# Patient Record
Sex: MALE | Race: WHITE | Employment: OTHER | URBAN - METROPOLITAN AREA
[De-identification: names, ages, dates, MRNs, and addresses within clinical notes are randomized per-mention and may not be internally consistent; named-entity substitution may affect disease eponyms.]

---

## 2019-01-01 ENCOUNTER — HOSPITAL ENCOUNTER (OUTPATIENT)
Dept: CARDIAC REHAB | Facility: CLINIC | Age: 59
End: 2019-10-23
Attending: PHYSICIAN ASSISTANT
Payer: COMMERCIAL

## 2019-01-01 ENCOUNTER — OFFICE VISIT (OUTPATIENT)
Dept: NEUROLOGY | Facility: CLINIC | Age: 59
End: 2019-01-01
Payer: COMMERCIAL

## 2019-01-01 ENCOUNTER — CARE COORDINATION (OUTPATIENT)
Dept: CARDIOLOGY | Facility: CLINIC | Age: 59
End: 2019-01-01

## 2019-01-01 ENCOUNTER — HOSPITAL ENCOUNTER (OUTPATIENT)
Dept: CARDIAC REHAB | Facility: CLINIC | Age: 59
End: 2019-10-04
Attending: PHYSICIAN ASSISTANT
Payer: COMMERCIAL

## 2019-01-01 ENCOUNTER — HOSPITAL ENCOUNTER (INPATIENT)
Facility: CLINIC | Age: 59
LOS: 5 days | Discharge: HOME HEALTH | DRG: 854 | End: 2019-07-22
Attending: EMERGENCY MEDICINE | Admitting: INTERNAL MEDICINE
Payer: COMMERCIAL

## 2019-01-01 ENCOUNTER — OFFICE VISIT (OUTPATIENT)
Dept: FAMILY MEDICINE | Facility: CLINIC | Age: 59
End: 2019-01-01
Payer: COMMERCIAL

## 2019-01-01 ENCOUNTER — OFFICE VISIT (OUTPATIENT)
Dept: CARDIOLOGY | Facility: CLINIC | Age: 59
End: 2019-01-01
Attending: PHYSICIAN ASSISTANT
Payer: COMMERCIAL

## 2019-01-01 ENCOUNTER — TELEPHONE (OUTPATIENT)
Dept: FAMILY MEDICINE | Facility: CLINIC | Age: 59
End: 2019-01-01

## 2019-01-01 ENCOUNTER — HOSPITAL ENCOUNTER (OUTPATIENT)
Dept: PHYSICAL THERAPY | Facility: CLINIC | Age: 59
Setting detail: THERAPIES SERIES
End: 2019-10-31
Attending: PSYCHIATRY & NEUROLOGY
Payer: COMMERCIAL

## 2019-01-01 ENCOUNTER — OFFICE VISIT (OUTPATIENT)
Dept: URGENT CARE | Facility: URGENT CARE | Age: 59
End: 2019-01-01
Payer: COMMERCIAL

## 2019-01-01 ENCOUNTER — APPOINTMENT (OUTPATIENT)
Dept: OCCUPATIONAL THERAPY | Facility: CLINIC | Age: 59
DRG: 292 | End: 2019-01-01
Payer: COMMERCIAL

## 2019-01-01 ENCOUNTER — HOSPITAL ENCOUNTER (OUTPATIENT)
Dept: PHYSICAL THERAPY | Facility: CLINIC | Age: 59
Setting detail: THERAPIES SERIES
End: 2019-10-11
Attending: PSYCHIATRY & NEUROLOGY
Payer: COMMERCIAL

## 2019-01-01 ENCOUNTER — PATIENT OUTREACH (OUTPATIENT)
Dept: CARE COORDINATION | Facility: CLINIC | Age: 59
End: 2019-01-01

## 2019-01-01 ENCOUNTER — DOCUMENTATION ONLY (OUTPATIENT)
Dept: NEUROLOGY | Facility: CLINIC | Age: 59
End: 2019-01-01

## 2019-01-01 ENCOUNTER — HOSPITAL ENCOUNTER (OUTPATIENT)
Dept: PHYSICAL THERAPY | Facility: CLINIC | Age: 59
Setting detail: THERAPIES SERIES
End: 2019-11-14
Attending: PSYCHIATRY & NEUROLOGY
Payer: COMMERCIAL

## 2019-01-01 ENCOUNTER — OFFICE VISIT (OUTPATIENT)
Dept: NEUROLOGY | Facility: CLINIC | Age: 59
End: 2019-01-01
Attending: PSYCHIATRY & NEUROLOGY
Payer: COMMERCIAL

## 2019-01-01 ENCOUNTER — HOSPITAL ENCOUNTER (OUTPATIENT)
Dept: CARDIOLOGY | Facility: CLINIC | Age: 59
Discharge: HOME | End: 2019-09-09
Attending: PHYSICIAN ASSISTANT | Admitting: PHYSICIAN ASSISTANT
Payer: COMMERCIAL

## 2019-01-01 ENCOUNTER — NURSE TRIAGE (OUTPATIENT)
Dept: NURSING | Facility: CLINIC | Age: 59
End: 2019-01-01

## 2019-01-01 ENCOUNTER — HOSPITAL ENCOUNTER (OUTPATIENT)
Dept: CARDIAC REHAB | Facility: CLINIC | Age: 59
End: 2019-10-18
Attending: PHYSICIAN ASSISTANT
Payer: COMMERCIAL

## 2019-01-01 ENCOUNTER — MYC MEDICAL ADVICE (OUTPATIENT)
Dept: FAMILY MEDICINE | Facility: CLINIC | Age: 59
End: 2019-01-01

## 2019-01-01 ENCOUNTER — TRANSFERRED RECORDS (OUTPATIENT)
Dept: HEALTH INFORMATION MANAGEMENT | Facility: CLINIC | Age: 59
End: 2019-01-01

## 2019-01-01 ENCOUNTER — APPOINTMENT (OUTPATIENT)
Dept: ULTRASOUND IMAGING | Facility: CLINIC | Age: 59
DRG: 854 | End: 2019-01-01
Attending: INTERNAL MEDICINE
Payer: COMMERCIAL

## 2019-01-01 ENCOUNTER — HOSPITAL ENCOUNTER (OUTPATIENT)
Dept: CARDIAC REHAB | Facility: CLINIC | Age: 59
End: 2019-09-30
Attending: PHYSICIAN ASSISTANT
Payer: COMMERCIAL

## 2019-01-01 ENCOUNTER — HOSPITAL ENCOUNTER (OUTPATIENT)
Dept: PHYSICAL THERAPY | Facility: CLINIC | Age: 59
Setting detail: THERAPIES SERIES
End: 2019-12-12
Attending: PSYCHIATRY & NEUROLOGY
Payer: COMMERCIAL

## 2019-01-01 ENCOUNTER — APPOINTMENT (OUTPATIENT)
Dept: NUCLEAR MEDICINE | Facility: CLINIC | Age: 59
DRG: 292 | End: 2019-01-01
Attending: INTERNAL MEDICINE
Payer: COMMERCIAL

## 2019-01-01 ENCOUNTER — HOSPITAL ENCOUNTER (OUTPATIENT)
Dept: CARDIAC REHAB | Facility: CLINIC | Age: 59
End: 2019-11-01
Attending: PHYSICIAN ASSISTANT
Payer: COMMERCIAL

## 2019-01-01 ENCOUNTER — HOSPITAL ENCOUNTER (OUTPATIENT)
Dept: CARDIAC REHAB | Facility: CLINIC | Age: 59
End: 2019-11-13
Attending: PHYSICIAN ASSISTANT
Payer: COMMERCIAL

## 2019-01-01 ENCOUNTER — APPOINTMENT (OUTPATIENT)
Dept: OCCUPATIONAL THERAPY | Facility: CLINIC | Age: 59
DRG: 292 | End: 2019-01-01
Attending: HOSPITALIST
Payer: COMMERCIAL

## 2019-01-01 ENCOUNTER — HOSPITAL ENCOUNTER (OUTPATIENT)
Dept: PHYSICAL THERAPY | Facility: CLINIC | Age: 59
Setting detail: THERAPIES SERIES
End: 2019-11-21
Attending: PSYCHIATRY & NEUROLOGY
Payer: COMMERCIAL

## 2019-01-01 ENCOUNTER — ANCILLARY PROCEDURE (OUTPATIENT)
Dept: GENERAL RADIOLOGY | Facility: CLINIC | Age: 59
End: 2019-01-01
Attending: PHYSICIAN ASSISTANT
Payer: COMMERCIAL

## 2019-01-01 ENCOUNTER — HOSPITAL ENCOUNTER (OUTPATIENT)
Dept: CARDIAC REHAB | Facility: CLINIC | Age: 59
End: 2019-10-07
Attending: PHYSICIAN ASSISTANT
Payer: COMMERCIAL

## 2019-01-01 ENCOUNTER — HOSPITAL ENCOUNTER (OUTPATIENT)
Dept: CARDIAC REHAB | Facility: CLINIC | Age: 59
End: 2019-10-09
Attending: PHYSICIAN ASSISTANT
Payer: COMMERCIAL

## 2019-01-01 ENCOUNTER — HOSPITAL ENCOUNTER (OUTPATIENT)
Dept: PHYSICAL THERAPY | Facility: CLINIC | Age: 59
Setting detail: THERAPIES SERIES
End: 2019-11-07
Attending: PSYCHIATRY & NEUROLOGY
Payer: COMMERCIAL

## 2019-01-01 ENCOUNTER — OFFICE VISIT (OUTPATIENT)
Dept: SURGERY | Facility: CLINIC | Age: 59
End: 2019-01-01
Payer: COMMERCIAL

## 2019-01-01 ENCOUNTER — HOSPITAL ENCOUNTER (OUTPATIENT)
Dept: PHYSICAL THERAPY | Facility: CLINIC | Age: 59
Setting detail: THERAPIES SERIES
End: 2019-12-26
Attending: PSYCHIATRY & NEUROLOGY
Payer: COMMERCIAL

## 2019-01-01 ENCOUNTER — CARE COORDINATION (OUTPATIENT)
Dept: LAB | Facility: CLINIC | Age: 59
End: 2019-01-01

## 2019-01-01 ENCOUNTER — HOSPITAL ENCOUNTER (OUTPATIENT)
Dept: CARDIAC REHAB | Facility: CLINIC | Age: 59
End: 2019-10-21
Attending: PHYSICIAN ASSISTANT
Payer: COMMERCIAL

## 2019-01-01 ENCOUNTER — HOSPITAL ENCOUNTER (OUTPATIENT)
Dept: CARDIAC REHAB | Facility: CLINIC | Age: 59
End: 2019-10-28
Attending: PHYSICIAN ASSISTANT
Payer: COMMERCIAL

## 2019-01-01 ENCOUNTER — TELEPHONE (OUTPATIENT)
Dept: CARDIOLOGY | Facility: CLINIC | Age: 59
End: 2019-01-01

## 2019-01-01 ENCOUNTER — OFFICE VISIT (OUTPATIENT)
Dept: PODIATRY | Facility: CLINIC | Age: 59
End: 2019-01-01
Attending: PHYSICIAN ASSISTANT
Payer: COMMERCIAL

## 2019-01-01 ENCOUNTER — APPOINTMENT (OUTPATIENT)
Dept: CARDIOLOGY | Facility: CLINIC | Age: 59
DRG: 292 | End: 2019-01-01
Attending: HOSPITALIST
Payer: COMMERCIAL

## 2019-01-01 ENCOUNTER — HEALTH MAINTENANCE LETTER (OUTPATIENT)
Age: 59
End: 2019-01-01

## 2019-01-01 ENCOUNTER — HOSPITAL ENCOUNTER (OUTPATIENT)
Dept: CARDIAC REHAB | Facility: CLINIC | Age: 59
End: 2019-10-02
Attending: PHYSICIAN ASSISTANT
Payer: COMMERCIAL

## 2019-01-01 ENCOUNTER — HOSPITAL ENCOUNTER (OUTPATIENT)
Dept: CARDIAC REHAB | Facility: CLINIC | Age: 59
End: 2019-11-19
Attending: PHYSICIAN ASSISTANT
Payer: COMMERCIAL

## 2019-01-01 ENCOUNTER — APPOINTMENT (OUTPATIENT)
Dept: GENERAL RADIOLOGY | Facility: CLINIC | Age: 59
DRG: 292 | End: 2019-01-01
Attending: PHYSICIAN ASSISTANT
Payer: COMMERCIAL

## 2019-01-01 ENCOUNTER — HOSPITAL ENCOUNTER (OUTPATIENT)
Dept: CARDIAC REHAB | Facility: CLINIC | Age: 59
End: 2019-11-11
Attending: PHYSICIAN ASSISTANT
Payer: COMMERCIAL

## 2019-01-01 ENCOUNTER — HOSPITAL ENCOUNTER (OUTPATIENT)
Dept: CARDIAC REHAB | Facility: CLINIC | Age: 59
End: 2019-10-30
Attending: PHYSICIAN ASSISTANT
Payer: COMMERCIAL

## 2019-01-01 ENCOUNTER — HOSPITAL ENCOUNTER (INPATIENT)
Facility: CLINIC | Age: 59
LOS: 5 days | Discharge: HOME | DRG: 292 | End: 2019-08-24
Attending: EMERGENCY MEDICINE | Admitting: HOSPITALIST
Payer: COMMERCIAL

## 2019-01-01 ENCOUNTER — HOSPITAL ENCOUNTER (OUTPATIENT)
Dept: CARDIAC REHAB | Facility: CLINIC | Age: 59
End: 2019-10-16
Attending: PHYSICIAN ASSISTANT
Payer: COMMERCIAL

## 2019-01-01 ENCOUNTER — HOSPITAL ENCOUNTER (OUTPATIENT)
Dept: CARDIAC REHAB | Facility: CLINIC | Age: 59
End: 2019-11-15
Attending: PHYSICIAN ASSISTANT
Payer: COMMERCIAL

## 2019-01-01 ENCOUNTER — APPOINTMENT (OUTPATIENT)
Dept: CT IMAGING | Facility: CLINIC | Age: 59
DRG: 292 | End: 2019-01-01
Attending: EMERGENCY MEDICINE
Payer: COMMERCIAL

## 2019-01-01 ENCOUNTER — HOSPITAL ENCOUNTER (OUTPATIENT)
Dept: CARDIAC REHAB | Facility: CLINIC | Age: 59
End: 2019-10-25
Attending: PHYSICIAN ASSISTANT
Payer: COMMERCIAL

## 2019-01-01 ENCOUNTER — HOSPITAL ENCOUNTER (OUTPATIENT)
Dept: PHYSICAL THERAPY | Facility: CLINIC | Age: 59
Setting detail: THERAPIES SERIES
End: 2019-12-19
Attending: PSYCHIATRY & NEUROLOGY
Payer: COMMERCIAL

## 2019-01-01 ENCOUNTER — HOSPITAL ENCOUNTER (OUTPATIENT)
Dept: CARDIAC REHAB | Facility: CLINIC | Age: 59
End: 2019-09-26
Attending: PHYSICIAN ASSISTANT
Payer: COMMERCIAL

## 2019-01-01 ENCOUNTER — HOSPITAL ENCOUNTER (OUTPATIENT)
Dept: PHYSICAL THERAPY | Facility: CLINIC | Age: 59
Setting detail: THERAPIES SERIES
End: 2019-12-05
Attending: PSYCHIATRY & NEUROLOGY
Payer: COMMERCIAL

## 2019-01-01 ENCOUNTER — HOSPITAL ENCOUNTER (OUTPATIENT)
Dept: CARDIAC REHAB | Facility: CLINIC | Age: 59
End: 2019-11-08
Attending: PHYSICIAN ASSISTANT
Payer: COMMERCIAL

## 2019-01-01 ENCOUNTER — HOSPITAL ENCOUNTER (OUTPATIENT)
Dept: CARDIAC REHAB | Facility: CLINIC | Age: 59
End: 2019-10-11
Attending: PHYSICIAN ASSISTANT
Payer: COMMERCIAL

## 2019-01-01 VITALS
DIASTOLIC BLOOD PRESSURE: 78 MMHG | HEIGHT: 71 IN | SYSTOLIC BLOOD PRESSURE: 126 MMHG | HEART RATE: 85 BPM | OXYGEN SATURATION: 98 % | BODY MASS INDEX: 28.14 KG/M2 | WEIGHT: 201 LBS | TEMPERATURE: 98.6 F

## 2019-01-01 VITALS
RESPIRATION RATE: 16 BRPM | HEART RATE: 84 BPM | OXYGEN SATURATION: 98 % | BODY MASS INDEX: 27.62 KG/M2 | WEIGHT: 197.3 LBS | HEIGHT: 71 IN | SYSTOLIC BLOOD PRESSURE: 136 MMHG | DIASTOLIC BLOOD PRESSURE: 90 MMHG | TEMPERATURE: 97.8 F

## 2019-01-01 VITALS
DIASTOLIC BLOOD PRESSURE: 72 MMHG | HEART RATE: 72 BPM | HEIGHT: 71 IN | SYSTOLIC BLOOD PRESSURE: 112 MMHG | OXYGEN SATURATION: 96 % | BODY MASS INDEX: 27.41 KG/M2 | WEIGHT: 195.8 LBS

## 2019-01-01 VITALS
HEART RATE: 125 BPM | TEMPERATURE: 100.2 F | SYSTOLIC BLOOD PRESSURE: 176 MMHG | DIASTOLIC BLOOD PRESSURE: 112 MMHG | OXYGEN SATURATION: 96 %

## 2019-01-01 VITALS
BODY MASS INDEX: 28.42 KG/M2 | HEIGHT: 71 IN | WEIGHT: 203 LBS | DIASTOLIC BLOOD PRESSURE: 108 MMHG | SYSTOLIC BLOOD PRESSURE: 150 MMHG

## 2019-01-01 VITALS
TEMPERATURE: 97.9 F | WEIGHT: 197.8 LBS | DIASTOLIC BLOOD PRESSURE: 86 MMHG | HEART RATE: 80 BPM | SYSTOLIC BLOOD PRESSURE: 134 MMHG | BODY MASS INDEX: 27.69 KG/M2 | HEIGHT: 71 IN

## 2019-01-01 VITALS
BODY MASS INDEX: 28.22 KG/M2 | HEIGHT: 71 IN | DIASTOLIC BLOOD PRESSURE: 102 MMHG | HEART RATE: 82 BPM | SYSTOLIC BLOOD PRESSURE: 160 MMHG | WEIGHT: 201.6 LBS | TEMPERATURE: 98.4 F

## 2019-01-01 VITALS
HEART RATE: 79 BPM | DIASTOLIC BLOOD PRESSURE: 80 MMHG | BODY MASS INDEX: 28.1 KG/M2 | OXYGEN SATURATION: 98 % | TEMPERATURE: 98.1 F | WEIGHT: 201.5 LBS | SYSTOLIC BLOOD PRESSURE: 122 MMHG

## 2019-01-01 VITALS
HEART RATE: 76 BPM | BODY MASS INDEX: 27.44 KG/M2 | OXYGEN SATURATION: 96 % | HEIGHT: 71 IN | DIASTOLIC BLOOD PRESSURE: 82 MMHG | WEIGHT: 196 LBS | SYSTOLIC BLOOD PRESSURE: 138 MMHG

## 2019-01-01 VITALS
WEIGHT: 206.6 LBS | SYSTOLIC BLOOD PRESSURE: 128 MMHG | HEIGHT: 71 IN | DIASTOLIC BLOOD PRESSURE: 82 MMHG | BODY MASS INDEX: 28.92 KG/M2 | HEART RATE: 81 BPM

## 2019-01-01 VITALS
HEART RATE: 88 BPM | BODY MASS INDEX: 28.87 KG/M2 | TEMPERATURE: 98 F | SYSTOLIC BLOOD PRESSURE: 150 MMHG | DIASTOLIC BLOOD PRESSURE: 94 MMHG | HEIGHT: 71 IN | WEIGHT: 206.2 LBS

## 2019-01-01 VITALS
BODY MASS INDEX: 28.22 KG/M2 | WEIGHT: 201.6 LBS | TEMPERATURE: 99 F | HEIGHT: 71 IN | SYSTOLIC BLOOD PRESSURE: 154 MMHG | HEART RATE: 100 BPM | DIASTOLIC BLOOD PRESSURE: 90 MMHG

## 2019-01-01 VITALS
DIASTOLIC BLOOD PRESSURE: 108 MMHG | OXYGEN SATURATION: 94 % | TEMPERATURE: 99.4 F | HEART RATE: 104 BPM | HEIGHT: 71 IN | WEIGHT: 205 LBS | BODY MASS INDEX: 28.7 KG/M2 | SYSTOLIC BLOOD PRESSURE: 162 MMHG

## 2019-01-01 VITALS — SYSTOLIC BLOOD PRESSURE: 128 MMHG | DIASTOLIC BLOOD PRESSURE: 77 MMHG | OXYGEN SATURATION: 95 %

## 2019-01-01 VITALS
RESPIRATION RATE: 16 BRPM | HEART RATE: 90 BPM | DIASTOLIC BLOOD PRESSURE: 84 MMHG | BODY MASS INDEX: 28.39 KG/M2 | SYSTOLIC BLOOD PRESSURE: 143 MMHG | TEMPERATURE: 97.2 F | HEIGHT: 71 IN | WEIGHT: 202.8 LBS | OXYGEN SATURATION: 96 %

## 2019-01-01 VITALS
HEIGHT: 71 IN | WEIGHT: 189.8 LBS | HEART RATE: 90 BPM | TEMPERATURE: 97.8 F | SYSTOLIC BLOOD PRESSURE: 180 MMHG | DIASTOLIC BLOOD PRESSURE: 104 MMHG | BODY MASS INDEX: 26.57 KG/M2

## 2019-01-01 VITALS
HEART RATE: 87 BPM | OXYGEN SATURATION: 98 % | BODY MASS INDEX: 28.31 KG/M2 | WEIGHT: 203 LBS | DIASTOLIC BLOOD PRESSURE: 78 MMHG | TEMPERATURE: 98.3 F | SYSTOLIC BLOOD PRESSURE: 131 MMHG

## 2019-01-01 DIAGNOSIS — I10 HYPERTENSION GOAL BP (BLOOD PRESSURE) < 140/90: ICD-10-CM

## 2019-01-01 DIAGNOSIS — I10 ESSENTIAL HYPERTENSION: ICD-10-CM

## 2019-01-01 DIAGNOSIS — L02.212 BACK ABSCESS: ICD-10-CM

## 2019-01-01 DIAGNOSIS — E11.9 TYPE 2 DIABETES, HBA1C GOAL < 7% (H): Chronic | ICD-10-CM

## 2019-01-01 DIAGNOSIS — M62.59 ATROPHY OF MUSCLE OF MULTIPLE SITES: ICD-10-CM

## 2019-01-01 DIAGNOSIS — I50.33 ACUTE ON CHRONIC DIASTOLIC CONGESTIVE HEART FAILURE (H): Primary | ICD-10-CM

## 2019-01-01 DIAGNOSIS — E11.9 TYPE 2 DIABETES MELLITUS WITHOUT COMPLICATION, WITH LONG-TERM CURRENT USE OF INSULIN (H): ICD-10-CM

## 2019-01-01 DIAGNOSIS — I10 HYPERTENSION, UNSPECIFIED TYPE: ICD-10-CM

## 2019-01-01 DIAGNOSIS — G63 POLYNEUROPATHY ASSOCIATED WITH UNDERLYING DISEASE (H): ICD-10-CM

## 2019-01-01 DIAGNOSIS — Z79.4 TYPE 2 DIABETES MELLITUS WITHOUT COMPLICATION, WITH LONG-TERM CURRENT USE OF INSULIN (H): ICD-10-CM

## 2019-01-01 DIAGNOSIS — L03.319 CELLULITIS AND ABSCESS OF TRUNK: Primary | ICD-10-CM

## 2019-01-01 DIAGNOSIS — I24.89 DEMAND ISCHEMIA (H): ICD-10-CM

## 2019-01-01 DIAGNOSIS — L03.90 CELLULITIS, UNSPECIFIED CELLULITIS SITE: ICD-10-CM

## 2019-01-01 DIAGNOSIS — I50.43 ACUTE ON CHRONIC COMBINED SYSTOLIC AND DIASTOLIC CONGESTIVE HEART FAILURE (H): ICD-10-CM

## 2019-01-01 DIAGNOSIS — I50.9 CONGESTIVE HEART FAILURE, UNSPECIFIED HF CHRONICITY, UNSPECIFIED HEART FAILURE TYPE (H): ICD-10-CM

## 2019-01-01 DIAGNOSIS — E11.59 TYPE 2 DIABETES MELLITUS WITH OTHER CIRCULATORY COMPLICATION, UNSPECIFIED WHETHER LONG TERM INSULIN USE (H): Primary | ICD-10-CM

## 2019-01-01 DIAGNOSIS — R73.9 HYPERGLYCEMIA: ICD-10-CM

## 2019-01-01 DIAGNOSIS — L02.219 CELLULITIS AND ABSCESS OF TRUNK: Primary | ICD-10-CM

## 2019-01-01 DIAGNOSIS — R65.10 SIRS (SYSTEMIC INFLAMMATORY RESPONSE SYNDROME) (H): ICD-10-CM

## 2019-01-01 DIAGNOSIS — G56.23 ULNAR NEUROPATHY OF BOTH UPPER EXTREMITIES: ICD-10-CM

## 2019-01-01 DIAGNOSIS — R53.83 OTHER FATIGUE: ICD-10-CM

## 2019-01-01 DIAGNOSIS — F41.9 ANXIETY DUE TO INVASIVE PROCEDURE: ICD-10-CM

## 2019-01-01 DIAGNOSIS — I10 HTN (HYPERTENSION): Primary | ICD-10-CM

## 2019-01-01 DIAGNOSIS — G62.9 NEUROPATHY: ICD-10-CM

## 2019-01-01 DIAGNOSIS — R79.89 ELEVATED TROPONIN: ICD-10-CM

## 2019-01-01 DIAGNOSIS — M21.371 BILATERAL FOOT-DROP: ICD-10-CM

## 2019-01-01 DIAGNOSIS — N28.9 DECREASED RENAL FUNCTION: ICD-10-CM

## 2019-01-01 DIAGNOSIS — I50.9 ACUTE ON CHRONIC CONGESTIVE HEART FAILURE, UNSPECIFIED HEART FAILURE TYPE (H): ICD-10-CM

## 2019-01-01 DIAGNOSIS — R06.02 SHORTNESS OF BREATH: ICD-10-CM

## 2019-01-01 DIAGNOSIS — L02.212 BACK ABSCESS: Primary | ICD-10-CM

## 2019-01-01 DIAGNOSIS — E78.5 HYPERLIPIDEMIA WITH TARGET LDL LESS THAN 100: Chronic | ICD-10-CM

## 2019-01-01 DIAGNOSIS — E11.59 TYPE 2 DIABETES MELLITUS WITH OTHER CIRCULATORY COMPLICATION, UNSPECIFIED WHETHER LONG TERM INSULIN USE (H): Primary | Chronic | ICD-10-CM

## 2019-01-01 DIAGNOSIS — Z13.89 SCREENING FOR DIABETIC PERIPHERAL NEUROPATHY: ICD-10-CM

## 2019-01-01 DIAGNOSIS — M54.12 CERVICAL RADICULOPATHY: ICD-10-CM

## 2019-01-01 DIAGNOSIS — E11.59 TYPE 2 DIABETES MELLITUS WITH OTHER CIRCULATORY COMPLICATION, UNSPECIFIED WHETHER LONG TERM INSULIN USE (H): ICD-10-CM

## 2019-01-01 DIAGNOSIS — I50.9 CONGESTIVE HEART FAILURE, UNSPECIFIED HF CHRONICITY, UNSPECIFIED HEART FAILURE TYPE (H): Primary | ICD-10-CM

## 2019-01-01 DIAGNOSIS — E11.42 DIABETIC POLYNEUROPATHY ASSOCIATED WITH TYPE 2 DIABETES MELLITUS (H): Primary | ICD-10-CM

## 2019-01-01 DIAGNOSIS — I10 HYPERTENSION GOAL BP (BLOOD PRESSURE) < 140/90: Primary | ICD-10-CM

## 2019-01-01 DIAGNOSIS — Z12.11 SCREEN FOR COLON CANCER: ICD-10-CM

## 2019-01-01 DIAGNOSIS — M54.16 LUMBAR RADICULOPATHY: ICD-10-CM

## 2019-01-01 DIAGNOSIS — Z09 SURGERY FOLLOW-UP EXAMINATION: Primary | ICD-10-CM

## 2019-01-01 DIAGNOSIS — I50.31 ACUTE HEART FAILURE WITH PRESERVED EJECTION FRACTION (H): ICD-10-CM

## 2019-01-01 DIAGNOSIS — Z79.4 TYPE 2 DIABETES MELLITUS WITH COMPLICATION, WITH LONG-TERM CURRENT USE OF INSULIN (H): ICD-10-CM

## 2019-01-01 DIAGNOSIS — Z11.59 NEED FOR HEPATITIS C SCREENING TEST: ICD-10-CM

## 2019-01-01 DIAGNOSIS — R25.3 FASCICULATIONS OF MUSCLE: ICD-10-CM

## 2019-01-01 DIAGNOSIS — Z11.4 SCREENING FOR HIV (HUMAN IMMUNODEFICIENCY VIRUS): ICD-10-CM

## 2019-01-01 DIAGNOSIS — R05.9 COUGH: ICD-10-CM

## 2019-01-01 DIAGNOSIS — R53.1 WEAK: ICD-10-CM

## 2019-01-01 DIAGNOSIS — N17.9 ACUTE KIDNEY INJURY (H): ICD-10-CM

## 2019-01-01 DIAGNOSIS — I50.31 ACUTE HEART FAILURE WITH PRESERVED EJECTION FRACTION (H): Primary | ICD-10-CM

## 2019-01-01 DIAGNOSIS — M25.571 PAIN IN JOINT, ANKLE AND FOOT, RIGHT: ICD-10-CM

## 2019-01-01 DIAGNOSIS — M21.372 BILATERAL FOOT-DROP: ICD-10-CM

## 2019-01-01 DIAGNOSIS — E11.42 DIABETIC POLYNEUROPATHY ASSOCIATED WITH TYPE 2 DIABETES MELLITUS (H): ICD-10-CM

## 2019-01-01 DIAGNOSIS — K08.89 PAIN, DENTAL: ICD-10-CM

## 2019-01-01 DIAGNOSIS — G56.03 BILATERAL CARPAL TUNNEL SYNDROME: ICD-10-CM

## 2019-01-01 DIAGNOSIS — E11.8 TYPE 2 DIABETES MELLITUS WITH COMPLICATION, WITH LONG-TERM CURRENT USE OF INSULIN (H): ICD-10-CM

## 2019-01-01 DIAGNOSIS — E11.9 TYPE 2 DIABETES MELLITUS WITHOUT COMPLICATION, UNSPECIFIED WHETHER LONG TERM INSULIN USE (H): Primary | ICD-10-CM

## 2019-01-01 DIAGNOSIS — M21.371 BILATERAL FOOT-DROP: Primary | ICD-10-CM

## 2019-01-01 DIAGNOSIS — L02.219 CELLULITIS AND ABSCESS OF TRUNK: ICD-10-CM

## 2019-01-01 DIAGNOSIS — R29.818 DIFFICULTY BALANCING: ICD-10-CM

## 2019-01-01 DIAGNOSIS — M25.471 EDEMA OF RIGHT ANKLE: Primary | ICD-10-CM

## 2019-01-01 DIAGNOSIS — R06.02 SHORTNESS OF BREATH: Primary | ICD-10-CM

## 2019-01-01 DIAGNOSIS — I50.43 ACUTE ON CHRONIC COMBINED SYSTOLIC AND DIASTOLIC CONGESTIVE HEART FAILURE (H): Primary | ICD-10-CM

## 2019-01-01 DIAGNOSIS — M21.372 BILATERAL FOOT-DROP: Primary | ICD-10-CM

## 2019-01-01 DIAGNOSIS — I50.9 CHF EXACERBATION (H): Primary | ICD-10-CM

## 2019-01-01 DIAGNOSIS — G56.03 BILATERAL CARPAL TUNNEL SYNDROME: Primary | ICD-10-CM

## 2019-01-01 DIAGNOSIS — L03.319 CELLULITIS AND ABSCESS OF TRUNK: ICD-10-CM

## 2019-01-01 DIAGNOSIS — Z13.5 SCREENING FOR DIABETIC RETINOPATHY: ICD-10-CM

## 2019-01-01 DIAGNOSIS — E11.9 TYPE 2 DIABETES MELLITUS WITHOUT COMPLICATION, UNSPECIFIED WHETHER LONG TERM INSULIN USE (H): Primary | Chronic | ICD-10-CM

## 2019-01-01 DIAGNOSIS — E11.9 TYPE 2 DIABETES MELLITUS WITHOUT COMPLICATION, UNSPECIFIED WHETHER LONG TERM INSULIN USE (H): Chronic | ICD-10-CM

## 2019-01-01 LAB
A-TOCOPHEROL VIT E SERPL-MCNC: 14.1 MG/L (ref 5.5–18)
ALBUMIN MFR UR ELPH: 71.6 %
ALBUMIN SERPL ELPH-MCNC: 3.8 G/DL (ref 3.7–5.1)
ALBUMIN SERPL-MCNC: 1.9 G/DL (ref 3.4–5)
ALBUMIN SERPL-MCNC: 2 G/DL (ref 3.4–5)
ALBUMIN SERPL-MCNC: 2.1 G/DL (ref 3.4–5)
ALBUMIN SERPL-MCNC: 2.8 G/DL (ref 3.4–5)
ALBUMIN UR-MCNC: 10 MG/DL
ALP SERPL-CCNC: 109 U/L (ref 40–150)
ALP SERPL-CCNC: 67 U/L (ref 40–150)
ALPHA1 GLOB MFR UR ELPH: 8.9 %
ALPHA1 GLOB SERPL ELPH-MCNC: 0.3 G/DL (ref 0.2–0.4)
ALPHA2 GLOB MFR UR ELPH: 7.4 %
ALPHA2 GLOB SERPL ELPH-MCNC: 0.7 G/DL (ref 0.5–0.9)
ALT SERPL W P-5'-P-CCNC: 14 U/L (ref 0–70)
ALT SERPL W P-5'-P-CCNC: 19 U/L (ref 0–70)
AMORPH CRY #/AREA URNS HPF: ABNORMAL /HPF
ANA SER QL IF: NEGATIVE
ANION GAP SERPL CALCULATED.3IONS-SCNC: 10.3 MMOL/L (ref 6–17)
ANION GAP SERPL CALCULATED.3IONS-SCNC: 10.7 MMOL/L (ref 6–17)
ANION GAP SERPL CALCULATED.3IONS-SCNC: 3 MMOL/L (ref 3–14)
ANION GAP SERPL CALCULATED.3IONS-SCNC: 3 MMOL/L (ref 3–14)
ANION GAP SERPL CALCULATED.3IONS-SCNC: 4 MMOL/L (ref 3–14)
ANION GAP SERPL CALCULATED.3IONS-SCNC: 5 MMOL/L (ref 3–14)
ANION GAP SERPL CALCULATED.3IONS-SCNC: 5 MMOL/L (ref 3–14)
ANION GAP SERPL CALCULATED.3IONS-SCNC: 6 MMOL/L (ref 3–14)
ANION GAP SERPL CALCULATED.3IONS-SCNC: 7 MMOL/L (ref 3–14)
ANION GAP SERPL CALCULATED.3IONS-SCNC: 8 MMOL/L (ref 3–14)
ANION GAP SERPL CALCULATED.3IONS-SCNC: 8 MMOL/L (ref 3–14)
ANION GAP SERPL CALCULATED.3IONS-SCNC: 9 MMOL/L (ref 3–14)
APPEARANCE UR: CLEAR
AST SERPL W P-5'-P-CCNC: 10 U/L (ref 0–45)
AST SERPL W P-5'-P-CCNC: 15 U/L (ref 0–45)
B BURGDOR IGG+IGM SER QL: 0.47 (ref 0–0.89)
B-GLOBULIN MFR UR ELPH: 3.5 %
B-GLOBULIN SERPL ELPH-MCNC: 0.9 G/DL (ref 0.6–1)
BACTERIA SPEC CULT: ABNORMAL
BACTERIA SPEC CULT: NO GROWTH
BACTERIA SPEC CULT: NO GROWTH
BASOPHILS # BLD AUTO: 0.1 10E9/L (ref 0–0.2)
BASOPHILS # BLD AUTO: 0.2 10E9/L (ref 0–0.2)
BASOPHILS # BLD AUTO: 0.2 10E9/L (ref 0–0.2)
BASOPHILS NFR BLD AUTO: 0.3 %
BASOPHILS NFR BLD AUTO: 0.5 %
BASOPHILS NFR BLD AUTO: 1.1 %
BASOPHILS NFR BLD AUTO: 1.6 %
BASOPHILS NFR BLD AUTO: 1.8 %
BETA+GAMMA TOCOPHEROL SERPL-MCNC: 1.1 MG/L (ref 0–6)
BILIRUB SERPL-MCNC: 0.5 MG/DL (ref 0.2–1.3)
BILIRUB SERPL-MCNC: 0.5 MG/DL (ref 0.2–1.3)
BILIRUB UR QL STRIP: NEGATIVE
BUN SERPL-MCNC: 20 MG/DL (ref 7–30)
BUN SERPL-MCNC: 21 MG/DL (ref 7–30)
BUN SERPL-MCNC: 21 MG/DL (ref 7–30)
BUN SERPL-MCNC: 26 MG/DL (ref 7–30)
BUN SERPL-MCNC: 27 MG/DL (ref 7–30)
BUN SERPL-MCNC: 28 MG/DL (ref 7–30)
BUN SERPL-MCNC: 28 MG/DL (ref 7–30)
BUN SERPL-MCNC: 29 MG/DL (ref 7–30)
BUN SERPL-MCNC: 30 MG/DL (ref 7–30)
BUN SERPL-MCNC: 31 MG/DL (ref 7–30)
BUN SERPL-MCNC: 35 MG/DL (ref 7–30)
BUN SERPL-MCNC: 37 MG/DL (ref 7–30)
BUN SERPL-MCNC: 39 MG/DL (ref 7–30)
BUN SERPL-MCNC: 44 MG/DL (ref 7–30)
BUN SERPL-MCNC: 45 MG/DL (ref 7–30)
BUN SERPL-MCNC: 48 MG/DL (ref 7–30)
BUN SERPL-MCNC: 49 MG/DL (ref 7–30)
CALCIUM SERPL-MCNC: 8.1 MG/DL (ref 8.5–10.1)
CALCIUM SERPL-MCNC: 8.2 MG/DL (ref 8.5–10.1)
CALCIUM SERPL-MCNC: 8.4 MG/DL (ref 8.5–10.1)
CALCIUM SERPL-MCNC: 8.4 MG/DL (ref 8.5–10.1)
CALCIUM SERPL-MCNC: 8.5 MG/DL (ref 8.5–10.1)
CALCIUM SERPL-MCNC: 8.6 MG/DL (ref 8.5–10.1)
CALCIUM SERPL-MCNC: 8.7 MG/DL (ref 8.5–10.1)
CALCIUM SERPL-MCNC: 8.8 MG/DL (ref 8.5–10.1)
CALCIUM SERPL-MCNC: 9.3 MG/DL (ref 8.5–10.5)
CALCIUM SERPL-MCNC: 9.4 MG/DL (ref 8.5–10.1)
CALCIUM SERPL-MCNC: 9.4 MG/DL (ref 8.5–10.5)
CHLORIDE SERPL-SCNC: 102 MMOL/L (ref 94–109)
CHLORIDE SERPL-SCNC: 102 MMOL/L (ref 98–107)
CHLORIDE SERPL-SCNC: 103 MMOL/L (ref 94–109)
CHLORIDE SERPL-SCNC: 103 MMOL/L (ref 94–109)
CHLORIDE SERPL-SCNC: 104 MMOL/L (ref 94–109)
CHLORIDE SERPL-SCNC: 104 MMOL/L (ref 94–109)
CHLORIDE SERPL-SCNC: 104 MMOL/L (ref 98–107)
CHLORIDE SERPL-SCNC: 107 MMOL/L (ref 94–109)
CHLORIDE SERPL-SCNC: 108 MMOL/L (ref 94–109)
CHLORIDE SERPL-SCNC: 109 MMOL/L (ref 94–109)
CHLORIDE SERPL-SCNC: 109 MMOL/L (ref 94–109)
CHLORIDE SERPL-SCNC: 110 MMOL/L (ref 94–109)
CHLORIDE SERPL-SCNC: 111 MMOL/L (ref 94–109)
CHLORIDE SERPL-SCNC: 99 MMOL/L (ref 94–109)
CHOLEST SERPL-MCNC: 218 MG/DL
CHOLEST SERPL-MCNC: 285 MG/DL
CO2 BLDCOV-SCNC: 25 MMOL/L (ref 21–28)
CO2 BLDCOV-SCNC: 30 MMOL/L (ref 21–28)
CO2 SERPL-SCNC: 25 MMOL/L (ref 20–32)
CO2 SERPL-SCNC: 25 MMOL/L (ref 20–32)
CO2 SERPL-SCNC: 26 MMOL/L (ref 20–32)
CO2 SERPL-SCNC: 27 MMOL/L (ref 20–32)
CO2 SERPL-SCNC: 28 MMOL/L (ref 20–32)
CO2 SERPL-SCNC: 29 MMOL/L (ref 20–32)
CO2 SERPL-SCNC: 30 MMOL/L (ref 20–32)
CO2 SERPL-SCNC: 30 MMOL/L (ref 23–29)
CO2 SERPL-SCNC: 31 MMOL/L (ref 20–32)
CO2 SERPL-SCNC: 31 MMOL/L (ref 20–32)
CO2 SERPL-SCNC: 32 MMOL/L (ref 23–29)
COLOR UR AUTO: YELLOW
CREAT BLD-MCNC: 1.3 MG/DL (ref 0.66–1.25)
CREAT SERPL-MCNC: 0.76 MG/DL (ref 0.66–1.25)
CREAT SERPL-MCNC: 0.99 MG/DL (ref 0.66–1.25)
CREAT SERPL-MCNC: 1.07 MG/DL (ref 0.66–1.25)
CREAT SERPL-MCNC: 1.08 MG/DL (ref 0.66–1.25)
CREAT SERPL-MCNC: 1.08 MG/DL (ref 0.66–1.25)
CREAT SERPL-MCNC: 1.1 MG/DL (ref 0.66–1.25)
CREAT SERPL-MCNC: 1.14 MG/DL (ref 0.66–1.25)
CREAT SERPL-MCNC: 1.16 MG/DL (ref 0.66–1.25)
CREAT SERPL-MCNC: 1.17 MG/DL (ref 0.66–1.25)
CREAT SERPL-MCNC: 1.27 MG/DL (ref 0.66–1.25)
CREAT SERPL-MCNC: 1.32 MG/DL (ref 0.66–1.25)
CREAT SERPL-MCNC: 1.41 MG/DL (ref 0.7–1.3)
CREAT SERPL-MCNC: 1.42 MG/DL (ref 0.7–1.3)
CREAT SERPL-MCNC: 1.5 MG/DL (ref 0.66–1.25)
CREAT SERPL-MCNC: 1.76 MG/DL (ref 0.66–1.25)
CREAT SERPL-MCNC: 1.87 MG/DL (ref 0.66–1.25)
CREAT SERPL-MCNC: 2.17 MG/DL (ref 0.66–1.25)
CREAT SERPL-MCNC: 2.34 MG/DL (ref 0.66–1.25)
CREAT SERPL-MCNC: 2.41 MG/DL (ref 0.66–1.25)
CREAT SERPL-MCNC: 2.54 MG/DL (ref 0.66–1.25)
CREAT UR-MCNC: 48 MG/DL
CREAT UR-MCNC: 87 MG/DL
D DIMER PPP FEU-MCNC: 0.8 UG/ML FEU (ref 0–0.5)
DIFFERENTIAL METHOD BLD: ABNORMAL
DIFFERENTIAL METHOD BLD: NORMAL
DIFFERENTIAL METHOD BLD: NORMAL
ENA SS-A IGG SER IA-ACNC: <0.2 AI (ref 0–0.9)
ENA SS-B IGG SER IA-ACNC: <0.2 AI (ref 0–0.9)
EOSINOPHIL # BLD AUTO: 0.3 10E9/L (ref 0–0.7)
EOSINOPHIL # BLD AUTO: 0.3 10E9/L (ref 0–0.7)
EOSINOPHIL # BLD AUTO: 0.4 10E9/L (ref 0–0.7)
EOSINOPHIL # BLD AUTO: 0.5 10E9/L (ref 0–0.7)
EOSINOPHIL # BLD AUTO: 0.5 10E9/L (ref 0–0.7)
EOSINOPHIL NFR BLD AUTO: 1.2 %
EOSINOPHIL NFR BLD AUTO: 2.8 %
EOSINOPHIL NFR BLD AUTO: 2.9 %
EOSINOPHIL NFR BLD AUTO: 3.5 %
EOSINOPHIL NFR BLD AUTO: 4.8 %
ERYTHROCYTE [DISTWIDTH] IN BLOOD BY AUTOMATED COUNT: 12.7 % (ref 10–15)
ERYTHROCYTE [DISTWIDTH] IN BLOOD BY AUTOMATED COUNT: 12.9 % (ref 10–15)
ERYTHROCYTE [DISTWIDTH] IN BLOOD BY AUTOMATED COUNT: 13 % (ref 10–15)
ERYTHROCYTE [DISTWIDTH] IN BLOOD BY AUTOMATED COUNT: 13.1 % (ref 10–15)
ERYTHROCYTE [DISTWIDTH] IN BLOOD BY AUTOMATED COUNT: 14.4 % (ref 10–15)
ERYTHROCYTE [DISTWIDTH] IN BLOOD BY AUTOMATED COUNT: 14.9 % (ref 10–15)
ERYTHROCYTE [DISTWIDTH] IN BLOOD BY AUTOMATED COUNT: 14.9 % (ref 10–15)
ERYTHROCYTE [DISTWIDTH] IN BLOOD BY AUTOMATED COUNT: 15 % (ref 10–15)
ERYTHROCYTE [DISTWIDTH] IN BLOOD BY AUTOMATED COUNT: 15.1 % (ref 10–15)
ERYTHROCYTE [DISTWIDTH] IN BLOOD BY AUTOMATED COUNT: 15.6 % (ref 10–15)
ERYTHROCYTE [SEDIMENTATION RATE] IN BLOOD BY WESTERGREN METHOD: 11 MM/H (ref 0–20)
ERYTHROCYTE [SEDIMENTATION RATE] IN BLOOD BY WESTERGREN METHOD: 18 MM/H (ref 0–20)
ERYTHROCYTE [SEDIMENTATION RATE] IN BLOOD BY WESTERGREN METHOD: 51 MM/H (ref 0–20)
FRACT EXCRET NA UR+SERPL-RTO: 0.5 %
GAMMA GLOB MFR UR ELPH: 8.6 %
GAMMA GLOB SERPL ELPH-MCNC: 1 G/DL (ref 0.7–1.6)
GFR SERPL CREATININE-BSD FRML MDRD: 27 ML/MIN/{1.73_M2}
GFR SERPL CREATININE-BSD FRML MDRD: 28 ML/MIN/{1.73_M2}
GFR SERPL CREATININE-BSD FRML MDRD: 29 ML/MIN/{1.73_M2}
GFR SERPL CREATININE-BSD FRML MDRD: 32 ML/MIN/{1.73_M2}
GFR SERPL CREATININE-BSD FRML MDRD: 39 ML/MIN/{1.73_M2}
GFR SERPL CREATININE-BSD FRML MDRD: 42 ML/MIN/{1.73_M2}
GFR SERPL CREATININE-BSD FRML MDRD: 50 ML/MIN/{1.73_M2}
GFR SERPL CREATININE-BSD FRML MDRD: 51 ML/MIN/{1.73_M2}
GFR SERPL CREATININE-BSD FRML MDRD: 52 ML/MIN/{1.73_M2}
GFR SERPL CREATININE-BSD FRML MDRD: 57 ML/MIN/{1.73_M2}
GFR SERPL CREATININE-BSD FRML MDRD: 59 ML/MIN/{1.73_M2}
GFR SERPL CREATININE-BSD FRML MDRD: 62 ML/MIN/{1.73_M2}
GFR SERPL CREATININE-BSD FRML MDRD: 68 ML/MIN/{1.73_M2}
GFR SERPL CREATININE-BSD FRML MDRD: 69 ML/MIN/{1.73_M2}
GFR SERPL CREATININE-BSD FRML MDRD: 70 ML/MIN/{1.73_M2}
GFR SERPL CREATININE-BSD FRML MDRD: 73 ML/MIN/{1.73_M2}
GFR SERPL CREATININE-BSD FRML MDRD: 75 ML/MIN/{1.73_M2}
GFR SERPL CREATININE-BSD FRML MDRD: 75 ML/MIN/{1.73_M2}
GFR SERPL CREATININE-BSD FRML MDRD: 76 ML/MIN/{1.73_M2}
GFR SERPL CREATININE-BSD FRML MDRD: 83 ML/MIN/{1.73_M2}
GFR SERPL CREATININE-BSD FRML MDRD: >90 ML/MIN/{1.73_M2}
GLUCOSE BLDC GLUCOMTR-MCNC: 100 MG/DL (ref 70–99)
GLUCOSE BLDC GLUCOMTR-MCNC: 106 MG/DL (ref 70–99)
GLUCOSE BLDC GLUCOMTR-MCNC: 115 MG/DL (ref 70–99)
GLUCOSE BLDC GLUCOMTR-MCNC: 118 MG/DL (ref 70–99)
GLUCOSE BLDC GLUCOMTR-MCNC: 118 MG/DL (ref 70–99)
GLUCOSE BLDC GLUCOMTR-MCNC: 119 MG/DL (ref 70–99)
GLUCOSE BLDC GLUCOMTR-MCNC: 126 MG/DL (ref 70–99)
GLUCOSE BLDC GLUCOMTR-MCNC: 131 MG/DL (ref 70–99)
GLUCOSE BLDC GLUCOMTR-MCNC: 131 MG/DL (ref 70–99)
GLUCOSE BLDC GLUCOMTR-MCNC: 132 MG/DL (ref 70–99)
GLUCOSE BLDC GLUCOMTR-MCNC: 135 MG/DL (ref 70–99)
GLUCOSE BLDC GLUCOMTR-MCNC: 137 MG/DL (ref 70–99)
GLUCOSE BLDC GLUCOMTR-MCNC: 139 MG/DL (ref 70–99)
GLUCOSE BLDC GLUCOMTR-MCNC: 139 MG/DL (ref 70–99)
GLUCOSE BLDC GLUCOMTR-MCNC: 142 MG/DL (ref 70–99)
GLUCOSE BLDC GLUCOMTR-MCNC: 142 MG/DL (ref 70–99)
GLUCOSE BLDC GLUCOMTR-MCNC: 145 MG/DL (ref 70–99)
GLUCOSE BLDC GLUCOMTR-MCNC: 148 MG/DL (ref 70–99)
GLUCOSE BLDC GLUCOMTR-MCNC: 153 MG/DL (ref 70–99)
GLUCOSE BLDC GLUCOMTR-MCNC: 153 MG/DL (ref 70–99)
GLUCOSE BLDC GLUCOMTR-MCNC: 158 MG/DL (ref 70–99)
GLUCOSE BLDC GLUCOMTR-MCNC: 158 MG/DL (ref 70–99)
GLUCOSE BLDC GLUCOMTR-MCNC: 163 MG/DL (ref 70–99)
GLUCOSE BLDC GLUCOMTR-MCNC: 163 MG/DL (ref 70–99)
GLUCOSE BLDC GLUCOMTR-MCNC: 166 MG/DL (ref 70–99)
GLUCOSE BLDC GLUCOMTR-MCNC: 168 MG/DL (ref 70–99)
GLUCOSE BLDC GLUCOMTR-MCNC: 171 MG/DL (ref 70–99)
GLUCOSE BLDC GLUCOMTR-MCNC: 172 MG/DL (ref 70–99)
GLUCOSE BLDC GLUCOMTR-MCNC: 175 MG/DL (ref 70–99)
GLUCOSE BLDC GLUCOMTR-MCNC: 192 MG/DL (ref 70–99)
GLUCOSE BLDC GLUCOMTR-MCNC: 193 MG/DL (ref 70–99)
GLUCOSE BLDC GLUCOMTR-MCNC: 196 MG/DL (ref 70–99)
GLUCOSE BLDC GLUCOMTR-MCNC: 200 MG/DL (ref 70–99)
GLUCOSE BLDC GLUCOMTR-MCNC: 201 MG/DL (ref 70–99)
GLUCOSE BLDC GLUCOMTR-MCNC: 209 MG/DL (ref 70–99)
GLUCOSE BLDC GLUCOMTR-MCNC: 215 MG/DL (ref 70–99)
GLUCOSE BLDC GLUCOMTR-MCNC: 218 MG/DL (ref 70–99)
GLUCOSE BLDC GLUCOMTR-MCNC: 227 MG/DL (ref 70–99)
GLUCOSE BLDC GLUCOMTR-MCNC: 249 MG/DL (ref 70–99)
GLUCOSE BLDC GLUCOMTR-MCNC: 256 MG/DL (ref 70–99)
GLUCOSE BLDC GLUCOMTR-MCNC: 280 MG/DL (ref 70–99)
GLUCOSE BLDC GLUCOMTR-MCNC: 80 MG/DL (ref 70–99)
GLUCOSE BLDC GLUCOMTR-MCNC: 92 MG/DL (ref 70–99)
GLUCOSE SERPL-MCNC: 112 MG/DL (ref 70–99)
GLUCOSE SERPL-MCNC: 114 MG/DL (ref 70–99)
GLUCOSE SERPL-MCNC: 122 MG/DL (ref 70–99)
GLUCOSE SERPL-MCNC: 125 MG/DL (ref 70–99)
GLUCOSE SERPL-MCNC: 125 MG/DL (ref 70–99)
GLUCOSE SERPL-MCNC: 127 MG/DL (ref 70–105)
GLUCOSE SERPL-MCNC: 140 MG/DL (ref 70–99)
GLUCOSE SERPL-MCNC: 141 MG/DL (ref 70–99)
GLUCOSE SERPL-MCNC: 154 MG/DL (ref 70–99)
GLUCOSE SERPL-MCNC: 160 MG/DL (ref 70–99)
GLUCOSE SERPL-MCNC: 166 MG/DL (ref 70–99)
GLUCOSE SERPL-MCNC: 171 MG/DL (ref 70–99)
GLUCOSE SERPL-MCNC: 179 MG/DL (ref 70–99)
GLUCOSE SERPL-MCNC: 214 MG/DL (ref 70–99)
GLUCOSE SERPL-MCNC: 224 MG/DL (ref 70–99)
GLUCOSE SERPL-MCNC: 236 MG/DL (ref 70–105)
GLUCOSE SERPL-MCNC: 406 MG/DL (ref 70–99)
GLUCOSE UR STRIP-MCNC: NEGATIVE MG/DL
GRAM STN SPEC: ABNORMAL
HBA1C MFR BLD: 10.4 % (ref 0–5.6)
HBA1C MFR BLD: 11.4 % (ref 0–5.6)
HBA1C MFR BLD: 12.6 % (ref 0–5.6)
HBA1C MFR BLD: 14.4 % (ref 0–5.6)
HBA1C MFR BLD: >15 % (ref 0–5.6)
HCT VFR BLD AUTO: 37.9 % (ref 40–53)
HCT VFR BLD AUTO: 39.1 % (ref 40–53)
HCT VFR BLD AUTO: 39.8 % (ref 40–53)
HCT VFR BLD AUTO: 40.1 % (ref 40–53)
HCT VFR BLD AUTO: 41.5 % (ref 40–53)
HCT VFR BLD AUTO: 41.6 % (ref 40–53)
HCT VFR BLD AUTO: 41.7 % (ref 40–53)
HCT VFR BLD AUTO: 42 % (ref 40–53)
HCT VFR BLD AUTO: 44.8 % (ref 40–53)
HCT VFR BLD AUTO: 45.3 % (ref 40–53)
HCV AB SERPL QL IA: NONREACTIVE
HDLC SERPL-MCNC: 41 MG/DL
HDLC SERPL-MCNC: 42 MG/DL
HGB BLD-MCNC: 13 G/DL (ref 13.3–17.7)
HGB BLD-MCNC: 13.1 G/DL (ref 13.3–17.7)
HGB BLD-MCNC: 13.4 G/DL (ref 13.3–17.7)
HGB BLD-MCNC: 13.7 G/DL (ref 13.3–17.7)
HGB BLD-MCNC: 13.8 G/DL (ref 13.3–17.7)
HGB BLD-MCNC: 13.8 G/DL (ref 13.3–17.7)
HGB BLD-MCNC: 14 G/DL (ref 13.3–17.7)
HGB BLD-MCNC: 14.1 G/DL (ref 13.3–17.7)
HGB BLD-MCNC: 14.1 G/DL (ref 13.3–17.7)
HGB BLD-MCNC: 14.9 G/DL (ref 13.3–17.7)
HGB BLD-MCNC: 15.5 G/DL (ref 13.3–17.7)
HGB UR QL STRIP: NEGATIVE
HIV 1+2 AB+HIV1 P24 AG SERPL QL IA: NONREACTIVE
HIV 1+2 AB+HIV1 P24 AG SERPL QL IA: NONREACTIVE
IGA SERPL-MCNC: 241 MG/DL (ref 70–380)
IGA SERPL-MCNC: 287 MG/DL (ref 70–380)
IGG SERPL-MCNC: 1150 MG/DL (ref 695–1620)
IGM SERPL-MCNC: 58 MG/DL (ref 60–265)
IMM GRANULOCYTES # BLD: 0 10E9/L (ref 0–0.4)
IMM GRANULOCYTES # BLD: 0 10E9/L (ref 0–0.4)
IMM GRANULOCYTES # BLD: 0.2 10E9/L (ref 0–0.4)
IMM GRANULOCYTES # BLD: 0.3 10E9/L (ref 0–0.4)
IMM GRANULOCYTES NFR BLD: 0.4 %
IMM GRANULOCYTES NFR BLD: 0.4 %
IMM GRANULOCYTES NFR BLD: 0.8 %
IMM GRANULOCYTES NFR BLD: 1.4 %
INSULIN SERPL-ACNC: 8.1 MU/L (ref 3–25)
KETONES UR STRIP-MCNC: NEGATIVE MG/DL
LAB SCANNED RESULT: NORMAL
LACTATE BLD-SCNC: 0.5 MMOL/L (ref 0.7–2.1)
LACTATE BLD-SCNC: 0.7 MMOL/L (ref 0.7–2)
LACTATE BLD-SCNC: 0.9 MMOL/L (ref 0.7–2)
LACTATE BLD-SCNC: 1.2 MMOL/L (ref 0.7–2.1)
LACTATE BLD-SCNC: 1.5 MMOL/L (ref 0.7–2)
LACTATE BLD-SCNC: NORMAL MMOL/L (ref 0.7–2)
LDLC SERPL CALC-MCNC: 117 MG/DL
LDLC SERPL CALC-MCNC: 190 MG/DL
LEUKOCYTE ESTERASE UR QL STRIP: NEGATIVE
LIPASE SERPL-CCNC: 45 U/L (ref 73–393)
LYMPHOCYTES # BLD AUTO: 2 10E9/L (ref 0.8–5.3)
LYMPHOCYTES # BLD AUTO: 2.6 10E9/L (ref 0.8–5.3)
LYMPHOCYTES # BLD AUTO: 2.6 10E9/L (ref 0.8–5.3)
LYMPHOCYTES # BLD AUTO: 3.2 10E9/L (ref 0.8–5.3)
LYMPHOCYTES # BLD AUTO: 3.5 10E9/L (ref 0.8–5.3)
LYMPHOCYTES NFR BLD AUTO: 10.5 %
LYMPHOCYTES NFR BLD AUTO: 10.9 %
LYMPHOCYTES NFR BLD AUTO: 24.2 %
LYMPHOCYTES NFR BLD AUTO: 29.3 %
LYMPHOCYTES NFR BLD AUTO: 33.9 %
Lab: ABNORMAL
Lab: NORMAL
Lab: NORMAL
M PROTEIN MFR UR ELPH: 0 %
M PROTEIN SERPL ELPH-MCNC: 0 G/DL
MAGNESIUM SERPL-MCNC: 2 MG/DL (ref 1.6–2.3)
MCH RBC QN AUTO: 29.6 PG (ref 26.5–33)
MCH RBC QN AUTO: 30 PG (ref 26.5–33)
MCH RBC QN AUTO: 30.2 PG (ref 26.5–33)
MCH RBC QN AUTO: 30.2 PG (ref 26.5–33)
MCH RBC QN AUTO: 30.4 PG (ref 26.5–33)
MCH RBC QN AUTO: 30.5 PG (ref 26.5–33)
MCH RBC QN AUTO: 30.5 PG (ref 26.5–33)
MCH RBC QN AUTO: 30.7 PG (ref 26.5–33)
MCH RBC QN AUTO: 30.7 PG (ref 26.5–33)
MCH RBC QN AUTO: 31.1 PG (ref 26.5–33)
MCHC RBC AUTO-ENTMCNC: 32.7 G/DL (ref 31.5–36.5)
MCHC RBC AUTO-ENTMCNC: 32.9 G/DL (ref 31.5–36.5)
MCHC RBC AUTO-ENTMCNC: 33.3 G/DL (ref 31.5–36.5)
MCHC RBC AUTO-ENTMCNC: 33.3 G/DL (ref 31.5–36.5)
MCHC RBC AUTO-ENTMCNC: 33.6 G/DL (ref 31.5–36.5)
MCHC RBC AUTO-ENTMCNC: 33.8 G/DL (ref 31.5–36.5)
MCHC RBC AUTO-ENTMCNC: 34.2 G/DL (ref 31.5–36.5)
MCHC RBC AUTO-ENTMCNC: 34.3 G/DL (ref 31.5–36.5)
MCHC RBC AUTO-ENTMCNC: 34.3 G/DL (ref 31.5–36.5)
MCHC RBC AUTO-ENTMCNC: 34.7 G/DL (ref 31.5–36.5)
MCV RBC AUTO: 87 FL (ref 78–100)
MCV RBC AUTO: 88 FL (ref 78–100)
MCV RBC AUTO: 89 FL (ref 78–100)
MCV RBC AUTO: 89 FL (ref 78–100)
MCV RBC AUTO: 92 FL (ref 78–100)
METHYLMALONATE SERPL-SCNC: 0.23 UMOL/L (ref 0–0.4)
MICROALBUMIN UR-MCNC: 1610 MG/L
MICROALBUMIN/CREAT UR: 3326.45 MG/G CR (ref 0–17)
MONOCYTES # BLD AUTO: 0.9 10E9/L (ref 0–1.3)
MONOCYTES # BLD AUTO: 1 10E9/L (ref 0–1.3)
MONOCYTES # BLD AUTO: 1.1 10E9/L (ref 0–1.3)
MONOCYTES # BLD AUTO: 2.4 10E9/L (ref 0–1.3)
MONOCYTES # BLD AUTO: 3 10E9/L (ref 0–1.3)
MONOCYTES NFR BLD AUTO: 10.1 %
MONOCYTES NFR BLD AUTO: 12.7 %
MONOCYTES NFR BLD AUTO: 13.1 %
MONOCYTES NFR BLD AUTO: 8.4 %
MONOCYTES NFR BLD AUTO: 9.2 %
NEUTROPHILS # BLD AUTO: 13.3 10E9/L (ref 1.6–8.3)
NEUTROPHILS # BLD AUTO: 17.3 10E9/L (ref 1.6–8.3)
NEUTROPHILS # BLD AUTO: 5.2 10E9/L (ref 1.6–8.3)
NEUTROPHILS # BLD AUTO: 6.2 10E9/L (ref 1.6–8.3)
NEUTROPHILS # BLD AUTO: 6.6 10E9/L (ref 1.6–8.3)
NEUTROPHILS NFR BLD AUTO: 50.6 %
NEUTROPHILS NFR BLD AUTO: 56.8 %
NEUTROPHILS NFR BLD AUTO: 61 %
NEUTROPHILS NFR BLD AUTO: 71.7 %
NEUTROPHILS NFR BLD AUTO: 74.1 %
NITRATE UR QL: NEGATIVE
NONHDLC SERPL-MCNC: 176 MG/DL
NONHDLC SERPL-MCNC: 244 MG/DL
NRBC # BLD AUTO: 0 10*3/UL
NRBC BLD AUTO-RTO: 0 /100
NT-PROBNP SERPL-MCNC: 1120 PG/ML (ref 0–900)
NT-PROBNP SERPL-MCNC: 444 PG/ML (ref 0–125)
NT-PROBNP SERPL-MCNC: 497 PG/ML (ref 0–900)
NT-PROBNP SERPL-MCNC: 573 PG/ML (ref 0–125)
PCO2 BLDV: 40 MM HG (ref 40–50)
PCO2 BLDV: 46 MM HG (ref 40–50)
PH BLDV: 7.4 PH (ref 7.32–7.43)
PH BLDV: 7.42 PH (ref 7.32–7.43)
PH UR STRIP: 5 PH (ref 5–7)
PHOSPHATE SERPL-MCNC: 4.2 MG/DL (ref 2.5–4.5)
PHOSPHATE SERPL-MCNC: 4.2 MG/DL (ref 2.5–4.5)
PLATELET # BLD AUTO: 228 10E9/L (ref 150–450)
PLATELET # BLD AUTO: 231 10E9/L (ref 150–450)
PLATELET # BLD AUTO: 236 10E9/L (ref 150–450)
PLATELET # BLD AUTO: 243 10E9/L (ref 150–450)
PLATELET # BLD AUTO: 247 10E9/L (ref 150–450)
PLATELET # BLD AUTO: 247 10E9/L (ref 150–450)
PLATELET # BLD AUTO: 260 10E9/L (ref 150–450)
PLATELET # BLD AUTO: 288 10E9/L (ref 150–450)
PLATELET # BLD AUTO: 307 10E9/L (ref 150–450)
PLATELET # BLD AUTO: 372 10E9/L (ref 150–450)
PO2 BLDV: 28 MM HG (ref 25–47)
PO2 BLDV: 37 MM HG (ref 25–47)
POTASSIUM SERPL-SCNC: 3.4 MMOL/L (ref 3.4–5.3)
POTASSIUM SERPL-SCNC: 3.5 MMOL/L (ref 3.4–5.3)
POTASSIUM SERPL-SCNC: 3.6 MMOL/L (ref 3.4–5.3)
POTASSIUM SERPL-SCNC: 3.7 MMOL/L (ref 3.4–5.3)
POTASSIUM SERPL-SCNC: 3.7 MMOL/L (ref 3.4–5.3)
POTASSIUM SERPL-SCNC: 3.8 MMOL/L (ref 3.4–5.3)
POTASSIUM SERPL-SCNC: 4 MMOL/L (ref 3.4–5.3)
POTASSIUM SERPL-SCNC: 4.3 MMOL/L (ref 3.5–5.1)
POTASSIUM SERPL-SCNC: 4.5 MMOL/L (ref 3.4–5.3)
POTASSIUM SERPL-SCNC: 4.6 MMOL/L (ref 3.4–5.3)
POTASSIUM SERPL-SCNC: 4.7 MMOL/L (ref 3.5–5.1)
PROT PATTERN SERPL ELPH-IMP: NORMAL
PROT PATTERN SERPL IFE-IMP: ABNORMAL
PROT PATTERN UR ELPH-IMP: ABNORMAL
PROT SERPL-MCNC: 6.3 G/DL (ref 6.8–8.8)
PROT SERPL-MCNC: 6.8 G/DL (ref 6.8–8.8)
PROT UR-MCNC: 0.38 G/L
PROT/CREAT 24H UR: 0.43 G/G CR (ref 0–0.2)
RBC # BLD AUTO: 4.28 10E12/L (ref 4.4–5.9)
RBC # BLD AUTO: 4.36 10E12/L (ref 4.4–5.9)
RBC # BLD AUTO: 4.44 10E12/L (ref 4.4–5.9)
RBC # BLD AUTO: 4.49 10E12/L (ref 4.4–5.9)
RBC # BLD AUTO: 4.53 10E12/L (ref 4.4–5.9)
RBC # BLD AUTO: 4.54 10E12/L (ref 4.4–5.9)
RBC # BLD AUTO: 4.54 10E12/L (ref 4.4–5.9)
RBC # BLD AUTO: 4.59 10E12/L (ref 4.4–5.9)
RBC # BLD AUTO: 4.88 10E12/L (ref 4.4–5.9)
RBC # BLD AUTO: 5.23 10E12/L (ref 4.4–5.9)
RBC #/AREA URNS AUTO: 1 /HPF (ref 0–2)
SAO2 % BLDV FROM PO2: 52 %
SAO2 % BLDV FROM PO2: 70 %
SODIUM SERPL-SCNC: 133 MMOL/L (ref 133–144)
SODIUM SERPL-SCNC: 135 MMOL/L (ref 133–144)
SODIUM SERPL-SCNC: 136 MMOL/L (ref 133–144)
SODIUM SERPL-SCNC: 137 MMOL/L (ref 133–144)
SODIUM SERPL-SCNC: 138 MMOL/L (ref 133–144)
SODIUM SERPL-SCNC: 138 MMOL/L (ref 133–144)
SODIUM SERPL-SCNC: 140 MMOL/L (ref 136–145)
SODIUM SERPL-SCNC: 140 MMOL/L (ref 136–145)
SODIUM SERPL-SCNC: 141 MMOL/L (ref 133–144)
SODIUM SERPL-SCNC: 141 MMOL/L (ref 133–144)
SODIUM SERPL-SCNC: 142 MMOL/L (ref 133–144)
SODIUM SERPL-SCNC: 142 MMOL/L (ref 133–144)
SODIUM SERPL-SCNC: 143 MMOL/L (ref 133–144)
SODIUM SERPL-SCNC: 143 MMOL/L (ref 133–144)
SODIUM SERPL-SCNC: 144 MMOL/L (ref 133–144)
SODIUM SERPL-SCNC: 144 MMOL/L (ref 133–144)
SODIUM UR-SCNC: 25 MMOL/L
SOURCE: ABNORMAL
SP GR UR STRIP: 1.01 (ref 1–1.03)
SPECIMEN SOURCE: ABNORMAL
SPECIMEN SOURCE: NORMAL
SPECIMEN SOURCE: NORMAL
T4 FREE SERPL-MCNC: 0.94 NG/DL (ref 0.76–1.46)
TRIGL SERPL-MCNC: 272 MG/DL
TRIGL SERPL-MCNC: 294 MG/DL
TROPONIN I SERPL-MCNC: 0.06 UG/L (ref 0–0.04)
TROPONIN I SERPL-MCNC: 0.07 UG/L (ref 0–0.04)
TROPONIN I SERPL-MCNC: 0.07 UG/L (ref 0–0.04)
TROPONIN I SERPL-MCNC: 0.08 UG/L (ref 0–0.04)
TSH SERPL DL<=0.005 MIU/L-ACNC: 2.12 MU/L (ref 0.4–4)
TSH SERPL DL<=0.005 MIU/L-ACNC: 2.73 MU/L (ref 0.4–4)
TSH SERPL DL<=0.005 MIU/L-ACNC: 4.34 MU/L (ref 0.4–4)
UROBILINOGEN UR STRIP-MCNC: NORMAL MG/DL (ref 0–2)
VANCOMYCIN SERPL-MCNC: 16.6 MG/L
VANCOMYCIN SERPL-MCNC: 21.1 MG/L
VIT B1 BLD-MCNC: 142 NMOL/L (ref 70–180)
VIT B12 SERPL-MCNC: 3305 PG/ML (ref 193–986)
VIT B6 SERPL-MCNC: 128.3 NMOL/L (ref 20–125)
WBC # BLD AUTO: 10.3 10E9/L (ref 4–11)
WBC # BLD AUTO: 10.8 10E9/L (ref 4–11)
WBC # BLD AUTO: 10.9 10E9/L (ref 4–11)
WBC # BLD AUTO: 11.3 10E9/L (ref 4–11)
WBC # BLD AUTO: 18.6 10E9/L (ref 4–11)
WBC # BLD AUTO: 21 10E9/L (ref 4–11)
WBC # BLD AUTO: 23.4 10E9/L (ref 4–11)
WBC # BLD AUTO: 28.4 10E9/L (ref 4–11)
WBC # BLD AUTO: 9.3 10E9/L (ref 4–11)
WBC # BLD AUTO: 9.3 10E9/L (ref 4–11)
WBC #/AREA URNS AUTO: 1 /HPF (ref 0–5)

## 2019-01-01 PROCEDURE — 87040 BLOOD CULTURE FOR BACTERIA: CPT | Performed by: EMERGENCY MEDICINE

## 2019-01-01 PROCEDURE — 25000131 ZZH RX MED GY IP 250 OP 636 PS 637: Performed by: INTERNAL MEDICINE

## 2019-01-01 PROCEDURE — 87077 CULTURE AEROBIC IDENTIFY: CPT | Performed by: EMERGENCY MEDICINE

## 2019-01-01 PROCEDURE — 80202 ASSAY OF VANCOMYCIN: CPT | Performed by: INTERNAL MEDICINE

## 2019-01-01 PROCEDURE — 99223 1ST HOSP IP/OBS HIGH 75: CPT | Mod: AI | Performed by: HOSPITALIST

## 2019-01-01 PROCEDURE — 97116 GAIT TRAINING THERAPY: CPT | Mod: GP | Performed by: PHYSICAL THERAPIST

## 2019-01-01 PROCEDURE — 80048 BASIC METABOLIC PNL TOTAL CA: CPT | Performed by: PHYSICIAN ASSISTANT

## 2019-01-01 PROCEDURE — 36415 COLL VENOUS BLD VENIPUNCTURE: CPT | Performed by: INTERNAL MEDICINE

## 2019-01-01 PROCEDURE — 93798 PHYS/QHP OP CAR RHAB W/ECG: CPT

## 2019-01-01 PROCEDURE — 93016 CV STRESS TEST SUPVJ ONLY: CPT | Performed by: INTERNAL MEDICINE

## 2019-01-01 PROCEDURE — 97165 OT EVAL LOW COMPLEX 30 MIN: CPT | Mod: GO

## 2019-01-01 PROCEDURE — 99239 HOSP IP/OBS DSCHRG MGMT >30: CPT | Performed by: HOSPITALIST

## 2019-01-01 PROCEDURE — 99214 OFFICE O/P EST MOD 30 MIN: CPT | Performed by: PHYSICIAN ASSISTANT

## 2019-01-01 PROCEDURE — 36415 COLL VENOUS BLD VENIPUNCTURE: CPT | Performed by: PHYSICIAN ASSISTANT

## 2019-01-01 PROCEDURE — 40000116 ZZH STATISTIC OP CR VISIT

## 2019-01-01 PROCEDURE — 40000116 ZZH STATISTIC OP CR VISIT: Performed by: REHABILITATION PRACTITIONER

## 2019-01-01 PROCEDURE — 84300 ASSAY OF URINE SODIUM: CPT | Performed by: INTERNAL MEDICINE

## 2019-01-01 PROCEDURE — 84425 ASSAY OF VITAMIN B-1: CPT | Mod: 90 | Performed by: PSYCHIATRY & NEUROLOGY

## 2019-01-01 PROCEDURE — 00000146 ZZHCL STATISTIC GLUCOSE BY METER IP

## 2019-01-01 PROCEDURE — 25000125 ZZHC RX 250: Performed by: EMERGENCY MEDICINE

## 2019-01-01 PROCEDURE — 82607 VITAMIN B-12: CPT | Performed by: PSYCHIATRY & NEUROLOGY

## 2019-01-01 PROCEDURE — 97112 NEUROMUSCULAR REEDUCATION: CPT | Mod: GP | Performed by: PHYSICAL THERAPIST

## 2019-01-01 PROCEDURE — 96375 TX/PRO/DX INJ NEW DRUG ADDON: CPT

## 2019-01-01 PROCEDURE — 25000128 H RX IP 250 OP 636: Performed by: INTERNAL MEDICINE

## 2019-01-01 PROCEDURE — 40000901 ZZH STATISTIC WOC PT EDUCATION, 0-15 MIN

## 2019-01-01 PROCEDURE — 84484 ASSAY OF TROPONIN QUANT: CPT | Performed by: EMERGENCY MEDICINE

## 2019-01-01 PROCEDURE — 25000128 H RX IP 250 OP 636

## 2019-01-01 PROCEDURE — 36415 COLL VENOUS BLD VENIPUNCTURE: CPT | Performed by: HOSPITALIST

## 2019-01-01 PROCEDURE — 84166 PROTEIN E-PHORESIS/URINE/CSF: CPT | Performed by: PSYCHIATRY & NEUROLOGY

## 2019-01-01 PROCEDURE — 78452 HT MUSCLE IMAGE SPECT MULT: CPT

## 2019-01-01 PROCEDURE — 25000132 ZZH RX MED GY IP 250 OP 250 PS 637: Performed by: PHYSICIAN ASSISTANT

## 2019-01-01 PROCEDURE — 84443 ASSAY THYROID STIM HORMONE: CPT | Performed by: PHYSICIAN ASSISTANT

## 2019-01-01 PROCEDURE — 82565 ASSAY OF CREATININE: CPT | Performed by: PHYSICIAN ASSISTANT

## 2019-01-01 PROCEDURE — A9502 TC99M TETROFOSMIN: HCPCS | Performed by: HOSPITALIST

## 2019-01-01 PROCEDURE — 93798 PHYS/QHP OP CAR RHAB W/ECG: CPT | Performed by: REHABILITATION PRACTITIONER

## 2019-01-01 PROCEDURE — 40000264 ECHOCARDIOGRAM COMPLETE

## 2019-01-01 PROCEDURE — 25000132 ZZH RX MED GY IP 250 OP 250 PS 637: Performed by: INTERNAL MEDICINE

## 2019-01-01 PROCEDURE — 25000125 ZZHC RX 250

## 2019-01-01 PROCEDURE — 21000001 ZZH R&B HEART CARE

## 2019-01-01 PROCEDURE — 25000132 ZZH RX MED GY IP 250 OP 250 PS 637: Performed by: EMERGENCY MEDICINE

## 2019-01-01 PROCEDURE — 99495 TRANSJ CARE MGMT MOD F2F 14D: CPT | Performed by: PHYSICIAN ASSISTANT

## 2019-01-01 PROCEDURE — 10060 I&D ABSCESS SIMPLE/SINGLE: CPT | Performed by: SURGERY

## 2019-01-01 PROCEDURE — 80053 COMPREHEN METABOLIC PANEL: CPT | Performed by: EMERGENCY MEDICINE

## 2019-01-01 PROCEDURE — 99233 SBSQ HOSP IP/OBS HIGH 50: CPT | Performed by: INTERNAL MEDICINE

## 2019-01-01 PROCEDURE — 25800030 ZZH RX IP 258 OP 636: Performed by: INTERNAL MEDICINE

## 2019-01-01 PROCEDURE — 93018 CV STRESS TEST I&R ONLY: CPT | Performed by: INTERNAL MEDICINE

## 2019-01-01 PROCEDURE — 99232 SBSQ HOSP IP/OBS MODERATE 35: CPT | Performed by: PHYSICIAN ASSISTANT

## 2019-01-01 PROCEDURE — 40000575 ZZH STATISTIC OP CARDIAC VISIT #2: Performed by: OCCUPATIONAL THERAPIST

## 2019-01-01 PROCEDURE — 80069 RENAL FUNCTION PANEL: CPT | Performed by: INTERNAL MEDICINE

## 2019-01-01 PROCEDURE — 25000131 ZZH RX MED GY IP 250 OP 636 PS 637: Performed by: HOSPITALIST

## 2019-01-01 PROCEDURE — 93000 ELECTROCARDIOGRAM COMPLETE: CPT | Performed by: PHYSICIAN ASSISTANT

## 2019-01-01 PROCEDURE — 86038 ANTINUCLEAR ANTIBODIES: CPT | Performed by: PSYCHIATRY & NEUROLOGY

## 2019-01-01 PROCEDURE — 83036 HEMOGLOBIN GLYCOSYLATED A1C: CPT | Performed by: PHYSICIAN ASSISTANT

## 2019-01-01 PROCEDURE — 87389 HIV-1 AG W/HIV-1&-2 AB AG IA: CPT | Performed by: PHYSICIAN ASSISTANT

## 2019-01-01 PROCEDURE — 12000000 ZZH R&B MED SURG/OB

## 2019-01-01 PROCEDURE — 71046 X-RAY EXAM CHEST 2 VIEWS: CPT | Mod: FY

## 2019-01-01 PROCEDURE — 97110 THERAPEUTIC EXERCISES: CPT | Mod: GO

## 2019-01-01 PROCEDURE — 99205 OFFICE O/P NEW HI 60 MIN: CPT | Performed by: PSYCHIATRY & NEUROLOGY

## 2019-01-01 PROCEDURE — 82803 BLOOD GASES ANY COMBINATION: CPT

## 2019-01-01 PROCEDURE — 25000125 ZZHC RX 250: Performed by: PHYSICIAN ASSISTANT

## 2019-01-01 PROCEDURE — 80048 BASIC METABOLIC PNL TOTAL CA: CPT | Performed by: INTERNAL MEDICINE

## 2019-01-01 PROCEDURE — 84295 ASSAY OF SERUM SODIUM: CPT | Performed by: INTERNAL MEDICINE

## 2019-01-01 PROCEDURE — 25000132 ZZH RX MED GY IP 250 OP 250 PS 637: Performed by: NURSE PRACTITIONER

## 2019-01-01 PROCEDURE — 25000128 H RX IP 250 OP 636: Performed by: HOSPITALIST

## 2019-01-01 PROCEDURE — 25000128 H RX IP 250 OP 636: Performed by: EMERGENCY MEDICINE

## 2019-01-01 PROCEDURE — 99207 ZZC OFFICE-HOSPITAL ADMIT: CPT | Performed by: PHYSICIAN ASSISTANT

## 2019-01-01 PROCEDURE — 36415 COLL VENOUS BLD VENIPUNCTURE: CPT

## 2019-01-01 PROCEDURE — 84156 ASSAY OF PROTEIN URINE: CPT | Performed by: INTERNAL MEDICINE

## 2019-01-01 PROCEDURE — 40000116 ZZH STATISTIC OP CR VISIT: Performed by: OCCUPATIONAL THERAPIST

## 2019-01-01 PROCEDURE — 10060 I&D ABSCESS SIMPLE/SINGLE: CPT

## 2019-01-01 PROCEDURE — 71046 X-RAY EXAM CHEST 2 VIEWS: CPT

## 2019-01-01 PROCEDURE — 84165 PROTEIN E-PHORESIS SERUM: CPT | Performed by: PSYCHIATRY & NEUROLOGY

## 2019-01-01 PROCEDURE — 00000402 ZZHCL STATISTIC TOTAL PROTEIN: Performed by: PSYCHIATRY & NEUROLOGY

## 2019-01-01 PROCEDURE — 83880 ASSAY OF NATRIURETIC PEPTIDE: CPT | Performed by: EMERGENCY MEDICINE

## 2019-01-01 PROCEDURE — 40000575 ZZH STATISTIC OP CARDIAC VISIT #2

## 2019-01-01 PROCEDURE — 83880 ASSAY OF NATRIURETIC PEPTIDE: CPT | Performed by: PHYSICIAN ASSISTANT

## 2019-01-01 PROCEDURE — 87205 SMEAR GRAM STAIN: CPT | Performed by: EMERGENCY MEDICINE

## 2019-01-01 PROCEDURE — 85027 COMPLETE CBC AUTOMATED: CPT | Performed by: INTERNAL MEDICINE

## 2019-01-01 PROCEDURE — 99285 EMERGENCY DEPT VISIT HI MDM: CPT | Mod: 25

## 2019-01-01 PROCEDURE — 86803 HEPATITIS C AB TEST: CPT | Performed by: PHYSICIAN ASSISTANT

## 2019-01-01 PROCEDURE — 87186 SC STD MICRODIL/AGAR DIL: CPT | Performed by: EMERGENCY MEDICINE

## 2019-01-01 PROCEDURE — 97110 THERAPEUTIC EXERCISES: CPT | Mod: GP | Performed by: PHYSICAL THERAPIST

## 2019-01-01 PROCEDURE — 83921 ORGANIC ACID SINGLE QUANT: CPT | Performed by: PSYCHIATRY & NEUROLOGY

## 2019-01-01 PROCEDURE — 84439 ASSAY OF FREE THYROXINE: CPT | Performed by: PHYSICIAN ASSISTANT

## 2019-01-01 PROCEDURE — 93017 CV STRESS TEST TRACING ONLY: CPT

## 2019-01-01 PROCEDURE — 34300033 ZZH RX 343: Performed by: HOSPITALIST

## 2019-01-01 PROCEDURE — 85025 COMPLETE CBC W/AUTO DIFF WBC: CPT | Performed by: EMERGENCY MEDICINE

## 2019-01-01 PROCEDURE — 96376 TX/PRO/DX INJ SAME DRUG ADON: CPT

## 2019-01-01 PROCEDURE — 40000855 ZZH STATISTIC ECHO STRESS OR NM NPI

## 2019-01-01 PROCEDURE — 83880 ASSAY OF NATRIURETIC PEPTIDE: CPT | Performed by: INTERNAL MEDICINE

## 2019-01-01 PROCEDURE — 86618 LYME DISEASE ANTIBODY: CPT | Performed by: PSYCHIATRY & NEUROLOGY

## 2019-01-01 PROCEDURE — 80048 BASIC METABOLIC PNL TOTAL CA: CPT | Performed by: HOSPITALIST

## 2019-01-01 PROCEDURE — 85652 RBC SED RATE AUTOMATED: CPT | Performed by: PHYSICIAN ASSISTANT

## 2019-01-01 PROCEDURE — 87070 CULTURE OTHR SPECIMN AEROBIC: CPT | Performed by: EMERGENCY MEDICINE

## 2019-01-01 PROCEDURE — 96374 THER/PROPH/DIAG INJ IV PUSH: CPT | Mod: 59

## 2019-01-01 PROCEDURE — 82565 ASSAY OF CREATININE: CPT

## 2019-01-01 PROCEDURE — 83520 IMMUNOASSAY QUANT NOS NONAB: CPT | Mod: 90 | Performed by: PSYCHIATRY & NEUROLOGY

## 2019-01-01 PROCEDURE — 83605 ASSAY OF LACTIC ACID: CPT | Performed by: EMERGENCY MEDICINE

## 2019-01-01 PROCEDURE — 96367 TX/PROPH/DG ADDL SEQ IV INF: CPT

## 2019-01-01 PROCEDURE — 82784 ASSAY IGA/IGD/IGG/IGM EACH: CPT | Performed by: PSYCHIATRY & NEUROLOGY

## 2019-01-01 PROCEDURE — 81001 URINALYSIS AUTO W/SCOPE: CPT | Performed by: INTERNAL MEDICINE

## 2019-01-01 PROCEDURE — 76770 US EXAM ABDO BACK WALL COMP: CPT

## 2019-01-01 PROCEDURE — 99215 OFFICE O/P EST HI 40 MIN: CPT | Performed by: PSYCHIATRY & NEUROLOGY

## 2019-01-01 PROCEDURE — 93798 PHYS/QHP OP CAR RHAB W/ECG: CPT | Performed by: OCCUPATIONAL THERAPIST

## 2019-01-01 PROCEDURE — 97162 PT EVAL MOD COMPLEX 30 MIN: CPT | Mod: GP | Performed by: PHYSICAL THERAPIST

## 2019-01-01 PROCEDURE — 83525 ASSAY OF INSULIN: CPT | Performed by: PHYSICIAN ASSISTANT

## 2019-01-01 PROCEDURE — 99214 OFFICE O/P EST MOD 30 MIN: CPT | Mod: 25

## 2019-01-01 PROCEDURE — 85379 FIBRIN DEGRADATION QUANT: CPT | Performed by: EMERGENCY MEDICINE

## 2019-01-01 PROCEDURE — 85025 COMPLETE CBC W/AUTO DIFF WBC: CPT | Performed by: INTERNAL MEDICINE

## 2019-01-01 PROCEDURE — 99000 SPECIMEN HANDLING OFFICE-LAB: CPT | Performed by: PSYCHIATRY & NEUROLOGY

## 2019-01-01 PROCEDURE — A9585 GADOBUTROL INJECTION: HCPCS | Performed by: PHYSICIAN ASSISTANT

## 2019-01-01 PROCEDURE — 96365 THER/PROPH/DIAG IV INF INIT: CPT

## 2019-01-01 PROCEDURE — 84207 ASSAY OF VITAMIN B-6: CPT | Mod: 90 | Performed by: PSYCHIATRY & NEUROLOGY

## 2019-01-01 PROCEDURE — 25800030 ZZH RX IP 258 OP 636: Performed by: EMERGENCY MEDICINE

## 2019-01-01 PROCEDURE — 87389 HIV-1 AG W/HIV-1&-2 AB AG IA: CPT | Performed by: PSYCHIATRY & NEUROLOGY

## 2019-01-01 PROCEDURE — 83605 ASSAY OF LACTIC ACID: CPT | Performed by: PHYSICIAN ASSISTANT

## 2019-01-01 PROCEDURE — 80061 LIPID PANEL: CPT | Performed by: PHYSICIAN ASSISTANT

## 2019-01-01 PROCEDURE — 82784 ASSAY IGA/IGD/IGG/IGM EACH: CPT | Performed by: PHYSICIAN ASSISTANT

## 2019-01-01 PROCEDURE — 0W9L0ZZ DRAINAGE OF LOWER BACK, OPEN APPROACH: ICD-10-PCS | Performed by: EMERGENCY MEDICINE

## 2019-01-01 PROCEDURE — 82565 ASSAY OF CREATININE: CPT | Performed by: INTERNAL MEDICINE

## 2019-01-01 PROCEDURE — 85027 COMPLETE CBC AUTOMATED: CPT | Performed by: PHYSICIAN ASSISTANT

## 2019-01-01 PROCEDURE — 84446 ASSAY OF VITAMIN E: CPT | Mod: 90 | Performed by: PSYCHIATRY & NEUROLOGY

## 2019-01-01 PROCEDURE — 75563 CARD MRI W/STRESS IMG & DYE: CPT | Mod: 26 | Performed by: INTERNAL MEDICINE

## 2019-01-01 PROCEDURE — 84484 ASSAY OF TROPONIN QUANT: CPT | Performed by: HOSPITALIST

## 2019-01-01 PROCEDURE — 86334 IMMUNOFIX E-PHORESIS SERUM: CPT | Performed by: PSYCHIATRY & NEUROLOGY

## 2019-01-01 PROCEDURE — 0J970ZZ DRAINAGE OF BACK SUBCUTANEOUS TISSUE AND FASCIA, OPEN APPROACH: ICD-10-PCS | Performed by: SURGERY

## 2019-01-01 PROCEDURE — 83605 ASSAY OF LACTIC ACID: CPT | Performed by: INTERNAL MEDICINE

## 2019-01-01 PROCEDURE — 99221 1ST HOSP IP/OBS SF/LOW 40: CPT | Performed by: SURGERY

## 2019-01-01 PROCEDURE — 83605 ASSAY OF LACTIC ACID: CPT

## 2019-01-01 PROCEDURE — 78452 HT MUSCLE IMAGE SPECT MULT: CPT | Mod: 26 | Performed by: INTERNAL MEDICINE

## 2019-01-01 PROCEDURE — 25800030 ZZH RX IP 258 OP 636

## 2019-01-01 PROCEDURE — 36415 COLL VENOUS BLD VENIPUNCTURE: CPT | Performed by: PSYCHIATRY & NEUROLOGY

## 2019-01-01 PROCEDURE — 83036 HEMOGLOBIN GLYCOSYLATED A1C: CPT | Performed by: EMERGENCY MEDICINE

## 2019-01-01 PROCEDURE — 99207 ZZC APP CREDIT; MD BILLING SHARED VISIT: CPT | Performed by: PHYSICIAN ASSISTANT

## 2019-01-01 PROCEDURE — 85025 COMPLETE CBC W/AUTO DIFF WBC: CPT | Performed by: PHYSICIAN ASSISTANT

## 2019-01-01 PROCEDURE — 97110 THERAPEUTIC EXERCISES: CPT | Mod: GO | Performed by: OCCUPATIONAL THERAPIST

## 2019-01-01 PROCEDURE — 83735 ASSAY OF MAGNESIUM: CPT | Performed by: HOSPITALIST

## 2019-01-01 PROCEDURE — 85652 RBC SED RATE AUTOMATED: CPT | Performed by: PSYCHIATRY & NEUROLOGY

## 2019-01-01 PROCEDURE — 40000116 ZZH STATISTIC OP CR VISIT: Performed by: CLINICAL EXERCISE PHYSIOLOGIST

## 2019-01-01 PROCEDURE — 99232 SBSQ HOSP IP/OBS MODERATE 35: CPT | Performed by: INTERNAL MEDICINE

## 2019-01-01 PROCEDURE — 25000125 ZZHC RX 250: Performed by: SURGERY

## 2019-01-01 PROCEDURE — 85025 COMPLETE CBC W/AUTO DIFF WBC: CPT | Performed by: HOSPITALIST

## 2019-01-01 PROCEDURE — 80053 COMPREHEN METABOLIC PANEL: CPT | Performed by: HOSPITALIST

## 2019-01-01 PROCEDURE — 80048 BASIC METABOLIC PNL TOTAL CA: CPT | Performed by: EMERGENCY MEDICINE

## 2019-01-01 PROCEDURE — 85018 HEMOGLOBIN: CPT | Performed by: PHYSICIAN ASSISTANT

## 2019-01-01 PROCEDURE — 99024 POSTOP FOLLOW-UP VISIT: CPT | Performed by: SURGERY

## 2019-01-01 PROCEDURE — 71275 CT ANGIOGRAPHY CHEST: CPT

## 2019-01-01 PROCEDURE — 86235 NUCLEAR ANTIGEN ANTIBODY: CPT | Performed by: PSYCHIATRY & NEUROLOGY

## 2019-01-01 PROCEDURE — 25500064 ZZH RX 255 OP 636: Performed by: HOSPITALIST

## 2019-01-01 PROCEDURE — 80061 LIPID PANEL: CPT | Performed by: INTERNAL MEDICINE

## 2019-01-01 PROCEDURE — 93306 TTE W/DOPPLER COMPLETE: CPT | Mod: 26 | Performed by: INTERNAL MEDICINE

## 2019-01-01 PROCEDURE — 99214 OFFICE O/P EST MOD 30 MIN: CPT | Performed by: INTERNAL MEDICINE

## 2019-01-01 PROCEDURE — 99232 SBSQ HOSP IP/OBS MODERATE 35: CPT | Performed by: HOSPITALIST

## 2019-01-01 PROCEDURE — 99203 OFFICE O/P NEW LOW 30 MIN: CPT | Performed by: PODIATRIST

## 2019-01-01 PROCEDURE — 99223 1ST HOSP IP/OBS HIGH 75: CPT | Performed by: INTERNAL MEDICINE

## 2019-01-01 PROCEDURE — 83690 ASSAY OF LIPASE: CPT | Performed by: EMERGENCY MEDICINE

## 2019-01-01 PROCEDURE — 84443 ASSAY THYROID STIM HORMONE: CPT | Performed by: PSYCHIATRY & NEUROLOGY

## 2019-01-01 PROCEDURE — 99223 1ST HOSP IP/OBS HIGH 75: CPT | Mod: AI | Performed by: INTERNAL MEDICINE

## 2019-01-01 PROCEDURE — 25500064 ZZH RX 255 OP 636: Performed by: PHYSICIAN ASSISTANT

## 2019-01-01 PROCEDURE — 25000131 ZZH RX MED GY IP 250 OP 636 PS 637: Performed by: EMERGENCY MEDICINE

## 2019-01-01 PROCEDURE — 93005 ELECTROCARDIOGRAM TRACING: CPT

## 2019-01-01 PROCEDURE — 82043 UR ALBUMIN QUANTITATIVE: CPT | Performed by: PHYSICIAN ASSISTANT

## 2019-01-01 PROCEDURE — G0463 HOSPITAL OUTPT CLINIC VISIT: HCPCS

## 2019-01-01 PROCEDURE — 93797 PHYS/QHP OP CAR RHAB WO ECG: CPT | Performed by: OCCUPATIONAL THERAPIST

## 2019-01-01 PROCEDURE — 93798 PHYS/QHP OP CAR RHAB W/ECG: CPT | Performed by: CLINICAL EXERCISE PHYSIOLOGIST

## 2019-01-01 PROCEDURE — 93797 PHYS/QHP OP CAR RHAB WO ECG: CPT

## 2019-01-01 RX ORDER — ONDANSETRON 4 MG/1
4 TABLET, ORALLY DISINTEGRATING ORAL EVERY 6 HOURS PRN
Status: DISCONTINUED | OUTPATIENT
Start: 2019-01-01 | End: 2019-01-01 | Stop reason: HOSPADM

## 2019-01-01 RX ORDER — EZETIMIBE 10 MG/1
10 TABLET ORAL AT BEDTIME
Qty: 30 TABLET | Refills: 11 | Status: SHIPPED | OUTPATIENT
Start: 2019-01-01

## 2019-01-01 RX ORDER — ACETAMINOPHEN 650 MG/1
650 SUPPOSITORY RECTAL EVERY 4 HOURS PRN
Status: DISCONTINUED | OUTPATIENT
Start: 2019-01-01 | End: 2019-01-01 | Stop reason: HOSPADM

## 2019-01-01 RX ORDER — PIPERACILLIN SODIUM, TAZOBACTAM SODIUM 4; .5 G/20ML; G/20ML
4.5 INJECTION, POWDER, LYOPHILIZED, FOR SOLUTION INTRAVENOUS ONCE
Status: COMPLETED | OUTPATIENT
Start: 2019-01-01 | End: 2019-01-01

## 2019-01-01 RX ORDER — NICOTINE POLACRILEX 4 MG
15-30 LOZENGE BUCCAL
Status: DISCONTINUED | OUTPATIENT
Start: 2019-01-01 | End: 2019-01-01 | Stop reason: HOSPADM

## 2019-01-01 RX ORDER — AMINOPHYLLINE 25 MG/ML
50-100 INJECTION, SOLUTION INTRAVENOUS
Status: DISCONTINUED | OUTPATIENT
Start: 2019-01-01 | End: 2019-01-01

## 2019-01-01 RX ORDER — LISINOPRIL 40 MG/1
40 TABLET ORAL DAILY
Qty: 90 TABLET | Refills: 0 | Status: SHIPPED | OUTPATIENT
Start: 2019-01-01 | End: 2019-01-01

## 2019-01-01 RX ORDER — BISACODYL 10 MG
10 SUPPOSITORY, RECTAL RECTAL DAILY PRN
Status: DISCONTINUED | OUTPATIENT
Start: 2019-01-01 | End: 2019-01-01 | Stop reason: HOSPADM

## 2019-01-01 RX ORDER — POTASSIUM CHLORIDE 7.45 MG/ML
10 INJECTION INTRAVENOUS
Status: DISCONTINUED | OUTPATIENT
Start: 2019-01-01 | End: 2019-01-01 | Stop reason: HOSPADM

## 2019-01-01 RX ORDER — POTASSIUM CHLORIDE 29.8 MG/ML
20 INJECTION INTRAVENOUS
Status: DISCONTINUED | OUTPATIENT
Start: 2019-01-01 | End: 2019-01-01 | Stop reason: HOSPADM

## 2019-01-01 RX ORDER — DIAZEPAM 5 MG
5 TABLET ORAL EVERY 30 MIN PRN
Status: DISCONTINUED | OUTPATIENT
Start: 2019-01-01 | End: 2019-01-01 | Stop reason: HOSPADM

## 2019-01-01 RX ORDER — CEFAZOLIN SODIUM 1 G/3ML
1 INJECTION, POWDER, FOR SOLUTION INTRAMUSCULAR; INTRAVENOUS EVERY 12 HOURS
Status: DISCONTINUED | OUTPATIENT
Start: 2019-01-01 | End: 2019-01-01

## 2019-01-01 RX ORDER — ACETAMINOPHEN 325 MG/1
650 TABLET ORAL EVERY 4 HOURS PRN
Status: DISCONTINUED | OUTPATIENT
Start: 2019-01-01 | End: 2019-01-01 | Stop reason: HOSPADM

## 2019-01-01 RX ORDER — NALOXONE HYDROCHLORIDE 0.4 MG/ML
.1-.4 INJECTION, SOLUTION INTRAMUSCULAR; INTRAVENOUS; SUBCUTANEOUS
Status: DISCONTINUED | OUTPATIENT
Start: 2019-01-01 | End: 2019-01-01 | Stop reason: HOSPADM

## 2019-01-01 RX ORDER — FUROSEMIDE 40 MG
TABLET ORAL
Qty: 135 TABLET | Refills: 3 | Status: SHIPPED | OUTPATIENT
Start: 2019-01-01 | End: 2020-01-01

## 2019-01-01 RX ORDER — REGADENOSON 0.08 MG/ML
0.4 INJECTION, SOLUTION INTRAVENOUS ONCE
Status: DISCONTINUED | OUTPATIENT
Start: 2019-01-01 | End: 2019-01-01

## 2019-01-01 RX ORDER — DIPHENHYDRAMINE HYDROCHLORIDE 50 MG/ML
25-50 INJECTION INTRAMUSCULAR; INTRAVENOUS
Status: DISCONTINUED | OUTPATIENT
Start: 2019-01-01 | End: 2019-01-01 | Stop reason: HOSPADM

## 2019-01-01 RX ORDER — CARVEDILOL 25 MG/1
25 TABLET ORAL 2 TIMES DAILY WITH MEALS
Qty: 60 TABLET | Refills: 11 | Status: ON HOLD | OUTPATIENT
Start: 2019-01-01 | End: 2020-01-01

## 2019-01-01 RX ORDER — POLYETHYLENE GLYCOL 3350 17 G/17G
17 POWDER, FOR SOLUTION ORAL DAILY PRN
Status: DISCONTINUED | OUTPATIENT
Start: 2019-01-01 | End: 2019-01-01 | Stop reason: HOSPADM

## 2019-01-01 RX ORDER — ALBUTEROL SULFATE 90 UG/1
2 AEROSOL, METERED RESPIRATORY (INHALATION) EVERY 5 MIN PRN
Status: DISCONTINUED | OUTPATIENT
Start: 2019-01-01 | End: 2019-01-01

## 2019-01-01 RX ORDER — LORAZEPAM 1 MG/1
TABLET ORAL
Qty: 5 TABLET | Refills: 1 | Status: ON HOLD | OUTPATIENT
Start: 2019-01-01 | End: 2019-01-01

## 2019-01-01 RX ORDER — LIDOCAINE 40 MG/G
CREAM TOPICAL
Status: DISCONTINUED | OUTPATIENT
Start: 2019-01-01 | End: 2019-01-01 | Stop reason: HOSPADM

## 2019-01-01 RX ORDER — MAGNESIUM SULFATE HEPTAHYDRATE 40 MG/ML
4 INJECTION, SOLUTION INTRAVENOUS EVERY 4 HOURS PRN
Status: DISCONTINUED | OUTPATIENT
Start: 2019-01-01 | End: 2019-01-01 | Stop reason: HOSPADM

## 2019-01-01 RX ORDER — ONDANSETRON 2 MG/ML
4 INJECTION INTRAMUSCULAR; INTRAVENOUS
Status: DISCONTINUED | OUTPATIENT
Start: 2019-01-01 | End: 2019-01-01 | Stop reason: HOSPADM

## 2019-01-01 RX ORDER — CAFFEINE CITRATE 20 MG/ML
60 SOLUTION INTRAVENOUS
Status: DISCONTINUED | OUTPATIENT
Start: 2019-01-01 | End: 2019-01-01 | Stop reason: HOSPADM

## 2019-01-01 RX ORDER — METHYLPREDNISOLONE SODIUM SUCCINATE 125 MG/2ML
125 INJECTION, POWDER, LYOPHILIZED, FOR SOLUTION INTRAMUSCULAR; INTRAVENOUS
Status: DISCONTINUED | OUTPATIENT
Start: 2019-01-01 | End: 2019-01-01 | Stop reason: HOSPADM

## 2019-01-01 RX ORDER — PROCHLORPERAZINE 25 MG
25 SUPPOSITORY, RECTAL RECTAL EVERY 12 HOURS PRN
Status: DISCONTINUED | OUTPATIENT
Start: 2019-01-01 | End: 2019-01-01 | Stop reason: HOSPADM

## 2019-01-01 RX ORDER — LIDOCAINE 40 MG/G
CREAM TOPICAL
Status: COMPLETED
Start: 2019-01-01 | End: 2019-01-01

## 2019-01-01 RX ORDER — SPIRONOLACTONE 25 MG/1
25 TABLET ORAL DAILY
Qty: 30 TABLET | Refills: 0 | Status: SHIPPED | OUTPATIENT
Start: 2019-01-01 | End: 2019-01-01

## 2019-01-01 RX ORDER — MULTIVIT-MIN/IRON/FOLIC ACID/K 18-600-40
2 CAPSULE ORAL AT BEDTIME
COMMUNITY

## 2019-01-01 RX ORDER — OXYCODONE HYDROCHLORIDE 5 MG/1
5 TABLET ORAL EVERY 4 HOURS PRN
Status: DISCONTINUED | OUTPATIENT
Start: 2019-01-01 | End: 2019-01-01 | Stop reason: HOSPADM

## 2019-01-01 RX ORDER — HYDROMORPHONE HYDROCHLORIDE 1 MG/ML
.3-.5 INJECTION, SOLUTION INTRAMUSCULAR; INTRAVENOUS; SUBCUTANEOUS
Status: DISCONTINUED | OUTPATIENT
Start: 2019-01-01 | End: 2019-01-01 | Stop reason: HOSPADM

## 2019-01-01 RX ORDER — LOSARTAN POTASSIUM 50 MG/1
50 TABLET ORAL DAILY
Qty: 30 TABLET | Refills: 0 | Status: SHIPPED | OUTPATIENT
Start: 2019-01-01 | End: 2019-01-01

## 2019-01-01 RX ORDER — LOSARTAN POTASSIUM 50 MG/1
50 TABLET ORAL DAILY
Status: DISCONTINUED | OUTPATIENT
Start: 2019-01-01 | End: 2019-01-01 | Stop reason: HOSPADM

## 2019-01-01 RX ORDER — CEFAZOLIN SODIUM 2 G/100ML
2 INJECTION, SOLUTION INTRAVENOUS EVERY 8 HOURS
Status: DISCONTINUED | OUTPATIENT
Start: 2019-01-01 | End: 2019-01-01 | Stop reason: HOSPADM

## 2019-01-01 RX ORDER — POTASSIUM CHLORIDE 1.5 G/1.58G
20-40 POWDER, FOR SOLUTION ORAL
Status: DISCONTINUED | OUTPATIENT
Start: 2019-01-01 | End: 2019-01-01 | Stop reason: HOSPADM

## 2019-01-01 RX ORDER — FUROSEMIDE 10 MG/ML
40 INJECTION INTRAMUSCULAR; INTRAVENOUS
Status: DISCONTINUED | OUTPATIENT
Start: 2019-01-01 | End: 2019-01-01

## 2019-01-01 RX ORDER — SPIRONOLACTONE 25 MG/1
25 TABLET ORAL DAILY
Status: DISCONTINUED | OUTPATIENT
Start: 2019-01-01 | End: 2019-01-01 | Stop reason: HOSPADM

## 2019-01-01 RX ORDER — NICOTINE POLACRILEX 4 MG
15-30 LOZENGE BUCCAL
Status: DISCONTINUED | OUTPATIENT
Start: 2019-01-01 | End: 2019-01-01

## 2019-01-01 RX ORDER — METFORMIN HCL 500 MG
2000 TABLET, EXTENDED RELEASE 24 HR ORAL
Qty: 360 TABLET | Refills: 1 | Status: SHIPPED | OUTPATIENT
Start: 2019-01-01 | End: 2019-01-01

## 2019-01-01 RX ORDER — EZETIMIBE 10 MG/1
10 TABLET ORAL AT BEDTIME
Qty: 30 TABLET | Refills: 0 | Status: SHIPPED | OUTPATIENT
Start: 2019-01-01 | End: 2019-01-01

## 2019-01-01 RX ORDER — LISINOPRIL AND HYDROCHLOROTHIAZIDE 12.5; 2 MG/1; MG/1
1 TABLET ORAL AT BEDTIME
Status: DISCONTINUED | OUTPATIENT
Start: 2019-01-01 | End: 2019-01-01

## 2019-01-01 RX ORDER — LIDOCAINE 40 MG/G
CREAM TOPICAL
Status: DISCONTINUED | OUTPATIENT
Start: 2019-01-01 | End: 2019-01-01

## 2019-01-01 RX ORDER — CYANOCOBALAMIN 1000 UG/ML
1000 INJECTION, SOLUTION INTRAMUSCULAR; SUBCUTANEOUS
COMMUNITY

## 2019-01-01 RX ORDER — PIPERACILLIN SODIUM, TAZOBACTAM SODIUM 3; .375 G/15ML; G/15ML
3.38 INJECTION, POWDER, LYOPHILIZED, FOR SOLUTION INTRAVENOUS EVERY 6 HOURS
Status: DISCONTINUED | OUTPATIENT
Start: 2019-01-01 | End: 2019-01-01

## 2019-01-01 RX ORDER — CYANOCOBALAMIN 1000 UG/ML
1000 INJECTION, SOLUTION INTRAMUSCULAR; SUBCUTANEOUS
Status: DISCONTINUED | OUTPATIENT
Start: 2019-01-01 | End: 2019-01-01 | Stop reason: HOSPADM

## 2019-01-01 RX ORDER — CARVEDILOL 12.5 MG/1
12.5 TABLET ORAL 2 TIMES DAILY WITH MEALS
Status: DISCONTINUED | OUTPATIENT
Start: 2019-01-01 | End: 2019-01-01

## 2019-01-01 RX ORDER — ALBUTEROL SULFATE 90 UG/1
2 AEROSOL, METERED RESPIRATORY (INHALATION) EVERY 5 MIN PRN
Status: DISCONTINUED | OUTPATIENT
Start: 2019-01-01 | End: 2019-01-01 | Stop reason: HOSPADM

## 2019-01-01 RX ORDER — FUROSEMIDE 10 MG/ML
INJECTION INTRAMUSCULAR; INTRAVENOUS
Status: COMPLETED
Start: 2019-01-01 | End: 2019-01-01

## 2019-01-01 RX ORDER — DEXTROSE MONOHYDRATE 25 G/50ML
25-50 INJECTION, SOLUTION INTRAVENOUS
Status: DISCONTINUED | OUTPATIENT
Start: 2019-01-01 | End: 2019-01-01 | Stop reason: HOSPADM

## 2019-01-01 RX ORDER — ACYCLOVIR 200 MG/1
0-1 CAPSULE ORAL
Status: DISCONTINUED | OUTPATIENT
Start: 2019-01-01 | End: 2019-01-01

## 2019-01-01 RX ORDER — AMOXICILLIN AND CLAVULANATE POTASSIUM 500; 125 MG/1; MG/1
1 TABLET, FILM COATED ORAL 2 TIMES DAILY
Qty: 20 TABLET | Refills: 0 | Status: SHIPPED | OUTPATIENT
Start: 2019-01-01 | End: 2019-01-01

## 2019-01-01 RX ORDER — POTASSIUM CHLORIDE 1500 MG/1
20-40 TABLET, EXTENDED RELEASE ORAL
Status: DISCONTINUED | OUTPATIENT
Start: 2019-01-01 | End: 2019-01-01 | Stop reason: HOSPADM

## 2019-01-01 RX ORDER — GINSENG 100 MG
CAPSULE ORAL 2 TIMES DAILY
Status: DISCONTINUED | OUTPATIENT
Start: 2019-01-01 | End: 2019-01-01 | Stop reason: HOSPADM

## 2019-01-01 RX ORDER — CLINDAMYCIN HCL 300 MG
300 CAPSULE ORAL 3 TIMES DAILY
Qty: 30 CAPSULE | Refills: 0 | Status: ON HOLD | OUTPATIENT
Start: 2019-01-01 | End: 2019-01-01

## 2019-01-01 RX ORDER — LOSARTAN POTASSIUM 50 MG/1
50 TABLET ORAL DAILY
Status: DISCONTINUED | OUTPATIENT
Start: 2019-01-01 | End: 2019-01-01

## 2019-01-01 RX ORDER — METOPROLOL SUCCINATE 50 MG/1
50 TABLET, EXTENDED RELEASE ORAL DAILY
Qty: 90 TABLET | Refills: 0 | Status: ON HOLD | OUTPATIENT
Start: 2019-01-01 | End: 2019-01-01

## 2019-01-01 RX ORDER — CARVEDILOL 25 MG/1
25 TABLET ORAL 2 TIMES DAILY WITH MEALS
Status: DISCONTINUED | OUTPATIENT
Start: 2019-01-01 | End: 2019-01-01 | Stop reason: HOSPADM

## 2019-01-01 RX ORDER — ASPIRIN 325 MG
325 TABLET ORAL ONCE
Status: COMPLETED | OUTPATIENT
Start: 2019-01-01 | End: 2019-01-01

## 2019-01-01 RX ORDER — SPIRONOLACTONE 25 MG/1
25 TABLET ORAL DAILY
Qty: 30 TABLET | Refills: 11 | Status: SHIPPED | OUTPATIENT
Start: 2019-01-01 | End: 2020-01-01

## 2019-01-01 RX ORDER — LISINOPRIL 20 MG/1
20 TABLET ORAL DAILY
Qty: 90 TABLET | Refills: 0 | Status: SHIPPED | OUTPATIENT
Start: 2019-01-01 | End: 2019-01-01

## 2019-01-01 RX ORDER — FUROSEMIDE 40 MG
40 TABLET ORAL DAILY
Qty: 30 TABLET | Refills: 0 | Status: SHIPPED | OUTPATIENT
Start: 2019-01-01 | End: 2019-01-01

## 2019-01-01 RX ORDER — REGADENOSON 0.08 MG/ML
0.4 INJECTION, SOLUTION INTRAVENOUS ONCE
Status: COMPLETED | OUTPATIENT
Start: 2019-01-01 | End: 2019-01-01

## 2019-01-01 RX ORDER — CEFAZOLIN SODIUM 2 G/100ML
2 INJECTION, SOLUTION INTRAVENOUS EVERY 12 HOURS
Status: DISCONTINUED | OUTPATIENT
Start: 2019-01-01 | End: 2019-01-01

## 2019-01-01 RX ORDER — AMOXICILLIN 250 MG
2 CAPSULE ORAL 2 TIMES DAILY PRN
Status: DISCONTINUED | OUTPATIENT
Start: 2019-01-01 | End: 2019-01-01 | Stop reason: HOSPADM

## 2019-01-01 RX ORDER — LISINOPRIL AND HYDROCHLOROTHIAZIDE 12.5; 2 MG/1; MG/1
1 TABLET ORAL AT BEDTIME
Status: ON HOLD | COMMUNITY
End: 2019-01-01

## 2019-01-01 RX ORDER — ONDANSETRON 2 MG/ML
4 INJECTION INTRAMUSCULAR; INTRAVENOUS ONCE
Status: COMPLETED | OUTPATIENT
Start: 2019-01-01 | End: 2019-01-01

## 2019-01-01 RX ORDER — LOSARTAN POTASSIUM 50 MG/1
50 TABLET ORAL DAILY
Qty: 30 TABLET | Refills: 0 | Status: CANCELLED | OUTPATIENT
Start: 2019-01-01

## 2019-01-01 RX ORDER — GADOBUTROL 604.72 MG/ML
5-65 INJECTION INTRAVENOUS ONCE
Status: COMPLETED | OUTPATIENT
Start: 2019-01-01 | End: 2019-01-01

## 2019-01-01 RX ORDER — ASPIRIN 81 MG/1
81 TABLET ORAL AT BEDTIME
Status: DISCONTINUED | OUTPATIENT
Start: 2019-01-01 | End: 2019-01-01 | Stop reason: HOSPADM

## 2019-01-01 RX ORDER — LOSARTAN POTASSIUM 25 MG/1
25 TABLET ORAL DAILY
Status: DISCONTINUED | OUTPATIENT
Start: 2019-01-01 | End: 2019-01-01

## 2019-01-01 RX ORDER — LISINOPRIL AND HYDROCHLOROTHIAZIDE 12.5; 2 MG/1; MG/1
1 TABLET ORAL DAILY
Qty: 90 TABLET | Refills: 0 | Status: SHIPPED | OUTPATIENT
Start: 2019-01-01 | End: 2019-01-01 | Stop reason: SINTOL

## 2019-01-01 RX ORDER — AMOXICILLIN 250 MG
1 CAPSULE ORAL 2 TIMES DAILY PRN
Status: DISCONTINUED | OUTPATIENT
Start: 2019-01-01 | End: 2019-01-01 | Stop reason: HOSPADM

## 2019-01-01 RX ORDER — EZETIMIBE 10 MG/1
10 TABLET ORAL AT BEDTIME
Status: DISCONTINUED | OUTPATIENT
Start: 2019-01-01 | End: 2019-01-01 | Stop reason: HOSPADM

## 2019-01-01 RX ORDER — FUROSEMIDE 10 MG/ML
40 INJECTION INTRAMUSCULAR; INTRAVENOUS ONCE
Status: COMPLETED | OUTPATIENT
Start: 2019-01-01 | End: 2019-01-01

## 2019-01-01 RX ORDER — ACYCLOVIR 200 MG/1
0-1 CAPSULE ORAL
Status: DISCONTINUED | OUTPATIENT
Start: 2019-01-01 | End: 2019-01-01 | Stop reason: HOSPADM

## 2019-01-01 RX ORDER — POTASSIUM CL/LIDO/0.9 % NACL 10MEQ/0.1L
10 INTRAVENOUS SOLUTION, PIGGYBACK (ML) INTRAVENOUS
Status: DISCONTINUED | OUTPATIENT
Start: 2019-01-01 | End: 2019-01-01 | Stop reason: HOSPADM

## 2019-01-01 RX ORDER — MAGNESIUM SULFATE HEPTAHYDRATE 40 MG/ML
2 INJECTION, SOLUTION INTRAVENOUS DAILY PRN
Status: DISCONTINUED | OUTPATIENT
Start: 2019-01-01 | End: 2019-01-01 | Stop reason: HOSPADM

## 2019-01-01 RX ORDER — HYDROMORPHONE HYDROCHLORIDE 1 MG/ML
0.5 INJECTION, SOLUTION INTRAMUSCULAR; INTRAVENOUS; SUBCUTANEOUS ONCE
Status: COMPLETED | OUTPATIENT
Start: 2019-01-01 | End: 2019-01-01

## 2019-01-01 RX ORDER — DEXTROSE MONOHYDRATE 25 G/50ML
25-50 INJECTION, SOLUTION INTRAVENOUS
Status: DISCONTINUED | OUTPATIENT
Start: 2019-01-01 | End: 2019-01-01

## 2019-01-01 RX ORDER — PIPERACILLIN SODIUM, TAZOBACTAM SODIUM 2; .25 G/10ML; G/10ML
2.25 INJECTION, POWDER, LYOPHILIZED, FOR SOLUTION INTRAVENOUS EVERY 6 HOURS
Status: DISCONTINUED | OUTPATIENT
Start: 2019-01-01 | End: 2019-01-01

## 2019-01-01 RX ORDER — LOSARTAN POTASSIUM 25 MG/1
25 TABLET ORAL ONCE
Status: COMPLETED | OUTPATIENT
Start: 2019-01-01 | End: 2019-01-01

## 2019-01-01 RX ORDER — FUROSEMIDE 40 MG
40 TABLET ORAL
Status: DISCONTINUED | OUTPATIENT
Start: 2019-01-01 | End: 2019-01-01

## 2019-01-01 RX ORDER — ONDANSETRON 2 MG/ML
4 INJECTION INTRAMUSCULAR; INTRAVENOUS EVERY 6 HOURS PRN
Status: DISCONTINUED | OUTPATIENT
Start: 2019-01-01 | End: 2019-01-01 | Stop reason: HOSPADM

## 2019-01-01 RX ORDER — CARVEDILOL 25 MG/1
25 TABLET ORAL 2 TIMES DAILY WITH MEALS
Qty: 60 TABLET | Refills: 0 | Status: SHIPPED | OUTPATIENT
Start: 2019-01-01 | End: 2019-01-01

## 2019-01-01 RX ORDER — LISINOPRIL 20 MG/1
20 TABLET ORAL DAILY
Qty: 90 TABLET | Refills: 0 | Status: CANCELLED | OUTPATIENT
Start: 2019-01-01

## 2019-01-01 RX ORDER — DIPHENHYDRAMINE HCL 25 MG
25 CAPSULE ORAL
Status: DISCONTINUED | OUTPATIENT
Start: 2019-01-01 | End: 2019-01-01 | Stop reason: HOSPADM

## 2019-01-01 RX ORDER — PROCHLORPERAZINE MALEATE 10 MG
10 TABLET ORAL EVERY 6 HOURS PRN
Status: DISCONTINUED | OUTPATIENT
Start: 2019-01-01 | End: 2019-01-01 | Stop reason: HOSPADM

## 2019-01-01 RX ORDER — FUROSEMIDE 40 MG
40 TABLET ORAL DAILY
Qty: 30 TABLET | Refills: 11 | Status: SHIPPED | OUTPATIENT
Start: 2019-01-01 | End: 2019-01-01 | Stop reason: DRUGHIGH

## 2019-01-01 RX ORDER — IOPAMIDOL 755 MG/ML
73 INJECTION, SOLUTION INTRAVASCULAR ONCE
Status: COMPLETED | OUTPATIENT
Start: 2019-01-01 | End: 2019-01-01

## 2019-01-01 RX ORDER — INSULIN GLARGINE 100 [IU]/ML
10 INJECTION, SOLUTION SUBCUTANEOUS DAILY
Qty: 9 ML | Refills: 0 | Status: SHIPPED | OUTPATIENT
Start: 2019-01-01 | End: 2019-01-01

## 2019-01-01 RX ORDER — HYDROMORPHONE HYDROCHLORIDE 1 MG/ML
INJECTION, SOLUTION INTRAMUSCULAR; INTRAVENOUS; SUBCUTANEOUS
Status: COMPLETED
Start: 2019-01-01 | End: 2019-01-01

## 2019-01-01 RX ORDER — METFORMIN HCL 500 MG
1000 TABLET, EXTENDED RELEASE 24 HR ORAL
Qty: 180 TABLET | Refills: 0 | Status: SHIPPED | OUTPATIENT
Start: 2019-01-01 | End: 2019-01-01

## 2019-01-01 RX ORDER — LOSARTAN POTASSIUM 50 MG/1
25 TABLET ORAL
Qty: 15 TABLET | Refills: 0 | COMMUNITY
Start: 2019-01-01 | End: 2019-01-01

## 2019-01-01 RX ORDER — CAFFEINE 200 MG
200 TABLET ORAL
Status: DISCONTINUED | OUTPATIENT
Start: 2019-01-01 | End: 2019-01-01 | Stop reason: HOSPADM

## 2019-01-01 RX ORDER — FUROSEMIDE 40 MG
40 TABLET ORAL DAILY
Status: DISCONTINUED | OUTPATIENT
Start: 2019-01-01 | End: 2019-01-01 | Stop reason: HOSPADM

## 2019-01-01 RX ORDER — LOSARTAN POTASSIUM 50 MG/1
25 TABLET ORAL
Qty: 15 TABLET | Refills: 11 | Status: SHIPPED | OUTPATIENT
Start: 2019-01-01

## 2019-01-01 RX ORDER — AMINOPHYLLINE 25 MG/ML
100 INJECTION, SOLUTION INTRAVENOUS ONCE
Status: DISCONTINUED | OUTPATIENT
Start: 2019-01-01 | End: 2019-01-01 | Stop reason: HOSPADM

## 2019-01-01 RX ADMIN — INSULIN GLARGINE 40 UNITS: 100 INJECTION, SOLUTION SUBCUTANEOUS at 21:28

## 2019-01-01 RX ADMIN — ONDANSETRON 4 MG: 2 INJECTION INTRAMUSCULAR; INTRAVENOUS at 19:54

## 2019-01-01 RX ADMIN — BACITRACIN: 500 OINTMENT TOPICAL at 21:01

## 2019-01-01 RX ADMIN — B-COMPLEX W/ C & FOLIC ACID TAB 1 TABLET: TAB at 21:28

## 2019-01-01 RX ADMIN — CARVEDILOL 18.75 MG: 6.25 TABLET, FILM COATED ORAL at 09:57

## 2019-01-01 RX ADMIN — CARVEDILOL 25 MG: 25 TABLET, FILM COATED ORAL at 19:08

## 2019-01-01 RX ADMIN — INSULIN ASPART 2 UNITS: 100 INJECTION, SOLUTION INTRAVENOUS; SUBCUTANEOUS at 23:22

## 2019-01-01 RX ADMIN — CEFAZOLIN SODIUM 2 G: 2 INJECTION, SOLUTION INTRAVENOUS at 21:28

## 2019-01-01 RX ADMIN — MELATONIN 2000 UNITS: at 21:16

## 2019-01-01 RX ADMIN — FUROSEMIDE 40 MG: 10 INJECTION, SOLUTION INTRAVENOUS at 05:56

## 2019-01-01 RX ADMIN — INSULIN GLARGINE 20 UNITS: 100 INJECTION, SOLUTION SUBCUTANEOUS at 07:59

## 2019-01-01 RX ADMIN — PIPERACILLIN SODIUM,TAZOBACTAM SODIUM 3.38 G: 3; .375 INJECTION, POWDER, FOR SOLUTION INTRAVENOUS at 01:46

## 2019-01-01 RX ADMIN — REGADENOSON 0.4 MG: 0.08 INJECTION, SOLUTION INTRAVENOUS at 12:49

## 2019-01-01 RX ADMIN — OXYCODONE HYDROCHLORIDE 5 MG: 5 TABLET ORAL at 10:34

## 2019-01-01 RX ADMIN — OXYCODONE HYDROCHLORIDE 5 MG: 5 TABLET ORAL at 16:19

## 2019-01-01 RX ADMIN — INSULIN ASPART 1 UNITS: 100 INJECTION, SOLUTION INTRAVENOUS; SUBCUTANEOUS at 19:09

## 2019-01-01 RX ADMIN — INSULIN GLARGINE 40 UNITS: 100 INJECTION, SOLUTION SUBCUTANEOUS at 22:56

## 2019-01-01 RX ADMIN — CARVEDILOL 12.5 MG: 12.5 TABLET, FILM COATED ORAL at 18:02

## 2019-01-01 RX ADMIN — CARVEDILOL 18.75 MG: 6.25 TABLET, FILM COATED ORAL at 18:00

## 2019-01-01 RX ADMIN — ACETAMINOPHEN 650 MG: 325 TABLET, FILM COATED ORAL at 08:56

## 2019-01-01 RX ADMIN — INSULIN GLARGINE 20 UNITS: 100 INJECTION, SOLUTION SUBCUTANEOUS at 10:01

## 2019-01-01 RX ADMIN — INSULIN HUMAN 10 UNITS: 100 INJECTION, SUSPENSION SUBCUTANEOUS at 14:20

## 2019-01-01 RX ADMIN — INSULIN ASPART 2 UNITS: 100 INJECTION, SOLUTION INTRAVENOUS; SUBCUTANEOUS at 12:13

## 2019-01-01 RX ADMIN — INSULIN GLARGINE 40 UNITS: 100 INJECTION, SOLUTION SUBCUTANEOUS at 21:50

## 2019-01-01 RX ADMIN — PIPERACILLIN SODIUM,TAZOBACTAM SODIUM 3.38 G: 3; .375 INJECTION, POWDER, FOR SOLUTION INTRAVENOUS at 14:20

## 2019-01-01 RX ADMIN — BACITRACIN: 500 OINTMENT TOPICAL at 09:59

## 2019-01-01 RX ADMIN — ACETAMINOPHEN 650 MG: 325 TABLET, FILM COATED ORAL at 07:58

## 2019-01-01 RX ADMIN — VANCOMYCIN HYDROCHLORIDE 1750 MG: 5 INJECTION, POWDER, LYOPHILIZED, FOR SOLUTION INTRAVENOUS at 21:02

## 2019-01-01 RX ADMIN — B-COMPLEX W/ C & FOLIC ACID TAB 1 TABLET: TAB at 21:16

## 2019-01-01 RX ADMIN — INSULIN ASPART 1 UNITS: 100 INJECTION, SOLUTION INTRAVENOUS; SUBCUTANEOUS at 13:06

## 2019-01-01 RX ADMIN — FUROSEMIDE 40 MG: 40 TABLET ORAL at 09:58

## 2019-01-01 RX ADMIN — OXYCODONE HYDROCHLORIDE 5 MG: 5 TABLET ORAL at 19:45

## 2019-01-01 RX ADMIN — B-COMPLEX W/ C & FOLIC ACID TAB 1 TABLET: TAB at 21:50

## 2019-01-01 RX ADMIN — MELATONIN 2000 UNITS: at 21:50

## 2019-01-01 RX ADMIN — BACITRACIN: 500 OINTMENT TOPICAL at 13:02

## 2019-01-01 RX ADMIN — ASPIRIN 81 MG: 81 TABLET, COATED ORAL at 21:16

## 2019-01-01 RX ADMIN — PIPERACILLIN SODIUM,TAZOBACTAM SODIUM 3.38 G: 3; .375 INJECTION, POWDER, FOR SOLUTION INTRAVENOUS at 01:41

## 2019-01-01 RX ADMIN — FUROSEMIDE 40 MG: 40 TABLET ORAL at 16:12

## 2019-01-01 RX ADMIN — BACITRACIN: 500 OINTMENT TOPICAL at 12:01

## 2019-01-01 RX ADMIN — CEFAZOLIN 1 G: 1 INJECTION, POWDER, FOR SOLUTION INTRAMUSCULAR; INTRAVENOUS at 10:15

## 2019-01-01 RX ADMIN — INSULIN GLARGINE 40 UNITS: 100 INJECTION, SOLUTION SUBCUTANEOUS at 21:30

## 2019-01-01 RX ADMIN — CEFAZOLIN SODIUM 2 G: 2 INJECTION, SOLUTION INTRAVENOUS at 01:40

## 2019-01-01 RX ADMIN — FUROSEMIDE 40 MG: 10 INJECTION, SOLUTION INTRAVENOUS at 16:03

## 2019-01-01 RX ADMIN — CYANOCOBALAMIN 1000 MCG: 1000 INJECTION INTRAMUSCULAR; SUBCUTANEOUS at 21:10

## 2019-01-01 RX ADMIN — CARVEDILOL 12.5 MG: 12.5 TABLET, FILM COATED ORAL at 10:40

## 2019-01-01 RX ADMIN — PIPERACILLIN SODIUM,TAZOBACTAM SODIUM 3.38 G: 3; .375 INJECTION, POWDER, FOR SOLUTION INTRAVENOUS at 19:20

## 2019-01-01 RX ADMIN — CEFAZOLIN SODIUM 2 G: 2 INJECTION, SOLUTION INTRAVENOUS at 17:56

## 2019-01-01 RX ADMIN — PIPERACILLIN SODIUM,TAZOBACTAM SODIUM 3.38 G: 3; .375 INJECTION, POWDER, FOR SOLUTION INTRAVENOUS at 07:10

## 2019-01-01 RX ADMIN — BACITRACIN: 500 OINTMENT TOPICAL at 21:05

## 2019-01-01 RX ADMIN — SPIRONOLACTONE 25 MG: 25 TABLET ORAL at 13:22

## 2019-01-01 RX ADMIN — PIPERACILLIN SODIUM AND TAZOBACTAM SODIUM 4.5 G: 4; .5 INJECTION, POWDER, LYOPHILIZED, FOR SOLUTION INTRAVENOUS at 20:07

## 2019-01-01 RX ADMIN — OXYCODONE HYDROCHLORIDE 5 MG: 5 TABLET ORAL at 01:45

## 2019-01-01 RX ADMIN — INSULIN GLARGINE 40 UNITS: 100 INJECTION, SOLUTION SUBCUTANEOUS at 21:05

## 2019-01-01 RX ADMIN — SPIRONOLACTONE 25 MG: 25 TABLET ORAL at 09:57

## 2019-01-01 RX ADMIN — FUROSEMIDE 40 MG: 10 INJECTION, SOLUTION INTRAVENOUS at 08:26

## 2019-01-01 RX ADMIN — HYDROMORPHONE HYDROCHLORIDE 0.5 MG: 1 INJECTION, SOLUTION INTRAMUSCULAR; INTRAVENOUS; SUBCUTANEOUS at 20:44

## 2019-01-01 RX ADMIN — INSULIN GLARGINE 40 UNITS: 100 INJECTION, SOLUTION SUBCUTANEOUS at 21:10

## 2019-01-01 RX ADMIN — OXYCODONE HYDROCHLORIDE 5 MG: 5 TABLET ORAL at 07:58

## 2019-01-01 RX ADMIN — ACETAMINOPHEN 650 MG: 325 TABLET, FILM COATED ORAL at 20:35

## 2019-01-01 RX ADMIN — CYANOCOBALAMIN 1000 MCG: 1000 INJECTION INTRAMUSCULAR; SUBCUTANEOUS at 21:58

## 2019-01-01 RX ADMIN — DAPTOMYCIN 600 MG: 500 INJECTION, POWDER, LYOPHILIZED, FOR SOLUTION INTRAVENOUS at 18:39

## 2019-01-01 RX ADMIN — BACITRACIN: 500 OINTMENT TOPICAL at 20:30

## 2019-01-01 RX ADMIN — INSULIN GLARGINE 40 UNITS: 100 INJECTION, SOLUTION SUBCUTANEOUS at 21:17

## 2019-01-01 RX ADMIN — EZETIMIBE 10 MG: 10 TABLET ORAL at 21:28

## 2019-01-01 RX ADMIN — LOSARTAN POTASSIUM 50 MG: 50 TABLET ORAL at 09:49

## 2019-01-01 RX ADMIN — INSULIN ASPART 2 UNITS: 100 INJECTION, SOLUTION INTRAVENOUS; SUBCUTANEOUS at 07:27

## 2019-01-01 RX ADMIN — ASPIRIN 81 MG: 81 TABLET, COATED ORAL at 21:10

## 2019-01-01 RX ADMIN — INSULIN ASPART 1 UNITS: 100 INJECTION, SOLUTION INTRAVENOUS; SUBCUTANEOUS at 18:02

## 2019-01-01 RX ADMIN — EZETIMIBE 10 MG: 10 TABLET ORAL at 21:01

## 2019-01-01 RX ADMIN — SODIUM CHLORIDE 2000 ML: 9 INJECTION, SOLUTION INTRAVENOUS at 19:54

## 2019-01-01 RX ADMIN — LOSARTAN POTASSIUM 50 MG: 50 TABLET ORAL at 10:41

## 2019-01-01 RX ADMIN — CARVEDILOL 12.5 MG: 12.5 TABLET, FILM COATED ORAL at 08:25

## 2019-01-01 RX ADMIN — VANCOMYCIN HYDROCHLORIDE 1750 MG: 5 INJECTION, POWDER, LYOPHILIZED, FOR SOLUTION INTRAVENOUS at 14:48

## 2019-01-01 RX ADMIN — ASPIRIN 325 MG ORAL TABLET 325 MG: 325 PILL ORAL at 21:30

## 2019-01-01 RX ADMIN — HUMAN ALBUMIN MICROSPHERES AND PERFLUTREN 9 ML: 10; .22 INJECTION, SOLUTION INTRAVENOUS at 09:45

## 2019-01-01 RX ADMIN — HYDROMORPHONE HYDROCHLORIDE 0.3 MG: 1 INJECTION, SOLUTION INTRAMUSCULAR; INTRAVENOUS; SUBCUTANEOUS at 09:37

## 2019-01-01 RX ADMIN — REGADENOSON 0.4 MG: 0.08 INJECTION, SOLUTION INTRAVENOUS at 15:51

## 2019-01-01 RX ADMIN — MELATONIN 2000 UNITS: at 21:28

## 2019-01-01 RX ADMIN — OXYCODONE HYDROCHLORIDE 5 MG: 5 TABLET ORAL at 19:44

## 2019-01-01 RX ADMIN — FUROSEMIDE 40 MG: 40 TABLET ORAL at 09:49

## 2019-01-01 RX ADMIN — ASPIRIN 81 MG: 81 TABLET, COATED ORAL at 21:28

## 2019-01-01 RX ADMIN — LOSARTAN POTASSIUM 25 MG: 25 TABLET ORAL at 13:01

## 2019-01-01 RX ADMIN — SPIRONOLACTONE 25 MG: 25 TABLET ORAL at 09:49

## 2019-01-01 RX ADMIN — BACITRACIN: 500 OINTMENT TOPICAL at 20:18

## 2019-01-01 RX ADMIN — ASPIRIN 81 MG: 81 TABLET, COATED ORAL at 21:50

## 2019-01-01 RX ADMIN — CARVEDILOL 12.5 MG: 12.5 TABLET, FILM COATED ORAL at 21:20

## 2019-01-01 RX ADMIN — POLYETHYLENE GLYCOL 3350 17 G: 17 POWDER, FOR SOLUTION ORAL at 15:40

## 2019-01-01 RX ADMIN — FUROSEMIDE 40 MG: 10 INJECTION, SOLUTION INTRAVENOUS at 16:44

## 2019-01-01 RX ADMIN — IOPAMIDOL 73 ML: 755 INJECTION, SOLUTION INTRAVENOUS at 20:06

## 2019-01-01 RX ADMIN — LISINOPRIL AND HYDROCHLOROTHIAZIDE 1 TABLET: 12.5; 2 TABLET ORAL at 23:21

## 2019-01-01 RX ADMIN — CARVEDILOL 12.5 MG: 12.5 TABLET, FILM COATED ORAL at 12:45

## 2019-01-01 RX ADMIN — INSULIN ASPART 1 UNITS: 100 INJECTION, SOLUTION INTRAVENOUS; SUBCUTANEOUS at 18:00

## 2019-01-01 RX ADMIN — INSULIN ASPART 1 UNITS: 100 INJECTION, SOLUTION INTRAVENOUS; SUBCUTANEOUS at 18:10

## 2019-01-01 RX ADMIN — SODIUM CHLORIDE 96 ML: 9 INJECTION, SOLUTION INTRAVENOUS at 20:07

## 2019-01-01 RX ADMIN — INSULIN ASPART 1 UNITS: 100 INJECTION, SOLUTION INTRAVENOUS; SUBCUTANEOUS at 12:48

## 2019-01-01 RX ADMIN — BACITRACIN: 500 OINTMENT TOPICAL at 12:49

## 2019-01-01 RX ADMIN — LIDOCAINE: 40 CREAM TOPICAL at 20:08

## 2019-01-01 RX ADMIN — FUROSEMIDE 40 MG: 40 TABLET ORAL at 17:11

## 2019-01-01 RX ADMIN — FUROSEMIDE 40 MG: 10 INJECTION, SOLUTION INTRAVENOUS at 21:30

## 2019-01-01 RX ADMIN — LOSARTAN POTASSIUM 25 MG: 25 TABLET ORAL at 08:25

## 2019-01-01 RX ADMIN — B-COMPLEX W/ C & FOLIC ACID TAB 1 TABLET: TAB at 21:10

## 2019-01-01 RX ADMIN — LOSARTAN POTASSIUM 50 MG: 50 TABLET ORAL at 09:57

## 2019-01-01 RX ADMIN — HUMAN INSULIN 8 UNITS: 100 INJECTION, SOLUTION SUBCUTANEOUS at 21:52

## 2019-01-01 RX ADMIN — GADOBUTROL 22 ML: 604.72 INJECTION INTRAVENOUS at 16:46

## 2019-01-01 RX ADMIN — HYDROMORPHONE HYDROCHLORIDE 0.5 MG: 1 INJECTION, SOLUTION INTRAMUSCULAR; INTRAVENOUS; SUBCUTANEOUS at 19:54

## 2019-01-01 RX ADMIN — LOSARTAN POTASSIUM 25 MG: 25 TABLET ORAL at 12:45

## 2019-01-01 RX ADMIN — VANCOMYCIN HYDROCHLORIDE 1750 MG: 5 INJECTION, POWDER, LYOPHILIZED, FOR SOLUTION INTRAVENOUS at 08:24

## 2019-01-01 RX ADMIN — ACETAMINOPHEN 650 MG: 325 TABLET, FILM COATED ORAL at 14:19

## 2019-01-01 RX ADMIN — INSULIN GLARGINE 40 UNITS: 100 INJECTION, SOLUTION SUBCUTANEOUS at 21:08

## 2019-01-01 RX ADMIN — FUROSEMIDE 40 MG: 10 INJECTION, SOLUTION INTRAVENOUS at 10:41

## 2019-01-01 RX ADMIN — CEFAZOLIN SODIUM 2 G: 2 INJECTION, SOLUTION INTRAVENOUS at 09:25

## 2019-01-01 RX ADMIN — INSULIN GLARGINE 40 UNITS: 100 INJECTION, SOLUTION SUBCUTANEOUS at 21:31

## 2019-01-01 RX ADMIN — TETROFOSMIN 30 MCI.: 1.38 INJECTION, POWDER, LYOPHILIZED, FOR SOLUTION INTRAVENOUS at 12:57

## 2019-01-01 RX ADMIN — OXYCODONE HYDROCHLORIDE 5 MG: 5 TABLET ORAL at 08:56

## 2019-01-01 RX ADMIN — CEFAZOLIN SODIUM 2 G: 2 INJECTION, SOLUTION INTRAVENOUS at 09:09

## 2019-01-01 RX ADMIN — SENNOSIDES AND DOCUSATE SODIUM 1 TABLET: 8.6; 5 TABLET ORAL at 18:39

## 2019-01-01 RX ADMIN — HYDROMORPHONE HYDROCHLORIDE 0.3 MG: 1 INJECTION, SOLUTION INTRAMUSCULAR; INTRAVENOUS; SUBCUTANEOUS at 06:38

## 2019-01-01 RX ADMIN — INSULIN GLARGINE 40 UNITS: 100 INJECTION, SOLUTION SUBCUTANEOUS at 21:16

## 2019-01-01 RX ADMIN — ACETAMINOPHEN 650 MG: 325 TABLET, FILM COATED ORAL at 05:09

## 2019-01-01 RX ADMIN — SENNOSIDES AND DOCUSATE SODIUM 2 TABLET: 8.6; 5 TABLET ORAL at 06:47

## 2019-01-01 RX ADMIN — INSULIN GLARGINE 20 UNITS: 100 INJECTION, SOLUTION SUBCUTANEOUS at 08:55

## 2019-01-01 RX ADMIN — OXYCODONE HYDROCHLORIDE 5 MG: 5 TABLET ORAL at 19:59

## 2019-01-01 RX ADMIN — ASPIRIN 81 MG: 81 TABLET, COATED ORAL at 23:21

## 2019-01-01 RX ADMIN — PIPERACILLIN SODIUM,TAZOBACTAM SODIUM 3.38 G: 3; .375 INJECTION, POWDER, FOR SOLUTION INTRAVENOUS at 08:56

## 2019-01-01 RX ADMIN — CARVEDILOL 25 MG: 25 TABLET, FILM COATED ORAL at 09:49

## 2019-01-01 RX ADMIN — TETROFOSMIN 10.1 MCI.: 1.38 INJECTION, POWDER, LYOPHILIZED, FOR SOLUTION INTRAVENOUS at 10:52

## 2019-01-01 RX ADMIN — MELATONIN 2000 UNITS: at 21:10

## 2019-01-01 SDOH — HEALTH STABILITY: MENTAL HEALTH: HOW OFTEN DO YOU HAVE A DRINK CONTAINING ALCOHOL?: NEVER

## 2019-01-01 ASSESSMENT — ACTIVITIES OF DAILY LIVING (ADL)
ADLS_ACUITY_SCORE: 15
ADLS_ACUITY_SCORE: 13
ADLS_ACUITY_SCORE: 15
RETIRED_EATING: 0-->INDEPENDENT
ADLS_ACUITY_SCORE: 13
ADLS_ACUITY_SCORE: 15
ADLS_ACUITY_SCORE: 13
SWALLOWING: 0-->SWALLOWS FOODS/LIQUIDS WITHOUT DIFFICULTY
BATHING: 0-->INDEPENDENT
ADLS_ACUITY_SCORE: 13
ADLS_ACUITY_SCORE: 13
ADLS_ACUITY_SCORE: 15
ADLS_ACUITY_SCORE: 15
ADLS_ACUITY_SCORE: 13
ADLS_ACUITY_SCORE: 15
ADLS_ACUITY_SCORE: 15
ADLS_ACUITY_SCORE: 13
ADLS_ACUITY_SCORE: 15
DRESS: 0-->INDEPENDENT
DEPENDENT_IADLS:: INDEPENDENT
ADLS_ACUITY_SCORE: 13
ADLS_ACUITY_SCORE: 15
RETIRED_COMMUNICATION: 0-->UNDERSTANDS/COMMUNICATES WITHOUT DIFFICULTY
ADLS_ACUITY_SCORE: 15
ADLS_ACUITY_SCORE: 15
ADLS_ACUITY_SCORE: 13
ADLS_ACUITY_SCORE: 15
ADLS_ACUITY_SCORE: 13
ADLS_ACUITY_SCORE: 15
ADLS_ACUITY_SCORE: 15
ADLS_ACUITY_SCORE: 13
ADLS_ACUITY_SCORE: 15
TOILETING: 0-->INDEPENDENT
ADLS_ACUITY_SCORE: 15
ADLS_ACUITY_SCORE: 13
ADLS_ACUITY_SCORE: 13
ADLS_ACUITY_SCORE: 15
ADLS_ACUITY_SCORE: 15
ADLS_ACUITY_SCORE: 13
ADLS_ACUITY_SCORE: 13

## 2019-01-01 ASSESSMENT — ENCOUNTER SYMPTOMS
SHORTNESS OF BREATH: 1
COUGH: 1
FEVER: 1
WOUND: 1
ABDOMINAL PAIN: 0
FEVER: 1
NAUSEA: 1
APPETITE CHANGE: 1
VOMITING: 0
ABDOMINAL PAIN: 1

## 2019-01-01 ASSESSMENT — MIFFLIN-ST. JEOR
SCORE: 1753.86
SCORE: 1752.61
SCORE: 1739.34
SCORE: 1762.93
SCORE: 1762.93
SCORE: 1734.81
SCORE: 1772
SCORE: 1732.09
SCORE: 1732.09
SCORE: 1762.93
SCORE: 1704.09
SCORE: 1779.26
SCORE: 1754.31
SCORE: 1730.27
SCORE: 1731.18
SCORE: 1749.32
SCORE: 1762.02
SCORE: 1772.91
SCORE: 1737.08
SCORE: 1777.45
SCORE: 1756.58

## 2019-01-01 ASSESSMENT — PAIN SCALES - GENERAL: PAINLEVEL: MODERATE PAIN (4)

## 2019-03-01 NOTE — Clinical Note
"ALFONSOI - I'm having him see you, perhaps you can talk him into titrating up on his insulin and get him back to checking sugars. He's resistant to meds - came to me from a holistic doc that knows me. They were working on his diabetes with \"fasting\" and \"probiotics.\"         :> /   "

## 2019-03-01 NOTE — PROGRESS NOTES
SUBJECTIVE:   Jose Reid is a 58 year old male who presents to clinic today for the following health issues:    Diabetes Follow-up    Patient is checking blood sugars: once daily.  Results are as follows:         am - 250    Diabetic concerns: None and blood sugar frequently over 280-320s. Hasn't been checking in the afternoons.      Symptoms of hypoglycemia (low blood sugar): none     Paresthesias (numbness or burning in feet) or sores: Yes numbness     Date of last diabetic eye exam: Due    7 years with diabetes / never controlled - never on insulin. He hasn't wanted to be on it - chart review shows endocrinology was seeing him and had him on a number of medications, oral, byetta, etc - he did okay but I see no A1C below 11.0 at any time.    He says he feels okay- despite his poorly controlled diabetes.     BP Readings from Last 2 Encounters:   No data found for BP     Hemoglobin A1C (%)   Date Value   07/03/2012 12.3 (H)   06/08/2011 13.5 (H)     LDL Cholesterol Direct (mg/dl)   Date Value   07/03/2012 82   06/08/2011 72       Diabetes Management Resources  Hypertension Follow-up      Outpatient blood pressures are being checked at home.  Results are 180/118 in am.    Low Salt Diet: no added salt      Amount of exercise or physical activity: 6-7 days/week for an average of 45-60 minutes    Problems taking medications regularly: No    Medication side effects: not applicable    Diet: low salt    Has had a hard time tolerating medication - reports that numbers run 180/100s. Had been in the 150s / 100.     Problem list and histories reviewed & adjusted, as indicated.  Additional history: as documented    Labs reviewed in EPIC    Reviewed and updated as needed this visit by clinical staff       Reviewed and updated as needed this visit by Provider         ROS:  Constitutional, HEENT, cardiovascular, pulmonary, gi and gu systems are negative, except as otherwise noted.    OBJECTIVE:     BP (!) 180/104 (BP  "Location: Left arm, Patient Position: Sitting, Cuff Size: Adult Regular)   Pulse 90   Temp 97.8  F (36.6  C) (Oral)   Ht 1.797 m (5' 10.75\")   Wt 86.1 kg (189 lb 12.8 oz)   BMI 26.66 kg/m    Body mass index is 26.66 kg/m .  GENERAL: healthy, alert and no distress  RESP: lungs clear to auscultation - no rales, rhonchi or wheezes  CV: regular rate and rhythm, normal S1 S2, no S3 or S4, no murmur, click or rub, no peripheral edema and peripheral pulses strong      ASSESSMENT/PLAN:       (E11.9) Type 2 diabetes mellitus without complication, unspecified whether long term insulin use (H)  (primary encounter diagnosis)  Comment:   Plan: CREATININE, HEMOGLOBIN A1C, Lipid panel reflex         to direct LDL Fasting, Albumin Random Urine         Quantitative with Creat Ratio, TSH WITH FREE T4        REFLEX, Insulin level, metFORMIN         (GLUCOPHAGE-XR) 500 MG 24 hr tablet, insulin         glargine (BASAGLAR KWIKPEN) 100 UNIT/ML pen,         ENDOCRINOLOGY ADULT REFERRAL          Lab Results   Component Value Date    A1C >15.0 03/01/2019      Not controlled. He is a bit resistant to medications - wants to do things naturally as possible he says.     I did convince him to start metformin and a basal insulin regimen. I want to check his insulin level, if low, he may need pre-meal.    I'm having him see [facility] next week.     Referred to endocrine to evaluate for secondary causes of type 2  Diabetes because he has such an intense presentation with his glucose ranges and his BP.     (I10) Hypertension goal BP (blood pressure) < 140/90  Comment:   Plan: lisinopril (PRINIVIL/ZESTRIL) 20 MG tablet        Elevated. He agrees to medication.     Unable to start a Statin.     (Z11.59) Need for hepatitis C screening test  Comment:   Plan: Hepatitis C Screen Reflex to HCV RNA Quant and         Genotype          (Z13.5) Screening for diabetic retinopathy  Comment:   Plan: Reminder     (Z12.11) Screen for colon cancer  Comment: "   Plan: Did not have time to discuss     (Z11.4) Screening for HIV (human immunodeficiency virus)  Comment:   Plan: HIV Screening        Checking     Follow up in 2 weeks with [facility], with me in 3 months.     Spent 25 minutes with Jose. Over 50% of our time was counseling and education    REINIER PINO PA-C  [facility]

## 2019-05-02 NOTE — PROGRESS NOTES
"  SUBJECTIVE:   Jose Reid is a 59 year old male who presents to clinic today for the following   health issues:    Diabetes Follow-up    Patient is checking blood sugars: once daily.  Results are as follows:         am - 157-334    Diabetic concerns: None     Symptoms of hypoglycemia (low blood sugar): none     Paresthesias (numbness or burning in feet) or sores: No     Date of last diabetic eye exam: patient says a couple of years ago    Diabetes Management Resources    Hyperlipidemia Follow-Up      Rate your low fat/cholesterol diet?: good    Taking statin?  No, patient cannot take due to side effects    Other lipid medications/supplements?:  none    Hypertension Follow-up    Patient says he thinks the Lisinopril is not helping as much.     Left leg neuropathy - not a new issue. Ongoing burning, numbness and \"can't feel.\" Reports this is from an old nerve injury.      Outpatient blood pressures are being checked at home.  Results are 160-184/101-124.    Low Salt Diet: low salt    BP Readings from Last 2 Encounters:   03/01/19 (!) 180/104     Hemoglobin A1C (%)   Date Value   05/02/2019 14.4 (H)   03/01/2019 >15.0 (H)     LDL Cholesterol Calculated (mg/dL)   Date Value   03/01/2019 190 (H)     LDL Cholesterol Direct (mg/dl)   Date Value   07/03/2012 82   06/08/2011 72       Amount of exercise or physical activity: patient walks every morning     Problems taking medications regularly: No    Medication side effects: none    Diet: Low carb    Additional history: as documented    Reviewed  and updated as needed this visit by clinical staff  Tobacco  Allergies  Meds  Med Hx  Surg Hx  Fam Hx  Soc Hx        Reviewed and updated as needed this visit by Provider         Labs reviewed in EPIC    ROS:  Constitutional, HEENT, cardiovascular, pulmonary, gi and gu systems are negative, except as otherwise noted.    OBJECTIVE:     /90 (BP Location: Right arm, Patient Position: Chair, Cuff Size: Adult Regular)   " "Pulse 100   Temp 99  F (37.2  C) (Oral)   Ht 1.797 m (5' 10.75\")   Wt 91.4 kg (201 lb 9.6 oz)   BMI 28.32 kg/m    Body mass index is 28.32 kg/m .  GENERAL: healthy, alert and no distress    ASSESSMENT/PLAN:       1. Type 2 diabetes mellitus with other circulatory complication, unspecified whether long term insulin use (H)  Lab Results   Component Value Date    A1C 14.4 05/02/2019    A1C >15.0 03/01/2019      This is a 2 month look back - some improvement. He's increasing insulin slowly - he wants to increase more which I agree with. Recommend to increase metformin to 2000 mg as well. Keep me posted about side effects.   - Hemoglobin A1c    2. Neuropathy  I think that this could be diabetic. He considers that possibility as well - he wants to see a neurologist. Referral is given.  - NEUROLOGY ADULT REFERRAL    3. Hypertension goal BP (blood pressure) < 140/90    - lisinopril-hydrochlorothiazide (PRINZIDE/ZESTORETIC) 20-12.5 MG tablet; Take 1 tablet by mouth daily  Dispense: 90 tablet; Refill: 0    4. Type 2 diabetes mellitus without complication, unspecified whether long term insulin use (H)  Elevated - reviewed options. Will add / change to a Lisinopril/HCTZ combo. Recheck here at pharmacy as needed.  - insulin glargine (LANTUS SOLOSTAR PEN) 100 UNIT/ML pen; Inject 50 Units Subcutaneous daily  Dispense: 50 mL; Refill: 1  - metFORMIN (GLUCOPHAGE-XR) 500 MG 24 hr tablet; Take 4 tablets (2,000 mg) by mouth daily (with dinner)  Dispense: 360 tablet; Refill: 1    5. Screening for diabetic retinopathy  Reminder     6. Screen for colon cancer  Reminder given.     REINIER PINO PA-C  [facility]        "

## 2019-05-02 NOTE — PATIENT INSTRUCTIONS
1. Goal sugars 120s before meals and 140-180 after meals - slowly increase insulin up to 50 units if you want.  2. Stop the Lisinopril 20 mg, add back Lisinopril/HCTZ-20/12.5  3. Lower cost acupuncture [facility]

## 2019-05-23 NOTE — TELEPHONE ENCOUNTER
"Routing [employer] message to PCP to please advise. Would you like patient to continue on lisinopril-hydrochlorothiazide?    Patient last saw you in clinic on 5/2/19 and blood pressure medication was changed at that visit. Note pasted below.     Tayler Cano RN      \"3. Hypertension goal BP (blood pressure) < 140/90     - lisinopril-hydrochlorothiazide (PRINZIDE/ZESTORETIC) 20-12.5 MG tablet; Take 1 tablet by mouth daily  Dispense: 90 tablet; Refill: 0    1. Goal sugars 120s before meals and 140-180 after meals - slowly increase insulin up to 50 units if you want.  2. Stop the Lisinopril 20 mg, add back Lisinopril/HCTZ-20/12.5  3. Lower cost acupuncture [facility]\"  "

## 2019-05-24 NOTE — TELEPHONE ENCOUNTER
[employer] message sent.  Reviewed Khadar's plan via [employer] to increase Lisinopril to 40 mg and stop the hydrochlorothiazide.     RX for Lisinopril 40 mg sent.  Discontinued lisinopril-hydrochlorothiazide.     Patient to update us next week or sooner if needed.    Bear Chung RN

## 2019-07-16 NOTE — PROGRESS NOTES
There are no exam notes on file for this visit.  Nursing note reviewed with patient.  Accurracy and completeness verified.    Chief Complaint   Patient presents with     Urgent Care     Infection     sores on back and scratched them and got infected. first noticed this last tuesday but didnt notice until monday morning.        HPI:  Jose Reid is a 59 year old male who has a infection on his back  Last Tuesday (7 days first noticed symptoms)  progessive pain, malaise Worsening and now fever      ROS: As per HPI.  Ears/Nose/Throat: No sores or sore throat  Lymph: no swollen nodes    Allergies, reviewed:     Allergies   Allergen Reactions     Exenatide Rash     Hmg-Coa-R Inhibitors Muscle Pain (Myalgia) and Other (See Comments)     Extreme Fatigue       Amlodipine      Other reaction(s): Dizziness  Dizzy        Med list prior to vist:    Current Outpatient Medications on File Prior to Visit:  aspirin 81 MG EC tablet Take 1 tablet (81 mg) by mouth daily   insulin glargine (LANTUS SOLOSTAR PEN) 100 UNIT/ML pen Inject 50 Units Subcutaneous daily   insulin pen needle (31G X 5 MM) 31G X 5 MM miscellaneous Use 1 pen needles daily or as directed.   lisinopril (PRINIVIL/ZESTRIL) 40 MG tablet Take 1 tablet (40 mg) by mouth daily   metFORMIN (GLUCOPHAGE-XR) 500 MG 24 hr tablet Take 4 tablets (2,000 mg) by mouth daily (with dinner)     No current facility-administered medications on file prior to visit.   Medications reviewed and updated    Objective:  BP (!) 176/112   Pulse 125   Temp 100.2  F (37.9  C) (Oral)   SpO2 96%   General Appearance: Pleasant, alert, WN/WD in no acute respiratory distress.  Eye Exam: Normal external eye, conjunctiva, lids.  OroPharynx Exam: Dental hygiene adequate. Normal buccal mucosa. Normal pharynx.  Neck Exam: Supple, no masses or enlarged, tender nodes.  Cardiovascular Exam: No edema or vascular insufficiency.  Skin: 6 x 9cm area of erythema surrounding a fluctuant patch 2x3 cm, signs of  previous drainage  Neurologic Exam: Nonfocal, no tremor. Normal gait.  Psychiatric Exam: Alert - appropriate, normal affect    ASSESSMENT/PLAN:    ICD-10-CM    1. Cellulitis and abscess of trunk L03.319 clindamycin (CLEOCIN) 300 MG capsule    L02.219 DRAIN SKIN ABSCESS SIMPLE/SINGLE   2. Type 2 diabetes, HbA1c goal < 7% (H) E11.9      Cellulitis with fever and systemic symptoms requiring antibiotic treatment to prevent worsening disease, plus I recommended I and D of the lesion today    Cause is presumable MRSA, or other SA  Cover for MRSA with clinda    PROCEDURE: the area was prepped with betadine and using an 11 blade, an incision was placed. The abcess was then drained and 10ml of serosanguinous material was expressed dressed/covered. Pt tolerated procedure well without complications. EBL was minimal.     The extent of cellulitis was marked with a skin marker    Pt needs close follow up and I recommend f/u with PCP within 2 days for reassessment, change in antibiotics  and additional I and D if needed    Alexander Robles MD MPH

## 2019-07-17 PROBLEM — L02.212 BACK ABSCESS: Status: ACTIVE | Noted: 2019-01-01

## 2019-07-18 NOTE — PROGRESS NOTES
"[facility] Nurse Inpatient Wound Assessment   Reason for consultation: Evaluate and treat back wound     Assessment  Lower back abscess, unclear etiology, lanced in urgent care a few days ago.  Area remains very firm and inflamed.  Small purulent drainage expressed with palpation lateral to main reddened area; direct palpation of incised/reddened area did not produce any drainage.  No obvious tunneling or undermining; tissue is very firm and non-fluctuant.      Will need continued monitoring of need for further debridement.  There is minimal open wound to treat topically at this point, but will try to pack a little Iodoform gauze in the existing open areas.      Treatment Plan  Lower back wound: BID and prn: (can likely decrease to daily in a few days if minimal drainage)  1.  Cleanse with MicroKlenz.    2.  Pack with 1/4\" Iodoform gauze.  Cut a strip approx 4-5\" long and pack as much as will fit into the open slits using wooden end of qtip.    3.  Cover with gauze and 1/2 ABD pad, secure with Medipore tape.  Label with time/date/initials.     Orders Written  Recommend Surgery continue to follow  [facility] Nurse follow-up plan:weekly  Nursing to notify the Provider(s) and re-consult the [facility] Nurse if wound(s) deteriorates or new skin concern.    Patient History  According to provider note(s):  Mr. Reid is a 59-year-old male with a past medical history significant for diabetes and hypertension who presents to the Emergency Department with worsening cellulitis and abscess.     Objective Data  Containment of urine/stool: continent    Active Diet Order  Orders Placed This Encounter      Combination Diet 2716-0274 Calories: Moderate Consistent CHO (4-6 CHO units/meal)    Output:   No intake/output data recorded.    Risk Assessment:   Sensory Perception: 4-->no impairment  Moisture: 4-->rarely moist  Activity: 3-->walks occasionally  Mobility: 3-->slightly limited  Nutrition: 3-->adequate  Friction and Shear: 3-->no apparent " problem  Chuckie Score: 20                          Labs:   Recent Labs   Lab 07/18/19  0705 07/17/19  1939   ALBUMIN  --  2.1*   HGB 13.8 15.5   WBC 28.4* 23.4*       Physical Exam  Skin inspection: focused back    Wound Location:  Lower mid back  Date of last photo 7-18-19 (depth as shown on red area of qtip)        Wound History: Pt states started having issues about 9 days ago.  Local I&D in urgent care a few days ago, but has had minimal drainage.    Measurements (length x width x depth, in cm) reddened area approx 6 x 14cm.  Horizontal incision approx 0.2 x 1 x 0.6cm.  Superior small opening approx 0.2 x 0.4 x 0.5cm.    Wound Base: fibrous whitish and pink  Tunneling N/A  Undermining N/A  Palpation of the wound bed: firm   Periwound skin: red and firm, mild denudement  Color: pink-red  Temperature: normal  Drainage: small serosang on old dressing, small purulent expressed with palpation lateral to wound  Odor: none  Pain: minimal today during cares    Interventions  Current support surface: Standard  Atmos Air mattress  Current off-loading measures: n/a, pt moves independently  Visual inspection of wound(s) completed  Wound Care: done per plan of care  Supplies: reviewed, gathered and placed at the bedside  Education provided: plan of care and wound progress  Discussed plan of care with Patient and Nurse    Monae Ramon RN

## 2019-07-18 NOTE — PHARMACY-VANCOMYCIN DOSING SERVICE
Pharmacy Vancomycin Initial Note  Date of Service 2019  Patient's  1960  59 year old, male    Indication: Skin and Soft Tissue Infection    Current estimated CrCl = Estimated Creatinine Clearance: 93.8 mL/min (based on SCr of 0.99 mg/dL).    Creatinine for last 3 days  2019:  7:39 PM Creatinine 0.99 mg/dL    Recent Vancomycin Level(s) for last 3 days  No results found for requested labs within last 72 hours.      Vancomycin IV Administrations (past 72 hours)                   vancomycin (VANCOCIN) 1,750 mg in sodium chloride 0.9 % 500 mL intermittent infusion (mg) 1,750 mg New Bag 19                Nephrotoxins and other renal medications (From now, onward)    Start     Dose/Rate Route Frequency Ordered Stop    19  lisinopril-hydrochlorothiazide (PRINZIDE/ZESTORETIC) 20-12.5 MG per tablet 1 tablet      1 tablet Oral AT BEDTIME 19  piperacillin-tazobactam (ZOSYN) 3.375 g vial to attach to  mL bag     Note to Pharmacy:  Pharmacy can adjust dose based on renal function.    3.375 g  over 30 Minutes Intravenous EVERY 6 HOURS 19  vancomycin (VANCOCIN) 1,750 mg in sodium chloride 0.9 % 500 mL intermittent infusion      1,750 mg  over 2 Hours Intravenous ONCE 19            Contrast Orders - past 72 hours (72h ago, onward)    None                Plan:  1.  Start vancomycin  1750 mg IV q12h.   2.  Goal Trough Level: 15   3.  Pharmacy will check trough levels as appropriate in 1-3 Days.    4. Serum creatinine levels will be ordered daily for the first week of therapy and at least twice weekly for subsequent weeks.    5. Edwards method utilized to dose vancomycin therapy: Method 2    Maddi Martinez

## 2019-07-18 NOTE — ED PROVIDER NOTES
History     Chief Complaint:  Wound Infection    HPI   Jose Reid is a 59 year old male with a history of diabetes and hypertension who presents with a wound infection. The patient states the first noticed the wound on his back 9 days ago during a long car ride. He states that he thinks it was due to his insulin. The patient has since developed a fever and went to urgent care yesterday to get the wound drained. He states that he drained 30 cc's The patient states that at urgent care they marked the abscess and stated that he should go to the ED if it extended past the markings or if his fever returned. The patient states that it has extended some and that his fever returned around 1400 today. The patient also states he has been having some abdominal pain as well as nausea. He denies any vomiting. He has not taken his insulin the last few days because of lay of appetite.     Allergies:  exenatide  hmg-Coa-R inhibitors  Amlodipine    Medications:    Clindamycin  Baby aspirin   Insulin  Lisinopril  Metformin     Past Medical History:    Diabetes  Hypertension    Past Surgical History:    appendectomy    Family History:    No past pertinent family history.    Social History:  The patient was accompanied to the ED by wife.  Smoking Status: Never Smoker  Smokeless Tobacco: Never Used  Alcohol Use: Positive  Marital Status:         Review of Systems   Constitutional: Positive for appetite change and fever.   Gastrointestinal: Positive for abdominal pain and nausea. Negative for vomiting.   Skin: Positive for wound.   All other systems reviewed and are negative.      Physical Exam     Patient Vitals for the past 24 hrs:   BP Temp Temp src Pulse Resp SpO2 Height Weight   07/17/19 2130 (!) 141/98 -- -- 97 -- 97 % -- --   07/17/19 2100 (!) 152/92 -- -- 103 -- 95 % -- --   07/17/19 2030 145/90 -- -- 102 -- 96 % -- --   07/17/19 2000 137/89 -- -- 108 -- 92 % -- --   07/17/19 1930 (!) 163/105 -- -- 111 -- -- -- --  "  07/17/19 1918 (!) 186/109 98.4  F (36.9  C) Oral 117 18 96 % 1.803 m (5' 11\") 90.7 kg (200 lb)     Physical Exam  Nursing note and vitals reviewed.  Constitutional:  Appears well-developed and well-nourished.   HENT:   Head:    Atraumatic.   Mouth/Throat:   Oropharynx is clear and moist. No oropharyngeal exudate.   Eyes:    Pupils are equal, round, and reactive to light.   Neck:    Normal range of motion. Neck supple.      No tracheal deviation present. No thyromegaly present.   Cardiovascular:  Normal rate, regular rhythm, no murmur   Pulmonary/Chest: Breath sounds are clear and equal without wheezes or crackles.  Abdominal:   Soft. Bowel sounds are normal. Exhibits no distension and      no mass. There is no tenderness.      There is no rebound and no guarding.   Musculoskeletal:  Exhibits no edema.   Lymphadenopathy:  No cervical adenopathy.   Neurological:   Alert and oriented to person, place, and time.   Skin:    Skin is warm and dry. No rash noted. No pallor.   Back:   15 cm by 5 cm diameter area of erythema, swelling, tenderness and warmth across the mid back at the junction of the thoracic and lumbar spine with induration and pustules. The cellulitic is marked with a skin marker and the majority still within the marking but has extended outside the marking 2 cm to the left lateral asp[ect.     Emergency Department Course     Laboratory:  Laboratory findings were communicated with the patient who voiced understanding of the findings.    1941 - ISTAT Lactate: pH 7.40, pCO2 40, pO2 37, Bicarbonate 25, Lactic Acid 1.2    CBC: WBC 23.4(H), HGB 15.5,   CMP: glucose 406 (H) albumin 2.1 (H) o/w WNL (Creatinine 0.99)  Lactic acid whole blood: 1.5  Lipase: 45 (L)    Blood culture X2: pending  Wound culture aerobic bacterial: pending    Procedures:    Incision and Drainage     LOCATIONS:  mid back at the junction of the thoracic and lumbar spine      ANESTHESIA:  Local field block using LMX4     PREPARATION:  " Cleansed with Alcohol swabs     PROCEDURE:  Area was incised with # 11 Blade ([place]) with 2 Single Straight incision. Wound treatment included dark yellow Purulent Drainage primary from the inferior inscion.  Packing consisted of No Packing.  Appropriate dressing was applied to cover the area.    PATIENT STATUS: Patient tolerated the procedure well. There were no complications.      Interventions:  1954 NS, 2 L, IV  1954 Zofran 4 mg   1954 Dilaudid 0.5 mg IV  2007 Zosyn 4.5 g IV  2008 LMX4 cream topical   2044 Dilaudid 0.5 mg IV  2102 vancomycin 1,750 mg IV    Emergency Department Course:     Nursing notes and vitals reviewed.    1928 I performed an exam of the patient as documented above.     1939 IV was inserted and blood was drawn for laboratory testing, results above.    1941 ISTAT gases were collected, results above.    2010 I applied LMX4 in preparation of I&D.     2044 I performed an I&D on the patient's abscess as noted above.     2100 I personally reviewed the laboratory results with the patient and answered all related questions prior to admit.    2141  I spoke with  of the Hospitalist service from [facility] regarding patient's presentation, findings, and plan of care.      Impression & Plan      Medical Decision Making:  Jose Reid is a 59 year old male who presents to the emergency department today for evaluation of a wound infection. I found the patient to have a abscess with cellulitis and systematic inflammatory response syndrome failing outpatient antibiotic treatment. He also has poorly diabetes with hyperglycemia so he was placed back on his Lantuss insulin and given sliding scale insulin here. I performed a second abscess I&D and obtained a large amount of purulent drainage and he was placed on vancomycin and zosyn IV and admitted to the hospitlist      Diagnosis:    ICD-10-CM    1. Cellulitis and abscess of trunk L03.319 Wound Culture Aerobic Bacterial    L02.219     2. SIRS (systemic inflammatory response syndrome) (H) R65.10    3. Hyperglycemia R73.9         Disposition:   The patient is admitted into the care of Dr. Manuel.    Scribe Disclosure:  I, Dori Johnson, am serving as a scribe at 7:31 PM on 7/17/2019 to document services personally performed by Oanh Lo MD based on my observations and the provider's statements to me.     EMERGENCY DEPARTMENT       Oanh Lo MD  07/17/19 3456

## 2019-07-18 NOTE — ED TRIAGE NOTES
Abscess with cellulitis to back.  Worse today.  Fever also.  Instructed to return for further eval.

## 2019-07-18 NOTE — ED NOTES
"[facility]  ED Nurse Handoff Report    ED Chief complaint: Wound Infection      ED Diagnosis:   Final diagnoses:   None       Code Status: Full Code    Allergies:   Allergies   Allergen Reactions     Exenatide Rash     Hmg-Coa-R Inhibitors Muscle Pain (Myalgia) and Other (See Comments)     Extreme Fatigue       Amlodipine      Other reaction(s): Dizziness  Dizzy         Activity level - Baseline/Home:  Independent  Activity Level - Current:   Independent    Patient's Preferred language: English   Needed?: No    Isolation: No  Infection: Not Applicable  Bariatric?: No    Vital Signs:   Vitals:    07/17/19 1930 07/17/19 2000 07/17/19 2030 07/17/19 2100   BP: (!) 163/105 137/89 145/90 (!) 152/92   Pulse: 111 108 102 103   Resp:       Temp:       TempSrc:       SpO2:  92% 96% 95%   Weight:       Height:           Cardiac Rhythm: ,        Pain level:      Is this patient confused?: No   Does this patient have a guardian?  No         If yes, is there guardianship documents in the Epic \"Code/ACP\" activity?  N/A         Guardian Notified?  N/A  [place] - Suicide Severity Rating Scale Completed?  Yes  If yes, what color did the patient score?  White    Patient Report: Initial Complaint: wound infection  Focused Assessment: pt noticed wound on lower back. Pt was seen at PCP yesterday for I &D and PCP marked skin. Pt was told to come to the ED if noticed that infection had spread beyond markings. Pt had another I&D of wound in ED with cultures sent. Pt did start ABx here. Pt to be admitted for further evaluation  Tests Performed: labs, cultures  Abnormal Results:   Labs Ordered and Resulted from Time of ED Arrival Up to the Time of Departure from the ED   CBC WITH PLATELETS DIFFERENTIAL - Abnormal; Notable for the following components:       Result Value    WBC 23.4 (*)     Absolute Neutrophil 17.3 (*)     Absolute Monocytes 3.0 (*)     All other components within normal limits   LIPASE - Abnormal; " Notable for the following components:    Lipase 45 (*)     All other components within normal limits   COMPREHENSIVE METABOLIC PANEL - Abnormal; Notable for the following components:    Glucose 406 (*)     Albumin 2.1 (*)     All other components within normal limits   LACTIC ACID WHOLE BLOOD   ISTAT  GASES LACTATE VINH POCT   BLOOD CULTURE   BLOOD CULTURE   WOUND CULTURE AEROBIC BACTERIAL   WOUND CULTURE AEROBIC BACTERIAL   GRAM STAIN       Treatments provided: fluids, Abx,     Family Comments: wife at bedside    OBS brochure/video discussed/provided to patient/family: N/A              Name of person given brochure if not patient: NA              Relationship to patient: NA    ED Medications:   Medications   vancomycin (VANCOCIN) 1,750 mg in sodium chloride 0.9 % 500 mL intermittent infusion (1,750 mg Intravenous New Bag 7/17/19 2102)   lidocaine (LMX4) cream (has no administration in time range)   insulin glargine (LANTUS PEN) injection 40 Units (has no administration in time range)   insulin regular (HumuLIN R/NovoLIN R VIAL) injection 8 Units (has no administration in time range)   0.9% sodium chloride BOLUS (2,000 mLs Intravenous New Bag 7/17/19 1954)   HYDROmorphone (PF) (DILAUDID) injection 0.5 mg (0.5 mg Intravenous Given 7/17/19 1954)   ondansetron (ZOFRAN) injection 4 mg (4 mg Intravenous Given 7/17/19 1954)   piperacillin-tazobactam (ZOSYN) 4.5 g vial to attach to  mL bag (0 g Intravenous Stopped 7/17/19 2039)   lidocaine (LMX4) 4 % cream (  Given 7/17/19 2008)   HYDROmorphone (PF) (DILAUDID) 0.5 MG/0.5 ML injection (0.5 mg  Given 7/17/19 2044)       Drips infusing?:  No    For the majority of the shift this patient was Green.   Interventions performed were none.    Severe Sepsis OR Septic Shock Diagnosis Present: No    To be done/followed up on inpatient unit:  JOHANA Gibson protocol    ED NURSE PHONE NUMBER: *[phone]

## 2019-07-18 NOTE — H&P
Admitted:     07/17/2019      PRIMARY CARE PHYSICIAN:  Dr. Luis Larry PA-C       CODE STATUS:  Full code.      CHIEF COMPLAINT:  Back abscess.      HISTORY OF PRESENT ILLNESS:  Mr. Reid is a 59-year-old male with a past medical history significant for diabetes and hypertension who presents to the Emergency Department with worsening cellulitis and abscess of the back.  History is obtained through discussion with the ED physician as well as the patient and his wife at bedside.  The patient has now had some sort of back discomfort and swelling for approximately 8 days.  He does not recall any trauma but did note that he has had what he describes as hives periodically that he thinks may have started after he initiated insulin a couple of months ago and he may have scratched an area of his back which started this infection.  He noted an area on his mid lower back that was inflamed and was uncomfortable and over time grew larger and also became erythematous.  His PCP was out of town, so he went to urgent care yesterday where they drained the abscess with reported 30 mL of purulent fluid.  He was started on clindamycin.  There was an outline drawn of the erythematous area and the wife today noted that the erythema extends beyond the borders on the left side.  The patient had some reaccumulation of his fluid and generally was feeling unwell.  The patient noted that he was febrile last evening but took some Advil and was not as febrile this morning, but otherwise felt fatigued, a little nauseated, and generally unwell.  The pain in his back is worsened compared to prior.  Her blood sugars have also been out of control in the 400s though he did miss his insulin last evening.  With all this, the patient came to the ED where he had a repeat I and D done with additional drainage of purulent material.  He was started on vancomycin and Zosyn and a fluid culture was sent.  White count noted to be elevated to 23.  The  remainder of his labs are unremarkable save for a blood sugar of over 400.      When I saw the patient, he was resting fairly comfortably and was feeling better after getting pain medication.  He does not have any nausea currently.  Again, does not recall any trauma otherwise to his back other than scratching the skin.  He does get pimples on his back relatively frequently.  The patient also tells me that his blood sugars have historically been fairly difficult to control as he was diagnosed with type 2 diabetes, a number of years ago and was started on a typical oral medications for this but was not having improvement of his blood sugars.  He was at one point told that he was diabetes 1.5 and most recently told he was diabetes 1 and started on insulin.  Blood sugars recently have been in the low 200s, which is a substantial improvement from where he was prior.  Most recent hemoglobin A1c was in May and noted to be 14.4, improved from a greater than 15 in March.      PAST MEDICAL HISTORY:   1.  Diabetes type 1, currently on insulin.   2.  Hypertension.      MEDICATIONS:    Medications Prior to Admission   Medication Sig Dispense Refill Last Dose     aspirin 81 MG EC tablet Take 81 mg by mouth At Bedtime  90 tablet 3 7/16/2019 at hs     clindamycin (CLEOCIN) 300 MG capsule Take 1 capsule (300 mg) by mouth 3 times daily for 10 days 30 capsule 0 7/17/2019 at x 2 doses     cyanocobalamin (CYANOCOBALAMIN) 1000 MCG/ML injection Inject 1,000 mcg into the muscle every 3 days    7/15/2019 at hs     insulin glargine (LANTUS SOLOSTAR PEN) 100 UNIT/ML pen Inject 50 Units Subcutaneous daily (Patient taking differently: Inject 30-40 Units Subcutaneous At Bedtime ) 50 mL 1 7/16/2019 at hs     lisinopril-hydrochlorothiazide (PRINZIDE/ZESTORETIC) 20-12.5 MG tablet Take 1 tablet by mouth At Bedtime   7/14/2019 at hs     NONFORMULARY Take 2 tablets by mouth At Bedtime Magnesium 2 tablets at bedtime Strength unknown   7/14/2019 at hs      vitamin B complex with vitamin C (STRESS TAB) tablet Take 1 tablet by mouth At Bedtime   7/14/2019 at hs     VITAMIN D, CHOLECALCIFEROL, PO Take 2 tablets by mouth At Bedtime strength unknown   7/16/2019 at hs     insulin pen needle (31G X 5 MM) 31G X 5 MM miscellaneous Use 1 pen needles daily or as directed. 100 each 1 Taking           SOCIAL HISTORY:  The patient denies any use of tobacco or alcohol.      FAMILY HISTORY:  Father had a cerebellar atrophy, similar to Parkinson's disease and mother is healthy.      ALLERGIES:  AMLODIPINE, STATIN AND EXENATIDE.      REVIEW OF SYSTEMS:  The complete review of systems reviewed and negative, save for the pertinent positives recorded in the HPI.      PHYSICAL EXAMINATION:   VITAL SIGNS:  Show blood pressure of 141/98, heart rate 97, respirations 18, satting 97% on room air, temperature 98.4 degrees Fahrenheit.   GENERAL:  The patient is lying in bed and resting comfortably.   HEENT:  Pupils equal, round, reactive to light, extraocular muscle function intact.  No scleral icterus.  Oropharynx is clear.   NECK:  No lymphadenopathy or thyromegaly.   CARDIOVASCULAR:  Heart is regular rate and rhythm without any murmur, rub or gallop.   PULMONARY:  Clear to auscultation bilaterally without wheeze or rhonchi.   GASTROINTESTINAL:  Positive bowel sounds, soft, nontender and nondistended.   SKIN:  There is an area of erythema in the back surrounding area of induration.  The erythema has extended beyond the drawn borders, especially towards the left.  The entire area is warm to the touch.   LYMPHATICS:  No peripheral edema.   PSYCHIATRIC:   normal affect.   NEUROLOGIC:  Cranial nerves II-XII are grossly intact.  No focal deficits.      LABORATORY DATA:  CBC, BMP, and LFTs unremarkable save for a white count of 23.4, preliminary Gram stain shows gram-positive cocci.      ASSESSMENT AND PLAN:  Mr. Reid is a 59-year-old male with a past medical history significant for diabetes  and hypertension who presents to the Emergency Department with worsening cellulitis and abscess.   1.  Back abscess with surrounding cellulitis:  He is status post I and D x 2 and Gram stain preliminarily is growing gram-positive cocci.  He has been initiated on vancomycin and Zosyn, which I will continue for now, but may be able to de-escalate tomorrow once culture results are returned.  We will follow up also on blood culture results, which are currently pending.  Given the fairly large area of abscess and induration, I will consult General Surgery to see if there is any further intervention warranted at this point.   2.  Diabetes with hyperglycemia:  The patient has not had any insulin for 24 hours leading up to his ED stay.  He is given 40 units of Lantus in the ED, which I will continue starting tomorrow and also place on sliding scale.   3.  Hypertension:  We will continue with lisinopril with hold parameters.   4.  Deep vein thrombosis prophylaxis:  SCDs.         HERMAN ALBERT DO             D: 2019   T: 2019   MT: PATY      Name:     KATLYN RAMIREZ   MRN:      [id number]        Account:      [id number]   :      1960        Admitted:     2019                   Document: [id number]       cc: Luis Larry MD

## 2019-07-18 NOTE — PROGRESS NOTES
"[facility]  Hospitalist Progress Note  Miguel Ángel Lee MD  07/18/2019    Assessment & Plan   ASSESSMENT AND PLAN:  Mr. Reid is a 59-year-old male with a past medical history significant for diabetes and hypertension who presents to the Emergency Department with worsening cellulitis and abscess.   1.  Back abscess with surrounding cellulitis - suspect from poor diabetic control.  -- seen by general surgery  -- await microbiology, G/S shows [facility]  -- woc RN consulted  -- oxycodone for pain  -- continue on vancomycin and Zosyn with plans to de-escalate antibiotics.  -- improve BG control  2.  Diabetes II with poor control and hyperglycemia due to #1:   -- The patient has not had any insulin for 24 hours leading up to his ED stay, he may have some medical compliance issues.  He admitted that he can miss up to once a week.  -- will increase at bedtime lantus to 50, he takes 40 units.  -- will increase sliding scale insulin from med to high intensity.  -- will add AM lantus at 20 units, NPH 10 units now.  -- he admits he doesn't take metformin which may not be of any benefit in his current clinical state.  -- had an endocrinologist who moved and hasn't seen for over a year, currently being managed by PA as outpatient.  -- will order outpatient endocrinology consultation.  3.  Hypertension:   -- continue with lisinopril with hold parameters.   4.  Deep vein thrombosis prophylaxis:  SCDs.   5. Disposition  -- home in 2 days.         Interval History   -- chart reviewed  -- patient interviewed  -- has not seen endocrinology for over a year    -Data reviewed today: I reviewed all new labs and imaging over the last 24 hours. I personally reviewed no images or EKG's today.    Physical Exam    , Blood pressure 104/63, pulse 100, temperature 98.9  F (37.2  C), temperature source Oral, resp. rate 18, height 1.803 m (5' 11\"), weight 90.7 kg (200 lb), SpO2 95 %.  Vitals:    07/17/19 1918   Weight: 90.7 kg (200 " lb)     Vital Signs with Ranges  Temp:  [98.4  F (36.9  C)-100.4  F (38  C)] 98.9  F (37.2  C)  Pulse:  [] 100  Resp:  [18] 18  BP: (104-186)/() 104/63  SpO2:  [92 %-97 %] 95 %  I/O's Last 24 hours  No intake/output data recorded.    Constitutional: Awake, alert, cooperative, no apparent distress, not toxic  Respiratory: Clear to auscultation bilaterally, no crackles or wheezing  Cardiovascular: Regular rate and rhythm, normal S1 and S2, and no murmur noted  GI: Normal bowel sounds, soft, non-distended, non-tender  Skin/Integumen: No rashes, no cyanosis, no edema, low back erythema, skin deficit from I/D and pain with palpation.  Other:      Medications   All medications were reviewed.      aspirin  81 mg Oral At Bedtime     cyanocobalamin  1,000 mcg Intramuscular Q3 Days     insulin aspart  1-10 Units Subcutaneous TID AC     insulin aspart  1-7 Units Subcutaneous At Bedtime     [START ON 7/19/2019] insulin glargine  20 Units Subcutaneous QAM AC     insulin glargine  40 Units Subcutaneous At Bedtime     insulin isophane human  10 Units Subcutaneous Once     lisinopril-hydrochlorothiazide  1 tablet Oral At Bedtime     piperacillin-tazobactam  3.375 g Intravenous Q6H     sodium chloride (PF)  3 mL Intracatheter Q8H     vancomycin place fernandez - receiving intermittent dosing  1 each Intravenous See Admin Instructions     vitamin B complex with vitamin C  1 tablet Oral At Bedtime     Vitamin D (Cholecalciferol)   Oral At Bedtime        Data   Recent Labs   Lab 07/18/19  0705 07/17/19  1939   WBC 28.4* 23.4*   HGB 13.8 15.5   MCV 89 87    247    133   POTASSIUM 3.7 4.0   CHLORIDE 103 99   CO2 29 27   BUN 28 27   CR 1.50* 0.99   ANIONGAP 5 7   SUE 8.2* 8.8   * 406*   ALBUMIN  --  2.1*   PROTTOTAL  --  6.8   BILITOTAL  --  0.5   ALKPHOS  --  109   ALT  --  14   AST  --  10   LIPASE  --  45*       No results found for this or any previous visit (from the past 24 hour(s)).    Miguel Ángel Lee  MD  Text Page  (7am to 6pm)

## 2019-07-18 NOTE — PHARMACY-ADMISSION MEDICATION HISTORY
"Admission medication history interview status for the 7/17/2019  admission is complete. See EPIC admission navigator for prior to admission medications     Medication history source reliability:Good    Actions taken by pharmacist (provider contacted, etc):None     Additional medication history information not noted on PTA med list :None    Medication reconciliation/reorder completed by provider prior to medication history? No    Time spent in this activity: 20\"    Prior to Admission medications    Medication Sig Last Dose Taking? Auth Provider   aspirin 81 MG EC tablet Take 81 mg by mouth At Bedtime  7/16/2019 at hs Yes Arnie Walker MD   clindamycin (CLEOCIN) 300 MG capsule Take 1 capsule (300 mg) by mouth 3 times daily for 10 days 7/17/2019 at x 2 doses Yes Alexander Robles MD   cyanocobalamin (CYANOCOBALAMIN) 1000 MCG/ML injection Inject 1,000 mcg into the muscle every 3 days  7/15/2019 at hs Yes Unknown, Entered By History   insulin glargine (LANTUS SOLOSTAR PEN) 100 UNIT/ML pen Inject 50 Units Subcutaneous daily  Patient taking differently: Inject 30-40 Units Subcutaneous At Bedtime  7/16/2019 at hs Yes Marcial Larry PA-C   lisinopril-hydrochlorothiazide (PRINZIDE/ZESTORETIC) 20-12.5 MG tablet Take 1 tablet by mouth At Bedtime 7/14/2019 at hs Yes Unknown, Entered By History   NONFORMULARY Take 2 tablets by mouth At Bedtime Magnesium 2 tablets at bedtime Strength unknown 7/14/2019 at hs Yes Unknown, Entered By History   vitamin B complex with vitamin C (STRESS TAB) tablet Take 1 tablet by mouth At Bedtime 7/14/2019 at hs Yes Unknown, Entered By History   VITAMIN D, CHOLECALCIFEROL, PO Take 2 tablets by mouth At Bedtime strength unknown 7/16/2019 at hs Yes Unknown, Entered By History   insulin pen needle (31G X 5 MM) 31G X 5 MM miscellaneous Use 1 pen needles daily or as directed.   Marcial Larry PA-C         "

## 2019-07-18 NOTE — PROVIDER NOTIFICATION
RECEIVING UNIT ED HANDOFF REVIEW    ED Nurse Handoff Report was reviewed by: Ethan Baldwin on July 17, 2019 at 10:22 PM

## 2019-07-18 NOTE — CONSULTS
General Surgery Consultation    Jose Reid MRN# [id number]   Age: 59 year old YOB: 1960     Date of Admission:  7/17/2019    Reason for consult:  Back abscess       Requesting physician: Mar                Assessment and Plan:   Assessment:   59-year-old diabetic with back abscess incision and drainage at urgent care 2 days ago      Plan:   So modest amount of purulent drainage.  Question whether or not the drainage was adequate..  Will ask wound care nurse to assess.  If not significantly improved in the next 24 hours would give consideration to more extensive debridement             Chief Complaint:   Back abscess     History is obtained from the patient and electronic health record         History of Present Illness:   This patient is a 59 year old  male  who presents with   .  Back abscess.  He noted this development and was seen at an urgent care facility 2 days ago and this was incised and drained.  He was sent home on oral antibiotics and instructed to back should he have ongoing fever.  Reports ongoing fever and therefore presented and was admitted..          Past Medical History:    has a past medical history of Diabetes (H) and Hypertension.          Past Surgical History:   History reviewed. No pertinent surgical history.        Medications:     No current facility-administered medications on file prior to encounter.   Current Outpatient Medications on File Prior to Encounter:  aspirin 81 MG EC tablet Take 81 mg by mouth At Bedtime    clindamycin (CLEOCIN) 300 MG capsule Take 1 capsule (300 mg) by mouth 3 times daily for 10 days   cyanocobalamin (CYANOCOBALAMIN) 1000 MCG/ML injection Inject 1,000 mcg into the muscle every 3 days    insulin glargine (LANTUS SOLOSTAR PEN) 100 UNIT/ML pen Inject 50 Units Subcutaneous daily (Patient taking differently: Inject 30-40 Units Subcutaneous At Bedtime )   lisinopril-hydrochlorothiazide (PRINZIDE/ZESTORETIC) 20-12.5 MG tablet Take 1 tablet by  "mouth At Bedtime   NONFORMULARY Take 2 tablets by mouth At Bedtime Magnesium 2 tablets at bedtime Strength unknown   vitamin B complex with vitamin C (STRESS TAB) tablet Take 1 tablet by mouth At Bedtime   VITAMIN D, CHOLECALCIFEROL, PO Take 2 tablets by mouth At Bedtime strength unknown   insulin pen needle (31G X 5 MM) 31G X 5 MM miscellaneous Use 1 pen needles daily or as directed.         Allergies:      Allergies   Allergen Reactions     Exenatide Rash     Hmg-Coa-R Inhibitors Muscle Pain (Myalgia) and Other (See Comments)     Extreme Fatigue       Amlodipine      Other reaction(s): Dizziness  Dizzy              Social History:   Denies tobacco or alcohol use          Family History:   The patient has no family history of any bleeding, clotting or anesthesia problems.          Review of Systems:   Ten point Review of Systems is negative other than noted in the HPI          Physical Exam:   Gen:  This is a well developed, well nourished man in no apparent distress.  Blood pressure 104/63, pulse 100, temperature 98.9  F (37.2  C), temperature source Oral, resp. rate 18, height 1.803 m (5' 11\"), weight 90.7 kg (200 lb), SpO2 95 %.  HEENT - Normocephalic, atraumatic, mucous membranes moist.  no scleral icterus.  Patient was examined in the prone position.  In the low back there is a 1 cm skin incision overlying the erythematous indurated area.  Wound is rather small but with some palpation the area could express some purulent appearing fluid.  No significant fluctuance.  Neurologic - nonfocal          Data:   WBC -   WBC   Date Value Ref Range Status   07/18/2019 28.4 (H) 4.0 - 11.0 10e9/L Final   ], HgB - Hemoglobin   Date Value Ref Range Status   07/18/2019 13.8 13.3 - 17.7 g/dL Final   ]   Liver Function Studies -   Recent Labs   Lab Test 07/17/19  1939   PROTTOTAL 6.8   ALBUMIN 2.1*   BILITOTAL 0.5   ALKPHOS 109   AST 10   ALT 14     CT scan of the abdomen:      Ultrasound of the abdomen:     Richard Tierney " MD John

## 2019-07-19 NOTE — PROGRESS NOTES
Redness seems somewhat improved.  Dressings were taken down.  Probed the wound with a Q-tip.  There are fluctuant areas lateral to the primary opening at both 3 and 9:00.  I think he would benefit from more extensive incision and drainage.  He has had breakfast this morning we will make arrangements to do this under local in the endoscopy unit.

## 2019-07-19 NOTE — PROGRESS NOTES
[facility]  Hospitalist Progress Note  Miguel Ángel Lee MD  07/19/2019    Assessment & Plan   ASSESSMENT AND PLAN:  Mr. Reid is a 59-year-old male with a past medical history significant for diabetes and hypertension who presents to the Emergency Department with worsening cellulitis and abscess.   1. POD # 0  Back abscess with surrounding cellulitis - suspect from poor diabetic control.  -- seen by general surgery, to OR with further I and D  -- await microbiology, G/S shows GPC, showing staph aureus  -- woc RN consulted  -- oxycodone for pain  -- was treated with vancomycin and Zosyn but now with acute kidney injury  -- hold vancomycin, ID consult for likely daptomycin    2. YORDY  -- prior to admission had normal kidney function  -- rapid rise in creatinine likely due to vancomycin in setting of diabetic kidney on lisinonpril/hctz  -- stopped vancomycin, lisinopril and hydrochlorothiazide.  -- check UA, renal US  -- strict I/O and daily weights  -- nephrology to see.    3. Diabetes II with poor control and hyperglycemia due to #1:   -- The patient has not had any insulin for 24 hours leading up to his ED stay, he may have some medical compliance issues.  He admitted that he can miss up to once a week.  -- bedtime lantus to 50 (PTA he takes 40 units).  -- continue high intensity sliding scale insulin    -- continue AM lantus at 20 units   -- he admits he doesn't take metformin   -- had an endocrinologist who moved and hasn't seen for over a year, currently being managed by PA as outpatient.  -- will need outpatient endocrinology consultation.  3.  Hypertension:   -- hold lisinopril/hctz, in setting of YORDY  -- iv hydralazine for sbp > 160   4.  Deep vein thrombosis prophylaxis:  SCDs.   5. Disposition  -- > 2 days    Interval History   -- chart reviewed  -- patient interviewed  -- has not seen endocrinology for over a year    -Data reviewed today: I reviewed all new labs and imaging over the last  "24 hours. I personally reviewed no images or EKG's today.    Physical Exam    , Blood pressure 145/80, pulse 99, temperature 99.6  F (37.6  C), temperature source Oral, resp. rate 24, height 1.803 m (5' 11\"), weight 90.7 kg (200 lb), SpO2 97 %.  Vitals:    07/17/19 1918   Weight: 90.7 kg (200 lb)     Vital Signs with Ranges  Temp:  [98.6  F (37  C)-99.6  F (37.6  C)] 99.6  F (37.6  C)  Pulse:  [89-99] 99  Resp:  [16-24] 24  BP: (104-145)/(62-80) 145/80  SpO2:  [94 %-97 %] 97 %  I/O's Last 24 hours  No intake/output data recorded.    Constitutional: Awake, alert, cooperative, no apparent distress, not toxic  Respiratory: Clear to auscultation bilaterally, no crackles or wheezing  Cardiovascular: Regular rate and rhythm, normal S1 and S2, and no murmur noted  GI: Normal bowel sounds, soft, non-distended, non-tender  Skin/Integumen: No rashes, no cyanosis, no edema, low back erythema, skin deficit from I/D and pain with palpation.  Other:      Medications   All medications were reviewed.      aspirin  81 mg Oral At Bedtime     cyanocobalamin  1,000 mcg Intramuscular Q3 Days     insulin aspart  1-10 Units Subcutaneous TID AC     insulin aspart  1-7 Units Subcutaneous At Bedtime     insulin glargine  20 Units Subcutaneous QAM AC     insulin glargine  40 Units Subcutaneous At Bedtime     piperacillin-tazobactam  2.25 g Intravenous Q6H     sodium chloride (PF)  3 mL Intracatheter Q8H     vitamin B complex with vitamin C  1 tablet Oral At Bedtime     vitamin D3  2,000 Units Oral At Bedtime        Data   Recent Labs   Lab 07/19/19  0959 07/18/19  0705 07/17/19 1939   WBC 21.0* 28.4* 23.4*   HGB 13.0* 13.8 15.5   MCV 89 89 87    260 247    137 133   POTASSIUM 3.4 3.7 4.0   CHLORIDE 103 103 99   CO2 26 29 27   BUN 44* 28 27   CR 2.54* 1.50* 0.99   ANIONGAP 8 5 7   SUE 8.1* 8.2* 8.8   * 214* 406*   ALBUMIN  --   --  2.1*   PROTTOTAL  --   --  6.8   BILITOTAL  --   --  0.5   ALKPHOS  --   --  109   ALT  --   " --  14   AST  --   --  10   LIPASE  --   --  45*       No results found for this or any previous visit (from the past 24 hour(s)).    Miguel Ángel Lee MD  Text Page  (7am to 6pm)

## 2019-07-19 NOTE — PROGRESS NOTES
I reviewed his chart and labs. I reviewed the ultrasound, kidneys look unremarkable without obstruction or stone. Radiologist's read is pending. UA with micro, FEna, UPCR are pending. Vancomycin has been changed to dapto. Full consult in AM.

## 2019-07-19 NOTE — CONSULTS
Infectious Disease Consult Note.     Full consult note to follow.     Briefly 59 y.o make uncontrolled diabetes, admitted with back abscesses and cellulitis, s/p I and D, cultures with Staph aureus pending HILDA, on Vanco and zosyn with worsening renal function.     Plan:   Stop vanco and zosyn   Start daptomycin. ( ordered)   Follow HILDA on the Staph aureus. If MSSA can be switched to Ancef.   Blood cultures done and pending will follow.   YORDY management per IM.    Ana Laura Dean MD  Infectious Disease.

## 2019-07-19 NOTE — PHARMACY-VANCOMYCIN DOSING SERVICE
Pharmacy Vancomycin Note  Date of Service 2019  Patient's  1960   59 year old, male    Indication: Skin and Soft Tissue Infection  Goal Trough Level: 10-15 mg/L  Day of Therapy: 3  Current Vancomycin regimen: intermittent dosing  Current estimated CrCl = Estimated Creatinine Clearance: 36.5 mL/min (A) (based on SCr of 2.54 mg/dL (H)).    Creatinine for last 3 days  2019:  7:39 PM Creatinine 0.99 mg/dL  2019:  7:05 AM Creatinine 1.50 mg/dL  2019:  9:59 AM Creatinine 2.54 mg/dL    Recent Vancomycin Levels (past 3 days)  2019:  7:55 PM Vancomycin Level 21.1 mg/L  2019:  8:50 AM Vancomycin Level 16.6 mg/L    Vancomycin IV Administrations (past 72 hours)                   vancomycin (VANCOCIN) 1,750 mg in sodium chloride 0.9 % 500 mL intermittent infusion (mg) 1,750 mg New Bag 19 0824    vancomycin (VANCOCIN) 1,750 mg in sodium chloride 0.9 % 500 mL intermittent infusion (mg) 1,750 mg New Bag 19 2102                Nephrotoxins and other renal medications (From now, onward)    Start     Dose/Rate Route Frequency Ordered Stop    19 1400  piperacillin-tazobactam (ZOSYN) 2.25 g vial to attach to  ml bag     Note to Pharmacy:  Pharmacy can adjust dose based on renal function.    2.25 g  over 30 Minutes Intravenous EVERY 6 HOURS 19 1144      19 1200  vancomycin (VANCOCIN) 1,750 mg in sodium chloride 0.9 % 500 mL intermittent infusion      1,750 mg  over 2 Hours Intravenous ONCE 19 1149      19 1109  vancomycin place fernandez - receiving intermittent dosing      1 each Intravenous SEE ADMIN INSTRUCTIONS 19 1109      19 2300  lisinopril-hydrochlorothiazide (PRINZIDE/ZESTORETIC) 20-12.5 MG per tablet 1 tablet      1 tablet Oral AT BEDTIME 19 2233               Contrast Orders - past 72 hours (72h ago, onward)    None          Interpretation of levels and current regim    Renal Function: Worsening    Plan:  1.  Vancomycin  1750 mg x 1 dose  2.  Pharmacy will check trough levels as appropriate in 1-3 Days.    3. Serum creatinine levels will be ordered daily for the first week of therapy and at least twice weekly for subsequent weeks.      Viviane Flanagan        .

## 2019-07-19 NOTE — PHARMACY-VANCOMYCIN DOSING SERVICE
Pharmacy Vancomycin Note  Date of Service 2019  Patient's  1960   59 year old, male    Indication: Abscess and Skin and Soft Tissue Infection  Goal Trough Level: 10-15 mg/L  Day of Therapy: 2  Current Vancomycin regimen:   Intermittent based upon measured drug levels, Most recent dose was 1750 mg IV given  at 21:00     Current estimated CrCl = Estimated Creatinine Clearance: 61.9 mL/min (A) (based on SCr of 1.5 mg/dL (H)).    Creatinine for last 3 days  2019:  7:39 PM Creatinine 0.99 mg/dL  2019:  7:05 AM Creatinine 1.50 mg/dL    Recent Vancomycin Levels (past 3 days)  2019:  7:55 PM Vancomycin Level 21.1 mg/L    Vancomycin IV Administrations (past 72 hours)                   vancomycin (VANCOCIN) 1,750 mg in sodium chloride 0.9 % 500 mL intermittent infusion (mg) 1,750 mg New Bag 19 0824    vancomycin (VANCOCIN) 1,750 mg in sodium chloride 0.9 % 500 mL intermittent infusion (mg) 1,750 mg New Bag 19 2102                Nephrotoxins and other renal medications (From now, onward)    Start     Dose/Rate Route Frequency Ordered Stop    19 1109  vancomycin place fernandez - receiving intermittent dosing      1 each Intravenous SEE ADMIN INSTRUCTIONS 19 1109      19 0200  piperacillin-tazobactam (ZOSYN) 3.375 g vial to attach to  mL bag     Note to Pharmacy:  Pharmacy can adjust dose based on renal function.    3.375 g  over 30 Minutes Intravenous EVERY 6 HOURS 19 2233      07/17/19 2300  lisinopril-hydrochlorothiazide (PRINZIDE/ZESTORETIC) 20-12.5 MG per tablet 1 tablet      1 tablet Oral AT BEDTIME 19 2233               Contrast Orders - past 72 hours (72h ago, onward)    None          Interpretation of levels and current regimen:  Trough level is  Supratherapeutic    Has serum creatinine changed > 50% in last 72 hours: 0.99 to 1.5    Urine output:  unable to determine    Renal Function: Appears to have worsened    Plan:  1.  Will continue  intermittent dosing.  No dose now, recheck level in AM  2.  Pharmacy will check trough levels as appropriate in w/AM labs .    3. Serum creatinine levels will be ordered daily for the first week of therapy and at least twice weekly for subsequent weeks.      Terell لاعلي PharmD        .

## 2019-07-19 NOTE — OP NOTE
Preoperative diagnosis: Back abscess with surrounding cellulitis    Postoperative diagnosis: Same    Procedure: Incision and drainage of back abscess    Surgeon: Richard Lopez MD    Anesthesia: Local    Estimated blood loss is 5 cc    Indication for procedure: This is a 59-year-old gentleman who was admitted to the hospital 2 days ago with a back abscess.  This had been incised and drained at an outside facility.  He had ongoing significant cellulitis.  On examination he had a very minimal skin opening that was perhaps 4 to 5 mm in transverse dimension.  This could be probed minimally with a Q-tip.  There remained a large area of fluctuance lateral to this opening on both the right and left hand sides.  I discussed this with the patient and I recommended more extensive incision and drainage.  The risks and benefits were discussed.  His questions were answered and he consented to proceed.    Procedure: After informed consent was obtained the patient was placed prone on the procedure table in the endoscopy unit.  The area in question was then prepped with Hibiclens.  1% lidocaine with epinephrine was then used to anesthetize the area.  Starting from the small skin opening of the original incision and drainage I extended this laterally both to the right and left using a 15 blade scalpel.  This was carried rather deeply and I encountered multiple small pockets of purulent fluid.  There still remains some purulent material coming from subcutaneous tissues but I could not identify any further significant pocket.  I excised some of the skin to ensure that the wound would stay open better.  I then packed this with Betadine soaked 1 inch Nu Gauze.  Dressing was applied.  He was transferred back to his inpatient room in a stable condition.    Richard Lopez MD

## 2019-07-20 NOTE — PROGRESS NOTES
Surgery Progress Note  7/20/19    59M admitted with back abscess s/p &D urgent care 7/16.  Worsened required repeat I&D Lopez 7/19, packed with betadine soaked nugauze.    Patient remains AFVSS.   Awake alert oriented interactive appropriate  WBC 24>18    Procedure:  Dressing change performed to midline low back wound.    Packing removed.    Wound is ~5 x 2 cm at skin with about 0.5 cm undermining superiorly inferiorly and 1 cm undermining right and left lateral.    Base with fibrinous exudate.  There was some erythema/celluitis tracking lateral from wound, both sides L>R.   Was able to express some pus from the wound by pressing from these areas centrally. Small scissor used to open up these areas into the central wound, removed some fibrinous material.  Irrigated with 250cc NS.  Packed with betadine soaked kerlix.  Overlying gauze 4x4 and ABD secured with tape.  Patient tolerated well with PO oxy and IV dilaudid doses given 1 hour prior.    Plan:  -- Continue IV antibiotics.  Can narrow if MSSA.  -- Ok to replace overlying gauze pad and tape as needed for ongoing drainage.  -- Will perform dressing change again tomorrow morning.  If there is re accumulation of these lateral fluid collections or worsening of cellulitis, he may require more extensive debridement (consider MAC in OR).  Will make NPO at midnight in case this is required.    Will discuss further with Dr. Chino Vazquez, PGY4 General Surgery Resident

## 2019-07-20 NOTE — PROGRESS NOTES
[facility]  Hospitalist Progress Note  Britney Padilla MD  07/20/2019    Assessment & Plan   ASSESSMENT AND PLAN:  Mr. Reid is a 59-year-old male with a past medical history significant for diabetes and hypertension who presents to the Emergency Department with worsening cellulitis and abscess.   1. POD #1  Back abscess with surrounding cellulitis - suspect from poor diabetic control.  -- seen by general surgery, to OR with further I and D  -- await microbiology, G/S shows GPC, showing staph aureus  -- woc RN consulted  -- oxycodone for pain  -- was treated with vancomycin and Zosyn but now with acute kidney injury  -- Seen by ID, vancomycin switched to daptomycin.  At this point cultures are showing likely MSSA so can switch to Ancef.    2. YORDY  -- prior to admission had normal kidney function  -- rapid rise in creatinine likely due to vancomycin in setting of diabetic kidney on lisinonpril/hctz  -- stopped vancomycin, lisinopril and hydrochlorothiazide.  -- Slight improvement in creatinine, renal ultrasound unremarkable, nephrology to see today.      3. Diabetes II with poor control and hyperglycemia due to #1:   -- The patient has not had any insulin for 24 hours leading up to his ED stay, he may have some medical compliance issues.  He admitted that he can miss up to once a week.  -- bedtime lantus to 50 (PTA he takes 40 units).  -- continue high intensity sliding scale insulin    -- continue AM lantus at 20 units   -- he admits he doesn't take metformin   -- had an endocrinologist who moved and hasn't seen for over a year, currently being managed by PA as outpatient.  -- will need outpatient endocrinology consultation.  3.  Hypertension:   -- hold lisinopril/hctz, in setting of YORDY  -- iv hydralazine for sbp > 160   4.  Deep vein thrombosis prophylaxis:  SCDs.   5. Disposition  -- > 2 days    Interval History   Chart reviewed, patient seen.  Back wound being packed by surgeon.  Still some  "back pain at surgical site. No SOB, mild nausea.    -Data reviewed today: I reviewed all new labs and imaging over the last 24 hours. I personally reviewed no images or EKG's today.    Physical Exam    , Blood pressure 151/83, pulse 96, temperature 97.7  F (36.5  C), temperature source Oral, resp. rate 18, height 1.803 m (5' 11\"), weight 92.1 kg (203 lb), SpO2 96 %.  Vitals:    07/17/19 1918 07/20/19 0021   Weight: 90.7 kg (200 lb) 92.1 kg (203 lb)     Vital Signs with Ranges  Temp:  [97.7  F (36.5  C)-99.6  F (37.6  C)] 97.7  F (36.5  C)  Pulse:  [96-99] 96  Resp:  [18-24] 18  BP: (132-151)/(77-83) 151/83  SpO2:  [93 %-97 %] 96 %  I/O's Last 24 hours  I/O last 3 completed shifts:  In: 750 [P.O.:750]  Out: 1300 [Urine:1300]    Constitutional: Awake, alert, cooperative, no apparent distress, not toxic  Respiratory: Clear to auscultation bilaterally, no crackles or wheezing  Cardiovascular: Regular rate and rhythm, normal S1 and S2, and no murmur noted  GI: Normal bowel sounds, soft, non-distended, non-tender  Skin/Integumen: No rashes, no cyanosis, no edema, low back erythema, wound on lower back with mild surrounding erythema  Other:      Medications   All medications were reviewed.      aspirin  81 mg Oral At Bedtime     cyanocobalamin  1,000 mcg Intramuscular Q3 Days     DAPTOmycin (CUBICIN) intermittent infusion  6 mg/kg Intravenous Q24H     insulin aspart  1-10 Units Subcutaneous TID AC     insulin aspart  1-7 Units Subcutaneous At Bedtime     insulin glargine  20 Units Subcutaneous QAM AC     insulin glargine  40 Units Subcutaneous At Bedtime     sodium chloride (PF)  3 mL Intracatheter Q8H     vitamin B complex with vitamin C  1 tablet Oral At Bedtime     vitamin D3  2,000 Units Oral At Bedtime        Data   Recent Labs   Lab 07/20/19  0727 07/20/19  0046 07/19/19  1659 07/19/19  0959 07/18/19  0705 07/17/19  1939   WBC 18.6*  --   --  21.0* 28.4* 23.4*   HGB 13.4  --   --  13.0* 13.8 15.5   MCV 88  --   --  " 89 89 87     --   --  288 260 247    136 135 137 137 133   POTASSIUM 3.5  --  3.5 3.4 3.7 4.0   CHLORIDE 104  --  102 103 103 99   CO2 27  --  25 26 29 27   BUN 49*  --  48* 44* 28 27   CR 2.17* 2.34* 2.41* 2.54* 1.50* 0.99   ANIONGAP 7  --  8 8 5 7   SUE 8.4*  --  8.1* 8.1* 8.2* 8.8   *  --  166* 171* 214* 406*   ALBUMIN 1.9*  --  2.0*  --   --  2.1*   PROTTOTAL  --   --   --   --   --  6.8   BILITOTAL  --   --   --   --   --  0.5   ALKPHOS  --   --   --   --   --  109   ALT  --   --   --   --   --  14   AST  --   --   --   --   --  10   LIPASE  --   --   --   --   --  45*       Recent Results (from the past 24 hour(s))   US Renal Complete    Narrative    ULTRASOUND RETROPERITONEAL COMPLETE 7/19/2019 6:06 PM     HISTORY: Acute kidney injury    COMPARISON: None.    FINDINGS: The bilateral renal parenchyma are normal in echogenicity  without evidence for shadowing stone or mass. No renal cysts. No  hydronephrosis. Right kidney measures 13.2 x 7.0 x 5.7 cm and the left  measures 12.6 x 6.6 x 6.5 cm. Cortical thickness is 1.4 cm on the  right and 1.1 cm on the left. Bladder is unremarkable given its level  of distention.      Impression    IMPRESSION: Negative bilateral renal ultrasound.    MD Miguel Ángel CERVANTES MD  Text Page  (7am to 6pm)

## 2019-07-20 NOTE — CONSULTS
RENAL CONSULTATION NOTE    REFERRING MD:  Miguel Ángel Lee MD    REASON FOR CONSULTATION:  YORDY    HPI:  59 y.o man with diabetes and hypertension, admitted on 7/17 for lower abscess. He had I&D on 7/19 and again at the bedside today. Vancomycin was switched to daptomycin on 7/19 for YORDY. He was admitted with serum creatinine of 0.99 mg/dl and has been trended up since and peaked at 2.54 mg/dl yesterday. Improved to 2.17 mg/dl today. He feels much better after the bedside I&D. No other complaints.     ROS:  A complete review of systems was performed and is negative except as noted above.    PMH:    Past Medical History:   Diagnosis Date     Diabetes (H)      Hypertension        PSH:  History reviewed. No pertinent surgical history.    MEDICATIONS:      aspirin  81 mg Oral At Bedtime     ceFAZolin  1 g Intravenous Q12H     cyanocobalamin  1,000 mcg Intramuscular Q3 Days     insulin aspart  1-10 Units Subcutaneous TID AC     insulin aspart  1-7 Units Subcutaneous At Bedtime     insulin glargine  20 Units Subcutaneous QAM AC     insulin glargine  40 Units Subcutaneous At Bedtime     sodium chloride (PF)  3 mL Intracatheter Q8H     vitamin B complex with vitamin C  1 tablet Oral At Bedtime     vitamin D3  2,000 Units Oral At Bedtime       ALLERGIES:    Allergies as of 07/17/2019 - Reviewed 07/17/2019   Allergen Reaction Noted     Exenatide Rash 07/18/2013     Hmg-coa-r inhibitors Muscle Pain (Myalgia) and Other (See Comments) 07/18/2013     Amlodipine  09/12/2017       FH:  History reviewed. No pertinent family history.    SH:    Social History     Socioeconomic History     Marital status:      Spouse name: Not on file     Number of children: Not on file     Years of education: Not on file     Highest education level: Not on file   Occupational History     Not on file   Social Needs     Financial resource strain: Not on file     Food insecurity:     Worry: Not on file     Inability: Not on file      "Transportation needs:     Medical: Not on file     Non-medical: Not on file   Tobacco Use     Smoking status: Never Smoker     Smokeless tobacco: Never Used   Substance and Sexual Activity     Alcohol use: No     Frequency: Never     Drug use: No     Sexual activity: Not on file   Lifestyle     Physical activity:     Days per week: Not on file     Minutes per session: Not on file     Stress: Not on file   Relationships     Social connections:     Talks on phone: Not on file     Gets together: Not on file     Attends Oriental orthodox service: Not on file     Active member of club or organization: Not on file     Attends meetings of clubs or organizations: Not on file     Relationship status: Not on file     Intimate partner violence:     Fear of current or ex partner: Not on file     Emotionally abused: Not on file     Physically abused: Not on file     Forced sexual activity: Not on file   Other Topics Concern     Not on file   Social History Narrative     Not on file       PHYSICAL EXAM:    /83 (BP Location: Left arm)   Pulse 96   Temp 97.7  F (36.5  C) (Oral)   Resp 18   Ht 1.803 m (5' 11\")   Wt 92.1 kg (203 lb)   SpO2 96%   BMI 28.31 kg/m    GENERAL: pleasant, alert, NAD  HEENT:  Normocephalic. No gross abnormalities.  Pupils equal.  MMM.  Dentition is ok.  CV: RRR, no murmurs, no clicks, gallops, or rubs, some RLE edema.  RESP: Clear bilaterally with good efforts. No wheezes or crackles.  GI: Abdomen obese, soft, NT  MUSCULOSKELETAL: extremities nl - no gross deformities noted. Trace RLE edema  SKIN: no suspicious lesions or rashes, dry to touch  NEURO:  Strength normal and symmetric. A/o x3. Nonfocal.  PSYCH: mood good, affect appropriate  LYMPH: No palpable ant/post cervical    LABS:      CBC RESULTS:     Recent Labs   Lab 07/20/19  0727 07/19/19  0959 07/18/19  0705 07/17/19  1939   WBC 18.6* 21.0* 28.4* 23.4*   RBC 4.44 4.28* 4.49 5.23   HGB 13.4 13.0* 13.8 15.5   HCT 39.1* 37.9* 39.8* 45.3    " 288 260 247       BMP RESULTS:  Recent Labs   Lab 07/20/19  0727 07/20/19  0046 07/19/19  1659 07/19/19  0959 07/18/19  0705 07/17/19  1939    136 135 137 137 133   POTASSIUM 3.5  --  3.5 3.4 3.7 4.0   CHLORIDE 104  --  102 103 103 99   CO2 27  --  25 26 29 27   BUN 49*  --  48* 44* 28 27   CR 2.17* 2.34* 2.41* 2.54* 1.50* 0.99   *  --  166* 171* 214* 406*   SUE 8.4*  --  8.1* 8.1* 8.2* 8.8       INRNo lab results found in last 7 days.     DIAGNOSTICS:  Reviewed    A/P:  59 y.o man admitted for back abscess, consulted for YORDY.     # Pt with normal Scr on admission  # YORDY in the setting of MSSA abscess: Likely not due to vancomycin. Improving.   # MSSS abscess: On Ancef now.  # HTN   -resume lisinopril/HCTZ when renal function returns to baseline    Alex Hardy MD  [facility] - Nephrology  Office Phone: [phone]  Pager: [phone]

## 2019-07-20 NOTE — CONSULTS
[facility]    Infectious Disease Consultation     Date of Admission:  7/17/2019  Date of Consult (When I saw the patient): 07/20/19    Assessment & Plan   Jose Reid is a 59 year old male who was admitted on 7/17/2019.     Impression:  1. 59 y.o male with uncontrolled diabetes, A1c of greater than 15.   2. Admitted with back antibiotics. MSSA in the cultures.   3. S/p I and D.   4. Blood cultures done and are negative.   5. YORDY   6. Was on vanco and zosyn.   7. Switched to Dapto given YORDY and then ancef as cultures with MSSA.     Recommendations:   Agree with ancef   Since blood cultures negative ok to switch to keflex when ready for discharge  Tight blood glucose control       Ana Laura Dean MD    Reason for Consult   Reason for consult: I was asked by Dr. Lee to evaluate this patient for back abscesses     Primary Care Physician   REINIER PINO    Chief Complaint   Back abscesses     History is obtained from the patient and medical records    History of Present Illness   Jose Reid is a 59 year old male with uncontrolled diabetes, admitted with back abscesses and cellulitis, s/p I and D, cultures with Staph aureus.      Past Medical History   I have reviewed this patient's medical history and updated it with pertinent information if needed.   Past Medical History:   Diagnosis Date     Diabetes (H)      Hypertension        Past Surgical History   I have reviewed this patient's surgical history and updated it with pertinent information if needed.  History reviewed. No pertinent surgical history.    Prior to Admission Medications   Prior to Admission Medications   Prescriptions Last Dose Informant Patient Reported? Taking?   NONFORMULARY 7/14/2019 at hs Self Yes Yes   Sig: Take 2 tablets by mouth At Bedtime Magnesium 2 tablets at bedtime Strength unknown   VITAMIN D, CHOLECALCIFEROL, PO 7/16/2019 at hs Self Yes Yes   Sig: Take 2 tablets by mouth At Bedtime strength unknown   aspirin 81  MG EC tablet 7/16/2019 at hs Self Yes Yes   Sig: Take 81 mg by mouth At Bedtime    clindamycin (CLEOCIN) 300 MG capsule 7/17/2019 at x 2 doses Self No Yes   Sig: Take 1 capsule (300 mg) by mouth 3 times daily for 10 days   cyanocobalamin (CYANOCOBALAMIN) 1000 MCG/ML injection 7/15/2019 at hs Self Yes Yes   Sig: Inject 1,000 mcg into the muscle every 3 days    insulin glargine (LANTUS SOLOSTAR PEN) 100 UNIT/ML pen 7/16/2019 at hs Self No Yes   Sig: Inject 50 Units Subcutaneous daily   Patient taking differently: Inject 30-40 Units Subcutaneous At Bedtime    insulin pen needle (31G X 5 MM) 31G X 5 MM miscellaneous  Self No No   Sig: Use 1 pen needles daily or as directed.   lisinopril-hydrochlorothiazide (PRINZIDE/ZESTORETIC) 20-12.5 MG tablet 7/14/2019 at hs Self Yes Yes   Sig: Take 1 tablet by mouth At Bedtime   vitamin B complex with vitamin C (STRESS TAB) tablet 7/14/2019 at hs Self Yes Yes   Sig: Take 1 tablet by mouth At Bedtime      Facility-Administered Medications: None     Allergies   Allergies   Allergen Reactions     Exenatide Rash     Hmg-Coa-R Inhibitors Muscle Pain (Myalgia) and Other (See Comments)     Extreme Fatigue       Amlodipine      Other reaction(s): Dizziness  Dizzy         Immunization History   Immunization History   Administered Date(s) Administered     Tdap (Adacel,boostrix) 12/01/2009, 07/18/2013       Social History   I have reviewed this patient's social history and updated it with pertinent information if needed. Jose Reid  reports that he has never smoked. He has never used smokeless tobacco. He reports that he does not drink alcohol or use drugs.    Family History   I have reviewed this patient's family history and updated it with pertinent information if needed.   History reviewed. No pertinent family history.    Review of Systems   The 10 point Review of Systems is negative other than noted in the HPI or here.     Physical Exam   Temp: 97.7  F (36.5  C) Temp src: Oral BP:  151/83 Pulse: 96   Resp: 18 SpO2: 96 % O2 Device: None (Room air)    Vital Signs with Ranges  Temp:  [97.7  F (36.5  C)-99.6  F (37.6  C)] 97.7  F (36.5  C)  Pulse:  [96-99] 96  Resp:  [18-24] 18  BP: (132-151)/(77-83) 151/83  SpO2:  [93 %-97 %] 96 %  203 lbs 0 oz  Body mass index is 28.31 kg/m .    GENERAL APPEARANCE:  alert and no distress  EYES: Eyes grossly normal to inspection, PERRL and conjunctivae and sclerae normal  HENT: ear canals and TM's normal and nose and mouth without ulcers or lesions  NECK: no adenopathy, no asymmetry, masses, or scars and thyroid normal to palpation  RESP: lungs clear to auscultation - no rales, rhonchi or wheezes  CV: regular rates and rhythm, normal S1 S2, no S3 or S4 and no murmur, click or rub  LYMPHATICS: normal ant/post cervical and supraclavicular nodes  ABDOMEN: soft, nontender, without hepatosplenomegaly or masses and bowel sounds normal  MS: extremities normal- no gross deformities noted  SKIN: no suspicious lesions or rashes      Data   Lab Results   Component Value Date    WBC 18.6 (H) 07/20/2019    HGB 13.4 07/20/2019    HCT 39.1 (L) 07/20/2019     07/20/2019     07/20/2019    POTASSIUM 3.5 07/20/2019    CHLORIDE 104 07/20/2019    CO2 27 07/20/2019    BUN 49 (H) 07/20/2019    CR 2.17 (H) 07/20/2019     (H) 07/20/2019    AST 10 07/17/2019    ALT 14 07/17/2019    ALKPHOS 109 07/17/2019    BILITOTAL 0.5 07/17/2019     Recent Labs   Lab 07/17/19 2100 07/17/19 1951 07/17/19 1939   CULT Moderate growth  Staphylococcus aureus  *  Susceptibility testing done on previous specimen No growth after 3 days  Moderate growth  Staphylococcus aureus  * No growth after 3 days     Recent Labs   Lab Test 07/17/19 2100 07/17/19 1951 07/17/19 1939   CULT Moderate growth  Staphylococcus aureus  *  Susceptibility testing done on previous specimen No growth after 3 days  Moderate growth  Staphylococcus aureus  * No growth after 3 days       Amount of time  performed on this consult: 45 minutes. This includes face to face assessment and care coordination with the primary team.

## 2019-07-21 NOTE — PROGRESS NOTES
Surgery Progress Note    59M admitted with back abscess s/p &D urgent care 7/16.  Worsened required repeat I&D Lopez 7/19, packed with betadine soaked nugauze.     Patient remains AFVSS.   Awake alert oriented interactive appropriate  WBC 24>18, labs pending this morning  See below for wound details.    Procedure:  Dressing change performed to midline low back wound.    Packing removed.    Wound is ~5 x 1.5 cm at skin with about 0.5 cm undermining superiorly inferiorly and 1 cm undermining right and left lateral.    Base and edges with fibrinous exudate.  Improved erythema/celluitis surrounding wound.  Only a small amount of pus could be expressed today (drops versus ~TBSPs from yesterday)  Packed with betadine soaked kerlix.  Overlying gauze 4x4s secured with tape.  Patient tolerated well with one dose IV dilaudid.    A/Plan:  Wound improved from yesterday.  -- Ok for diet.  No plans or operative debridement today.  -- Will reassess at dressing change tomorrow.  If continues to go in right direction could discharge to home as soon as tomorrow.  Wife prior RN, so she could change every other day.  She requested home nursing for some days.  Appreciate coordinator assistance.  -- Continue IV antibiotics.  Could convert to PO tomorrow. Could consider 7-10 day course given extent of infection, but defer to ID/Medicine team.  Drainage theoretically adequate for source control.  -- Ok to replace overlying gauze pad and tape as needed for ongoing drainage.    Discussed with Dr. Chino Vazquez, PGY4 General Surgery Resident

## 2019-07-21 NOTE — PROGRESS NOTES
[facility]  Hospitalist Progress Note  Britney Padilla MD  07/21/2019    Assessment & Plan   ASSESSMENT AND PLAN:  Mr. Reid is a 59-year-old male with a past medical history significant for diabetes and hypertension who presents to the Emergency Department with worsening cellulitis and abscess.   1. POD #2  Back abscess with surrounding cellulitis - suspect from poor diabetic control.  -- seen by general surgery, to OR with further I and D 7/19  -- repacked on 7/20.  Surgery to see again today to consider further debridement  -- oxycodone for pain  -- was treated with vancomycin and Zosyn but switched to daptomycin 7/19 due to renal failure.  --Cultures showing MSSA, switched to Ancef 7/20.  Can discharge on Keflex    2. YORDY  -- prior to admission had normal kidney function  -- rapid rise in creatinine likely due to vancomycin in setting of diabetic kidney on lisinonpril/hctz  -- stopped vancomycin, lisinopril and hydrochlorothiazide.  -- Nephrology following, creatinine improving.    -- Hold off on restarting ACE-I and hydrochlorothiazide until creatinine stablizes      3. Diabetes II with poor control and hyperglycemia due to #1:   -- The patient has not had any insulin for 24 hours leading up to his ED stay, he may have some medical compliance issues.  He admitted that he can miss up to once a week.  -- bedtime lantus to 50 (PTA he takes 40 units).  -- continue high intensity sliding scale insulin    -- continue AM lantus at 20 units   -- he admits he doesn't take metformin   -- had an endocrinologist who moved and hasn't seen for over a year, currently being managed by PA as outpatient.  -- will need outpatient endocrinology consultation.  3.  Hypertension:   -- hold lisinopril/hctz, in setting of YORDY  -- iv hydralazine for sbp > 160   4.  Deep vein thrombosis prophylaxis:  SCDs.   5. Disposition  Possible further debridement today.  If creatinine continues to improve he can likely discharge  "tomorrow with outpatient followup.    Interval History   Back pain improved.  Feels a little run down.  Denies n/v, SOB.   No CP.    -Data reviewed today: I reviewed all new labs and imaging over the last 24 hours. I personally reviewed no images or EKG's today.    Physical Exam    , Blood pressure 151/84, pulse 94, temperature 96.7  F (35.9  C), temperature source Oral, resp. rate 18, height 1.803 m (5' 11\"), weight 93.1 kg (205 lb 3.2 oz), SpO2 96 %.  Vitals:    07/17/19 1918 07/20/19 0021 07/21/19 0033   Weight: 90.7 kg (200 lb) 92.1 kg (203 lb) 93.1 kg (205 lb 3.2 oz)     Vital Signs with Ranges  Temp:  [96.7  F (35.9  C)-98.6  F (37  C)] 96.7  F (35.9  C)  Pulse:  [94-98] 94  Resp:  [18-20] 18  BP: (151-159)/(84-92) 151/84  SpO2:  [95 %-98 %] 96 %  I/O's Last 24 hours  I/O last 3 completed shifts:  In: 1830 [P.O.:1830]  Out: 2250 [Urine:2250]    Constitutional: Awake, alert, cooperative, no apparent distress, not toxic  Respiratory: Clear to auscultation bilaterally, no crackles or wheezing  Cardiovascular: Regular rate and rhythm, normal S1 and S2, and no murmur noted  GI: Normal bowel sounds, soft, non-distended, non-tender  Skin/Integumen: No rashes, no cyanosis, no edema, low back erythema, wound on lower back with mild surrounding erythema  Other:      Medications   All medications were reviewed.      aspirin  81 mg Oral At Bedtime     ceFAZolin  2 g Intravenous Q12H     cyanocobalamin  1,000 mcg Intramuscular Q3 Days     insulin aspart  1-10 Units Subcutaneous TID AC     insulin aspart  1-7 Units Subcutaneous At Bedtime     insulin glargine  20 Units Subcutaneous QAM AC     insulin glargine  40 Units Subcutaneous At Bedtime     sodium chloride (PF)  3 mL Intracatheter Q8H     vitamin B complex with vitamin C  1 tablet Oral At Bedtime     vitamin D3  2,000 Units Oral At Bedtime        Data   Recent Labs   Lab 07/21/19  0715 07/20/19  0727 07/20/19  0046 07/19/19  1659 07/19/19  0959 07/18/19  0705 " 07/17/19 1939   WBC  --  18.6*  --   --  21.0* 28.4* 23.4*   HGB  --  13.4  --   --  13.0* 13.8 15.5   MCV  --  88  --   --  89 89 87   PLT  --  307  --   --  288 260 247   NA  --  138 136 135 137 137 133   POTASSIUM  --  3.5  --  3.5 3.4 3.7 4.0   CHLORIDE  --  104  --  102 103 103 99   CO2  --  27  --  25 26 29 27   BUN  --  49*  --  48* 44* 28 27   CR 1.87* 2.17* 2.34* 2.41* 2.54* 1.50* 0.99   ANIONGAP  --  7  --  8 8 5 7   SUE  --  8.4*  --  8.1* 8.1* 8.2* 8.8   GLC  --  160*  --  166* 171* 214* 406*   ALBUMIN  --  1.9*  --  2.0*  --   --  2.1*   PROTTOTAL  --   --   --   --   --   --  6.8   BILITOTAL  --   --   --   --   --   --  0.5   ALKPHOS  --   --   --   --   --   --  109   ALT  --   --   --   --   --   --  14   AST  --   --   --   --   --   --  10   LIPASE  --   --   --   --   --   --  45*       No results found for this or any previous visit (from the past 24 hour(s)).    Miguel Ángel Lee MD  Text Page  (7am to 6pm)

## 2019-07-22 NOTE — PROGRESS NOTES
Surgery    Pt sitting up at bedside.  No complaints.   Pain well controlled    B/P: 143/84, T: 97.2, P: 90, R: 16  Back: low posterior back - Dressing and packing remove.d Open wound with surrounding holly wound erythema. Slough noted in wound base and sides. Wound clean.   Wound repacked with moistened fluff dressing and dry gauze to cover, secured in place with tape    A/p: Back wound with resolving cellulititis  - change wound packing BID  - Wife present and can do some changes, but would like home health to assess and do changes if covered by insurance  - ok to discharge home today with wound cares BID  - home with 7 days of oral antibiotics -keflex  - follow up with Dr Lopez in 1 week    Efife Zarco PA-C

## 2019-07-22 NOTE — PROGRESS NOTES
" Renal Medicine Progress Note            Assessment/Plan:     59 y.o man admitted for back abscess, consulted for YORDY.      # Pt with normal Scr on admission   Improving. # YORDY in the setting of MSSA abscess: Prerenal. Continues to improve.   # MSSS abscess: On Ancef now.  # HTN               -resume lisinopril/HCTZ when renal function returns to baseline    Plan  # Resume lisinopril/HCTZ when YORDY resolved  # Renal panel in AM  # Please call us with any questions or concerns. I am signing off. Thank you.       Interval History:     Continues to improve. Afebrile. VSS. Kidney function continues to improve.           Medications and Allergies:       aspirin  81 mg Oral At Bedtime     ceFAZolin  2 g Intravenous Q8H     cyanocobalamin  1,000 mcg Intramuscular Q3 Days     insulin aspart  1-10 Units Subcutaneous TID AC     insulin aspart  1-7 Units Subcutaneous At Bedtime     insulin glargine  20 Units Subcutaneous QAM AC     insulin glargine  40 Units Subcutaneous At Bedtime     sodium chloride (PF)  3 mL Intracatheter Q8H     vitamin B complex with vitamin C  1 tablet Oral At Bedtime     vitamin D3  2,000 Units Oral At Bedtime        Allergies   Allergen Reactions     Exenatide Rash     Hmg-Coa-R Inhibitors Muscle Pain (Myalgia) and Other (See Comments)     Extreme Fatigue       Amlodipine      Other reaction(s): Dizziness  Dizzy              Physical Exam:   Vitals were reviewed   , Blood pressure (!) 159/93, pulse 98, temperature 96.4  F (35.8  C), temperature source Oral, resp. rate 16, height 1.803 m (5' 11\"), weight 93.1 kg (205 lb 3.2 oz), SpO2 94 %.    Wt Readings from Last 3 Encounters:   07/21/19 93.1 kg (205 lb 3.2 oz)   05/02/19 91.4 kg (201 lb 9.6 oz)   03/01/19 86.1 kg (189 lb 12.8 oz)       Intake/Output Summary (Last 24 hours) at 7/21/2019 2154  Last data filed at 7/21/2019 1852  Gross per 24 hour   Intake 1540 ml   Output 1900 ml   Net -360 ml       GENERAL APPEARANCE: pleasant, NAD, alert  HEENT:  " Eyes/ears/nose/neck grossly normal  RESP: lungs cta b c good efforts, no crackles, rhonchi or wheezes  CV: RRR, nl S1/S2, no m/r/g   ABDOMEN: o/s/nt/nd, bs present  EXTREMITIES/SKIN: no rashes/lesions on observed skin; + edema  Neuro: a/ox 3, non-focal.         Data:     CBC RESULTS:     Recent Labs   Lab 07/20/19  0727 07/19/19  0959 07/18/19  0705 07/17/19 1939   WBC 18.6* 21.0* 28.4* 23.4*   RBC 4.44 4.28* 4.49 5.23   HGB 13.4 13.0* 13.8 15.5   HCT 39.1* 37.9* 39.8* 45.3    288 260 247       Basic Metabolic Panel:  Recent Labs   Lab 07/21/19  0715 07/20/19  0727 07/20/19  0046 07/19/19  1659 07/19/19  0959 07/18/19  0705 07/17/19 1939   NA  --  138 136 135 137 137 133   POTASSIUM  --  3.5  --  3.5 3.4 3.7 4.0   CHLORIDE  --  104  --  102 103 103 99   CO2  --  27  --  25 26 29 27   BUN  --  49*  --  48* 44* 28 27   CR 1.87* 2.17* 2.34* 2.41* 2.54* 1.50* 0.99   GLC  --  160*  --  166* 171* 214* 406*   SUE  --  8.4*  --  8.1* 8.1* 8.2* 8.8       INRNo lab results found in last 7 days.   Attestation:   I have reviewed today's relevant vital signs, notes, medications, labs and imaging.    Alex Hardy MD  [facility] - Nephrology  Office phone :[phone]  Pager: [phone]

## 2019-07-22 NOTE — DISCHARGE SUMMARY
[facility]    Discharge Summary  Hospitalist    Date of Admission:  7/17/2019  Date of Discharge:  7/22/2019  Discharging Provider: Hans Lazaro  Date of Service (when I saw the patient): 07/22/19    History of Present Illness   Mr. Reid is a 59-year-old male with a past medical history significant for diabetes and hypertension who presents to the Emergency Department with worsening cellulitis and abscess of the back.  History is obtained through discussion with the ED physician as well as the patient and his wife at bedside.  The patient has now had some sort of back discomfort and swelling for approximately 8 days.  He does not recall any trauma but did note that he has had what he describes as hives periodically that he thinks may have started after he initiated insulin a couple of months ago and he may have scratched an area of his back which started this infection.  He noted an area on his mid lower back that was inflamed and was uncomfortable and over time grew larger and also became erythematous.  His PCP was out of town, so he went to urgent care yesterday where they drained the abscess with reported 30 mL of purulent fluid.  He was started on clindamycin.  There was an outline drawn of the erythematous area and the wife today noted that the erythema extends beyond the borders on the left side.  The patient had some reaccumulation of his fluid and generally was feeling unwell.  The patient noted that he was febrile last evening but took some Advil and was not as febrile this morning, but otherwise felt fatigued, a little nauseated, and generally unwell.  The pain in his back is worsened compared to prior.  Her blood sugars have also been out of control in the 400s though he did miss his insulin last evening.  With all this, the patient came to the ED where he had a repeat I and D done with additional drainage of purulent material.  He was started on vancomycin and Zosyn and a  fluid culture was sent.  White count noted to be elevated to 23.  The remainder of his labs are unremarkable save for a blood sugar of over 400.      When I saw the patient, he was resting fairly comfortably and was feeling better after getting pain medication.  He does not have any nausea currently.  Again, does not recall any trauma otherwise to his back other than scratching the skin.  He does get pimples on his back relatively frequently.  The patient also tells me that his blood sugars have historically been fairly difficult to control as he was diagnosed with type 2 diabetes, a number of years ago and was started on a typical oral medications for this but was not having improvement of his blood sugars.  He was at one point told that he was diabetes 1.5 and most recently told he was diabetes 1 and started on insulin.  Blood sugars recently have been in the low 200s, which is a substantial improvement from where he was prior.  Most recent hemoglobin A1c was in May and noted to be 14.4, improved from a greater than 15 in March.     Hospital Course   Jose Reid was admitted on 7/17/2019.  The following problems were addressed during his hospitalization:    Mr. Reid is a 59-year-old male with a past medical history significant for diabetes and hypertension who presents to the Emergency Department with worsening cellulitis and abscess.     Sepsis from back abscess with surrounding cellulitis - suspect from poor diabetic control.  -- seen by general surgery, to OR with further I and D 7/19  -- repacked on 7/20.  -- Close outpatient follow up.   -- oxycodone for pain  -- was treated with vancomycin and Zosyn but switched to daptomycin 7/19 due to renal failure.  --Cultures showing MSSA, switched to Ancef 7/20.    -- Discharged on augmentin.      YORDY  -- prior to admission had normal kidney function  -- rapid rise in creatinine likely due to vancomycin in setting of diabetic kidney on lisinonpril/hctz  --  stopped vancomycin, lisinopril and hydrochlorothiazide.  -- Nephrology follow up. Discussed with patient.   -- Hold off on restarting ACE-I and hydrochlorothiazide until creatinine stablizes       Diabetes II with poor control and hyperglycemia due to #1:   -- The patient had not had any insulin for 24 hours leading up to his ED stay, he may have some medical compliance issues.  He admitted that he can miss up to once a week.  -- bedtime lantus   -- continue sliding scale insulin    -- he admits he doesn't take metformin   -- had an endocrinologist who moved and hasn't seen for over a year, per report.   -- will need outpatient endocrinology consultation.    Hypertension:   -- hold lisinopril/hctz, in setting of YORDY        Pending Results   These results will be followed up by PCP  Unresulted Labs Ordered in the Past 30 Days of this Admission     Date and Time Order Name Status Description    7/17/2019 1939 Blood culture Preliminary     7/17/2019 1939 Blood culture Preliminary           Code Status   Full Code       Primary Care Physician   REINIER PINO    Physical Exam   Temp: 97.2  F (36.2  C) Temp src: Oral BP: 143/84 Pulse: 90 Heart Rate: 88 Resp: 16 SpO2: 96 % O2 Device: None (Room air)    Vitals:    07/20/19 0021 07/21/19 0033 07/22/19 0245   Weight: 92.1 kg (203 lb) 93.1 kg (205 lb 3.2 oz) 92 kg (202 lb 12.8 oz)     Vital Signs with Ranges  Temp:  [96.4  F (35.8  C)-97.2  F (36.2  C)] 97.2  F (36.2  C)  Pulse:  [90-98] 90  Heart Rate:  [88] 88  Resp:  [16-17] 16  BP: (143-159)/(81-93) 143/84  SpO2:  [94 %-96 %] 96 %  I/O last 3 completed shifts:  In: 1540 [P.O.:1540]  Out: 800 [Urine:800]    GENERAL: Alert and oriented. NAD. Conversational, appropriate.   HEENT: Normocephalic. EOMI. No icterus or injection. Nares normal.   LUNGS: Clear to auscultation. No dyspnea at rest.   HEART: Regular rate. Extremities perfused.   ABDOMEN: Soft, nontender, and nondistended. Positive bowel sounds.   BACK: Dressing in  place, c/d/i.   EXTREMITIES: No LE edema noted.   NEUROLOGIC: Moves extremities x4 on command. No acute focal neurologic abnormalities noted.       Discharge Disposition   Discharged to home  Condition at discharge: Stable    Consultations This Hospital Stay   PHARMACY TO DOSE VANCO  SURGERY GENERAL IP CONSULT  WOUND OSTOMY CONTINENCE NURSE  IP CONSULT  NEPHROLOGY IP CONSULT  INFECTIOUS DISEASES IP CONSULT    Time Spent on this Encounter   I, Hans Lazaro, personally saw the patient today and spent greater than 30 minutes discharging this patient.    Discharge Orders      Home care nursing referral      Reason for your hospital stay    Wound     Follow-up and recommended labs and tests     Follow up with primary care provider, REINIER PINO, within 7 days for hospital follow- up.  The following labs/tests are recommended: cbc/bmp.    Follow up with Surgery as directed.   Follow up with wound care as directed.     Activity    Your activity upon discharge: activity as tolerated     MD face to face encounter    Documentation of Face to Face and Certification for Home Health Services    I certify that patient: Jose Reid is under my care and that I, or a nurse practitioner or physician's assistant working with me, had a face-to-face encounter that meets the physician face-to-face encounter requirements with this patient on: 7/22/2019.    This encounter with the patient was in whole, or in part, for the following medical condition, which is the primary reason for home health care: back abscess and wound.    I certify that, based on my findings, the following services are medically necessary home health services: Nursing.    My clinical findings support the need for the above services because: Nurse is needed: For complex aftercare of surgical procedures because the patient needs instruction and cannot perform care on their own due to: back abscess and wound...    Further, I certify that my clinical  findings support that this patient is homebound (i.e. absences from home require considerable and taxing effort and are for medical reasons or [employer] services or infrequently or of short duration when for other reasons) because: Requires assistance of another person or specialized equipment to access medical services because patient: Requires supervision of another for safe transfer...    Based on the above findings. I certify that this patient is confined to the home and needs intermittent skilled nursing care, physical therapy and/or speech therapy.  The patient is under my care, and I have initiated the establishment of the plan of care.  This patient will be followed by a physician who will periodically review the plan of care.  Physician/Provider to provide follow up care: Marcial Larry    Attending hospital physician (the Medicare certified [place] provider): Hans Lazaro  Physician Signature: See electronic signature associated with these discharge orders.  Date: 7/22/2019     Full Code     Diet    Follow this diet upon discharge: Orders Placed This Encounter      Moderate Consistent CHO Diet     Discharge Medications   Current Discharge Medication List      START taking these medications    Details   amoxicillin-clavulanate (AUGMENTIN) 500-125 MG tablet Take 1 tablet by mouth 2 times daily for 10 days  Qty: 20 tablet, Refills: 0    Associated Diagnoses: Back abscess      insulin aspart (NOVOLOG VIAL) 100 UNITS/ML vial Pre-Meal 140-189 =1 unit, 190-239 =2 units, 240-289 =3 units, 290-339 =4 units, = or >340 =5 units   Bedtime 200-239 =1 units, 240-289 =1.5 units, 290-339 =2 units, = or >340 give 2.5 units  Qty: 10 mL, Refills: 0    Associated Diagnoses: Back abscess         CONTINUE these medications which have CHANGED    Details   insulin glargine (LANTUS PEN) 100 UNIT/ML pen Inject 40 Units Subcutaneous At Bedtime  Qty: 3 mL, Refills: 1    Associated Diagnoses: Back abscess         CONTINUE  these medications which have NOT CHANGED    Details   aspirin 81 MG EC tablet Take 81 mg by mouth At Bedtime   Qty: 90 tablet, Refills: 3      cyanocobalamin (CYANOCOBALAMIN) 1000 MCG/ML injection Inject 1,000 mcg into the muscle every 3 days       vitamin B complex with vitamin C (STRESS TAB) tablet Take 1 tablet by mouth At Bedtime      VITAMIN D, CHOLECALCIFEROL, PO Take 2 tablets by mouth At Bedtime strength unknown      insulin pen needle (31G X 5 MM) 31G X 5 MM miscellaneous Use 1 pen needles daily or as directed.  Qty: 100 each, Refills: 1    Associated Diagnoses: Type 2 diabetes mellitus without complication, unspecified whether long term insulin use (H)         STOP taking these medications       clindamycin (CLEOCIN) 300 MG capsule Comments:   Reason for Stopping:         lisinopril-hydrochlorothiazide (PRINZIDE/ZESTORETIC) 20-12.5 MG tablet Comments:   Reason for Stopping:         NONFORMULARY Comments:   Reason for Stopping:             Allergies   Allergies   Allergen Reactions     Exenatide Rash     Hmg-Coa-R Inhibitors Muscle Pain (Myalgia) and Other (See Comments)     Extreme Fatigue       Amlodipine      Other reaction(s): Dizziness  Dizzy       Data   Most Recent 3 CBC's:  Recent Labs   Lab Test 07/20/19  0727 07/19/19  0959 07/18/19  0705   WBC 18.6* 21.0* 28.4*   HGB 13.4 13.0* 13.8   MCV 88 89 89    288 260      Most Recent 3 BMP's:  Recent Labs   Lab Test 07/22/19  0741 07/21/19  0715 07/20/19  0727 07/20/19  0046 07/19/19  1659     --  138 136 135   POTASSIUM 3.5  --  3.5  --  3.5   CHLORIDE 111*  --  104  --  102   CO2 25  --  27  --  25   BUN 39*  --  49*  --  48*   CR 1.76* 1.87* 2.17* 2.34* 2.41*   ANIONGAP 7  --  7  --  8   SUE 8.5  --  8.4*  --  8.1*   *  --  160*  --  166*     Most Recent 2 LFT's:  Recent Labs   Lab Test 07/17/19  1939 07/03/12 1948   AST 10 16.0   ALT 14  --    ALKPHOS 109  --    BILITOTAL 0.5  --      Most Recent INR's and Anticoagulation Dosing  History:  Anticoagulation Dose History     There is no flowsheet data to display.        Most Recent 3 Troponin's:No lab results found.  Most Recent Cholesterol Panel:  Recent Labs   Lab Test 03/01/19  1050   CHOL 285*   *   HDL 41   TRIG 272*     Most Recent 6 Bacteria Isolates From Any Culture (See EPIC Reports for Culture Details):  Recent Labs   Lab Test 07/17/19  2100 07/17/19  1951 07/17/19  1939   CULT Moderate growth  Staphylococcus aureus  *  Susceptibility testing done on previous specimen No growth after 5 days  Moderate growth  Staphylococcus aureus  * No growth after 5 days     Most Recent TSH, T4 and A1c Labs:  Recent Labs   Lab Test 05/02/19  1326 03/01/19  1050   TSH  --  2.12   A1C 14.4* >15.0*     Results for orders placed or performed during the hospital encounter of 07/17/19   US Renal Complete    Narrative    ULTRASOUND RETROPERITONEAL COMPLETE 7/19/2019 6:06 PM     HISTORY: Acute kidney injury    COMPARISON: None.    FINDINGS: The bilateral renal parenchyma are normal in echogenicity  without evidence for shadowing stone or mass. No renal cysts. No  hydronephrosis. Right kidney measures 13.2 x 7.0 x 5.7 cm and the left  measures 12.6 x 6.6 x 6.5 cm. Cortical thickness is 1.4 cm on the  right and 1.1 cm on the left. Bladder is unremarkable given its level  of distention.      Impression    IMPRESSION: Negative bilateral renal ultrasound.    MYKE DOLL MD

## 2019-07-24 NOTE — TELEPHONE ENCOUNTER
Patient/family was instructed to return call to [facility] RN.  Provided RN line, but instructed it is down right now, so he can call the  at [phone] as well and say he's returning a call to a nurse.    Nohemi Garcia, RN

## 2019-07-24 NOTE — TELEPHONE ENCOUNTER
This patient was discharged from [facility] on MON 22-JUL-2019.    Discharge Diagnosis: Cellulitis And Abscess Of Trunk, Back Abscess    Follow-up instructions: 7 days    A follow-up visit has been scheduled.  08/05/19    Number of ED/ER visits in the last 12 months:  1     Please follow-up with patient.    Thank you,  Sharlene CRENSHAW    NE Team [place]

## 2019-07-29 NOTE — PROGRESS NOTES
Patient presents in follow-up after recent hospitalization for back abscess with cellulitis that failed minor incision and drainage.  He required additional incision and drainage in the hospital.  He states since the time of discharge she is been doing well.  He said no fevers or chills.  He is doing daily wet-to-dry dressing changes on his back wound.  He is continuing to take Augmentin.    On examination: The wound is much smaller in size and shows evidence of granulation tissue within it.  There is however some fibrinous adherence that was sharply removed.  There is no significant tunneling.  There is some continued reactive redness around the wound but not what I would consider overt cellulitis.    This time I recommend continue wet-to-dry dressing changes.  I requested that they follow-up again in 2 to 3 weeks.    Please route or send letter to:  Primary Care Provider (PCP)

## 2019-07-30 NOTE — TELEPHONE ENCOUNTER
No return call after a week, and patient had surgery follow-up exam with general surgery yesterday, so will close encounter at this time.    Nohemi Garcia RN

## 2019-07-31 PROBLEM — I10 HTN (HYPERTENSION): Status: ACTIVE | Noted: 2019-01-01

## 2019-07-31 NOTE — TELEPHONE ENCOUNTER
Justina, from oral surgery, reports patient has a BP of 209/111 and is asymptomatic.    Justina is wondering if they should bring the BP down with laughing gas or postpone surgery.    Bear Chung RN

## 2019-07-31 NOTE — TELEPHONE ENCOUNTER
Huddled with Dr. Strange.      Patient should postpone procedure. Justina from oral surgery should call [phone] if BP does not go down.  Patient needs a BP f/u with PCP prior to next appointment for oral procedure.    Justina verbalized understanding of above recommendations and will give patient message.     PCP has been managing HTN.  Added HTN to problem list.     Bear Chung RN

## 2019-08-05 NOTE — PROGRESS NOTES
Subjective     Jose Reid is a 59 year old male who presents to clinic today for the following health issues:    HPI       Hospital Follow-up Visit:    Hospital/Nursing Home/IP Rehab Facility: [facility]  Date of Admission: 07/17/2019  Date of Discharge: 07/22/2019  Reason(s) for Admission: Back abscess             Problems taking medications regularly: Lisinopril is not helping patient.        Medication changes since discharge: Patient started on Augmentin 500-125 for 10 days and started on insulin aspart 100 units/ml. Patient stopped on lisinopril-hydrochlorothiazide 10-12.5 mg.        Problems adhering to non-medication therapy:  None    Discharge summary  Hospital Course     Jose Reid was admitted on 7/17/2019.  The following problems were addressed during his hospitalization:     Mr. Reid is a 59-year-old male with a past medical history significant for diabetes and hypertension who presents to the Emergency Department with worsening cellulitis and abscess.      Sepsis from back abscess with surrounding cellulitis - suspect from poor diabetic control.  -- seen by general surgery, to OR with further I and D 7/19  -- repacked on 7/20.  -- Close outpatient follow up.   -- oxycodone for pain  -- was treated with vancomycin and Zosyn but switched to daptomycin 7/19 due to renal failure.  --Cultures showing MSSA, switched to Ancef 7/20.    -- Discharged on augmentin.      YORDY  -- prior to admission had normal kidney function  -- rapid rise in creatinine likely due to vancomycin in setting of diabetic kidney on lisinonpril/hctz  -- stopped vancomycin, lisinopril and hydrochlorothiazide.  -- Nephrology follow up. Discussed with patient.   -- Hold off on restarting ACE-I and hydrochlorothiazide until creatinine stablizes       Diabetes II with poor control and hyperglycemia due to #1:   -- The patient had not had any insulin for 24 hours leading up to his ED stay, he may have some medical  compliance issues.  He admitted that he can miss up to once a week.  -- bedtime lantus   -- continue sliding scale insulin    -- he admits he doesn't take metformin   -- had an endocrinologist who moved and hasn't seen for over a year, per report.   -- will need outpatient endocrinology consultation.     Hypertension:   -- hold lisinopril/hctz, in setting of YORDY    Summary of hospitalization:  [facility] discharge summary reviewed  Diagnostic Tests/Treatments reviewed.  Follow up needed: none  Other Healthcare Providers Involved in Patient s Care:         None  Update since discharge: improved.     Post Discharge Medication Reconciliation: discharge medications reconciled, continue medications without change.  Plan of care communicated with patient     Coding guidelines for this visit:  Type of Medical   Decision Making Face-to-Face Visit       within 7 Days of discharge Face-to-Face Visit        within 14 days of discharge   Moderate Complexity [id number]   High Complexity [id number]            Patient Active Problem List   Diagnosis     Type 2 diabetes, HbA1c goal < 7% (H)     Hyperlipidemia with target LDL less than 100     Obesity     Back abscess     HTN (hypertension)     Past Surgical History:   Procedure Laterality Date     INCISION AND DRAINAGE, ABSCESS, SIMPLE N/A 7/19/2019    Procedure: INCISION AND DRAINAGE OF BACK ABSCESS;  Surgeon: Richard Lopez MD;  Location: [facility]       Social History     Tobacco Use     Smoking status: Never Smoker     Smokeless tobacco: Never Used   Substance Use Topics     Alcohol use: No     Frequency: Never     History reviewed. No pertinent family history.          Reviewed and updated as needed this visit by Provider         Review of Systems   ROS COMP: Constitutional, HEENT, cardiovascular, pulmonary, gi and gu systems are negative, except as otherwise noted.      Objective    BP (!) 160/102 (BP Location: Right arm, Patient Position: Chair, Cuff Size:  "Adult Regular)   Pulse 82   Temp 98.4  F (36.9  C) (Oral)   Ht 1.803 m (5' 11\")   Wt 91.4 kg (201 lb 9.6 oz)   BMI 28.12 kg/m    Body mass index is 28.12 kg/m .  Physical Exam   GENERAL: healthy, alert and no distress  NECK: no adenopathy, no asymmetry, masses, or scars and thyroid normal to palpation  RESP: lungs clear to auscultation - no rales, rhonchi or wheezes  CV: regular rate and rhythm, normal S1 S2, no S3 or S4, no murmur, click or rub, no peripheral edema and peripheral pulses strong  MS: Swelling of the right ankle, chronic, otherwise no gross musculoskeletal defects noted, no edema  SKIN: no suspicious lesions or rashes    Diagnostic Test Results:  Labs reviewed in Epic        Assessment & Plan     (I10) Hypertension goal BP (blood pressure) < 140/90  (primary encounter diagnosis)  Comment:   Plan: Basic metabolic panel  (Ca, Cl, CO2, Creat,         Gluc, K, Na, BUN), ENDOCRINOLOGY ADULT         REFERRAL, metoprolol succinate ER (TOPROL-XL)         50 MG 24 hr tablet        Elevated. Failures on multiple BP meds. Hydrochlorothiazide, ACE - wants to avoid due to kidney injury and perceived lack of benefit. Failed amlodipine, wants to consider alternative class. Will start beta blocker. See nephrology.     (N17.9) Acute kidney injury (H)  Comment:   Plan: Recheck labs. Probably setting of sepsis and start of IV antibiotics. They held his Lisinopril. Will continue holding     (K08.89) Pain, dental  Comment:   Plan: Needs 2 teeth pulled but unable to due to high BPs each time. 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 This is not a clear diagnosis. He may have some kind of type 1 / 1.5 per review of hospital notes. Will defer / refer him to endocrinology at a future date.     (L03.90) Cellulitis, unspecified cellulitis site  Comment:   Plan: CBC with platelets and differential, ESR:         Erythrocyte sedimentation rate, IgA, Lactic         acid whole blood, CANCELED: Lactic acid        Improving. Check labs today - improving   Lab Results   Component Value Date    WBC 10.9 08/05/2019        (M25.571) Pain in joint, ankle and foot, right  Comment: Swelling in right ankle for months since a bad sprain   Plan: PODIATRY/FOOT & ANKLE SURGERY REFERRAL        Refer to podiatry.      Marcial Larry, [place], PA-C

## 2019-08-07 NOTE — TELEPHONE ENCOUNTER
"Routing request for refill of insulin aspart to PCP, as it doesn't appear has been prescribed by you before. Med pended, will need to be printed, signed and faxed. Appears was newly prescribed during recent hospitalization. Their note pasted below.  Last visit with you was 8/5/19. Your note pasted below hospital note.     Esequielhart sent.  Nohemi Garcia, RN     Hospital note:  \"Diabetes II with poor control and hyperglycemia due to #1:   -- The patient had not had any insulin for 24 hours leading up to his ED stay, he may have some medical compliance issues.  He admitted that he can miss up to once a week.  -- bedtime lantus   -- continue sliding scale insulin    -- he admits he doesn't take metformin   -- had an endocrinologist who moved and hasn't seen for over a year, per report.   -- will need outpatient endocrinology consultation.\"    PCP note 8/5/19:  (E11.9,  Z79.4) Type 2 diabetes mellitus without complication, with long-term current use of insulin (H)  Comment:   Plan: Hemoglobin A1c, ENDOCRINOLOGY ADULT REFERRAL                Lab Results   Component Value Date     A1C 12.6 08/05/2019      He is now on pre-meal insulin and basal. This is not a clear diagnosis. He may have some kind of type 1 / 1.5 per review of hospital notes. Will defer / refer him to endocrinology at a future date.     "

## 2019-08-07 NOTE — TELEPHONE ENCOUNTER
Reason for Call:  Medication or medication refill:    Do you use a [facility]?  Name of the pharmacy and phone number for the current request:     [facility] [address] [place] [address]    Name of the medication requested: Novalog    Other request: Prachi from [facility] called in stating they need clarification on the dosage of patient's insulin. She states for insurance they need to know the maximum daily limit/ how many units per day the patient would be using. Please call to clarify.     Can we leave a detailed message on this number? YES    Phone number patient can be reached at: [phone]    Best Time: any     Call taken on 8/7/2019 at 4:39 PM by Blanca Mccarthy

## 2019-08-08 NOTE — TELEPHONE ENCOUNTER
Please find out how much he's been using max a day so we can add that - okay to give pharmacy verbal on amount that he notices.  Thanks.  Marcial Larry, MPAS, PA-C

## 2019-08-08 NOTE — TELEPHONE ENCOUNTER
Patient/family was instructed to return call to [facility] RN directly on the RN Call back line at [phone].     Tayler Cano RN

## 2019-08-08 NOTE — TELEPHONE ENCOUNTER
Routing below message to PCP.  Is the daily maximum amount of Novolog 15 units?    Bear Chung RN

## 2019-08-13 NOTE — TELEPHONE ENCOUNTER
3rd attempt    Patient/family was instructed to return call to [facility] RN directly on the RN Call back line at [phone].     Tayler Cano RN

## 2019-08-14 NOTE — TELEPHONE ENCOUNTER
[employer] faxed Clarification Needed re: insulin aspart (NOVOLOG PEN) 100 UNIT/ML pen    Pharmacy Notes to Prescriber:  NEED MAX DAILY UNITS

## 2019-08-14 NOTE — TELEPHONE ENCOUNTER
Patient/family was instructed to return call to [facility] RN directly on the RN Call back line at [phone].         Please see other TE encounter dated 8/7. Pharmacy made aware we have attempted calling patient 4x with no reply.       Bear Chung RN      .

## 2019-08-15 NOTE — TELEPHONE ENCOUNTER
Patient/family was instructed to return call to [facility] RN directly on the RN Call back line at [phone].     Tayler Cano RN

## 2019-08-19 PROBLEM — I50.9 CHF EXACERBATION (H): Status: ACTIVE | Noted: 2019-01-01

## 2019-08-19 NOTE — TELEPHONE ENCOUNTER
Patient was seen in clinic today.  Per request from Rashmi Lester RN, I asked patient how many units of Novalog he uses a day.  Patient stated that he uses 2 units on average per day.    Ludmila Vazquez, Certified Medical Assistant (AAMA)

## 2019-08-19 NOTE — PATIENT INSTRUCTIONS
"Thank you for choosing [facility]!    DR. KUMAR'S CLINIC LOCATIONS     MONDAY - [place] WEDNESDAY (AM ONLY) - [place]   [address] [place] [address] [address] [address]   [phone] / [phone] / [phone]       THURSDAY - [place] SCHEDULE SURGERY: [phone]   [address] APPOINTMENTS: [phone]   [place] [address] BILLING QUESTIONS: [phone] / [phone]         DIABETES AND YOUR FEET  Diabetes can result in several problems in the feet including ulcers (open sores) and amputations. Two of the most important reasons why people develop foot problems when they have diabetes is : 1. Neuropathy (loss of feeling)  2. Vascular disease (loss or decrease of blood flow).    Neuropathy is a term used to describe a loss of nerve function.  Patients with diabetes are at risk of developing neuropathy if their sugars continue to run high and are above the normal value. One theory for neuropathy is that the \"extra\" sugar in the body enters the nerves and is broken down. These by-products build up in the nerve causing it to swell and impairing nerve function. Often times, this can be prevented by controlling your sugars, dieting and exercise.    When a person develops neuropathy, they usually begin to feel numbness or tingling in their feet and sometime in their legs.  Other symptoms may include painful burning or hot feet, tingling or feeling like insects or ants are crawling on your feet or legs.  If the diabetes is sever and the sugars run high for long periods of time, neuropathy can also occur in the hands.    Vascular disease  is a term used to describe a loss or decrease in circulation (blood flow). There is a problem in getting blood and oxygen to areas that need it. Similar to neuropathy, sugars can build up in the walls of the arteries (blood vessels) and cause them to become swollen, " thickened and hardened. This decreases the amount of blood that can go to an area that needs it. Though this is common in the legs of diabetic patients, it can also affect other arteries (blood vessels) in the body such as in the heart and eyes.    In the legs, vascular disease usually results in cramping. Patients who develop leg cramps after walking the same distance every time (i.e. One block, half a mile, ect.) need to let their doctors know so that their circulation may be checked. Cramps causing severe pain in the feet and/or legs while sleeping and the cramps go away when you stand or hang your legs off the side of the bed, may also be a sign of poor blood circulation.  Occasional cramping in cold weather or on rare occasions with activity may not be due to poor circulation, but you should inform your doctor.    PREVENTION OF THESE DISEASES  The key to prevention is good blood sugar control. Poor blood sugar control is a big reason many of these problems start. Physical activity (exercise) is a very good way to help decrease your blood sugars. Exercise can lower your blood sugar, blood pressure, and cholesterol. It also reduces your risk for heart disease and stroke, relieves stress, and strengthens your heart, muscles and bones.  In addition, regular activity helps insulin work better, improves your blood circulation, and keeps your joints flexible. If you're trying to lose weight, a combination of exercise and wise food choices can help you reach your target weight and maintain it.      PAIN MANAGEMENT  1.Blood Sugar Control - Most important  2. Medications such as:  Amytriptylline, duloxetine, gabapentin, lyrica, tramadol  3. Nutritional therapy:  Vitamin B6 (100mg daily), Vitamin B12 (75mcg daily), Vitamin D 2000 IU daily), Alpha-Lipoic Acid (600-1800mg daily), Acetyl-L-Carnitine (500-1000mg TID, L-methyl folate (1500mcg daily)    ** Metformin can block Vitamin B6 and B12 so it is important to  supplement**    FOOT CARE RECOMMENDATIONS   1. Wash your feet with lukewarm water and a mild soap and then dry them thoroughly, especially between the toes.     2. Examine your feet daily looking for cuts, corns, blisters, cracks, ect, especially after wearing new shoes. Make sure to look between your toes. If you cannot see the bottom of your feet, set a mirror on the floor and hold your foot over it, or ask a spouse, friend or family member to examine your feet for you. Contact your doctor immediately if new problems are noted or if sores are not healing.     3. Immediately apply moisturizer to the tops and bottoms of your feet, avoiding areas between the toes. Hand lotion (Intesive Care, Marita, Eucerin, Neutrogena, Curel, ect) is sufficient unless your doctor prescribes a medicated lotion. Apply sunscreen to your feet when going swimming outside.     4. Use clean comfortable shoes, wear white socks (if you have any bleeding or drainage, you will see it on white socks). Socks should not have thick seams or cut off the circulation around the leg. Break in new shoes slowly and rotate with older shoes until broken in. Check the inside of your shoes with your hand to look for areas of irritation or objects that may have fallen into your shoes.       5. Keep slippers by the side of your bed for use during the night.     6.  Shoes should be fitted by a professional and should not cause areas of irritation.  Check your feet regularly when wearing a new pair of shoes and replace them as needed.     7.  Talk to your doctor about proper exercise. Exercise and stretching stimulate blood flow to your feet and maintain proper glucose levels.     8.  Monitor your blood glucose level as instructed by your doctor. Notify your doctor immediately if your blood sugar is abnormally high or low.    9. Cut your nails straight across, but then gently round any sharp edges with a cardboard nail file. If you have neuropathy, peripheral  vascular disease or cannot see that well to trim your own toenails contact [employer] ([phone]) or [employer] ([phone]).      THINGS TO AVOID DOING   1.  Do not soak your feet if you have an open sore. Use only lukewarm water and always check the temperature with your hand as hot water can easily burn your feet.       2.  Never use a hot water bottle or heating pad on your feet. Also do not apply cold compresses to your feet. With decreased sensation, you could burn or freeze your feet.       3.  Do not apply any of these to your feet:    -  Over the counter medicine for corns or warts    -  Harsh chemicals like boric acid    -  Do not self-treat corns, cuts, blisters or infections. Always consult your doctor.       4.  Do not wear sandals, slippers or walk barefoot, especially on hot sand or concrete or other harsh surfaces.     5.  If you smoke, stop!!!        [place] [facility] LOCATIONS  [facility]  [address]  [place] [address]  Phone: [phone]  Fax: [phone] [facility]  [address]  [place] [address]  Phone: [phone]   Fax: [phone]   [facility]  [address]  [place] [address]  Phone: [phone]  Fax: [phone] [facility]  [address]  [place] [address]  Phone: [phone]   Fax: [phone]   [facility]   [address]  [place] [address]  Phone: [phone]  Fax: [phone] * Please call any location listed to make an appointment for a casting/fitting. Your referral was sent to their central office and they will all have the order on file.     WEARING YOUR CUSTOM FOOT ORTHOTICS   Most insurance plans cover one pair of orthotics per year. You must check with your   insurance plan to see what your payment responsibility will be. Please call your   insurance company by calling the  number on the back of your insurance card.   Orthotic's are non-refundable and non-returnable.   Orthotics are made of various designs. Some orthotics are covered with material that extends beyond your toes. If your orthotic is of this design, you will likely need to trim the toe end to get a proper fit. The insole from your shoe can be used as a template. Simply overlay the shoe insert on top of the custom orthotic. Align the heel end while tracing the length of the insert onto the custom orthotic. Use a large scissor to trim the toe end until you get a proper fit in the shoe.   The orthotic needs to be pushed as far back in the shoe as possible. The heel portion should not ride forward so as not to irritate your heel.   Orthotics are designed to work with socks. Excessive perspiration will shorten the life span of the orthotics. Remove the orthotic from the shoe frequently for proper drying.   The break-in period lasts for weeks. People new to orthotics will likely experience new aches and pains. The orthotic is forcing your foot into a new position. Arch, foot and leg muscle aches and fatigue are common during these weeks. Minor discomfort can be considered normal break in phenomenon. Start wearing your orthotic around your home your first day. Limited activity for one to two hours is recommended. You can increase one or two additional hours each day provided the aches and pains are subsiding. The degree of discomfort, fatigue and problems will dictate the speed of break in. You may require multiple weeks to work up to full time use.   Do not continue wearing your orthotics if they are creating problems such as blisters or sores. Do not hesitate to call the clinic to speak with a nurse regarding orthotic   break in, fit, trimming, etc. You may also need to see the doctor if the orthotics are   simply not working out. Adjustments are sometimes made to improve orthotic   function.     Orthotics will only work in  certain styles and types of shoes. Orthotics rarely work in dress shoes. Slip-ons, clogs, sandals and heels are particularly troublesome. Specially designed orthotics may be necessary for these types of shoes. Your custom orthotic was designed for activities that require appropriate walking or running shoes. Lace up athletic shoes, walking shoes or work boots should work appropriately. You may need a wider or longer shoe. Shoes with a removable  or insert work best. In general, you want to remove an insert from the shoe before placing the orthotic into the shoe. Shoes without a removable liner may not work as well.     When purchasing new shoes, bring your orthotics along to get a proper fit. Shop at stores that are familiar with orthotics.   Frequent washing of the orthotic may shorten the life span of the top cover. The top cover can be replaced but will generally last one to five years depending on use and foot perspiration.           FYI: BODY WEIGHT AND YOUR FEET  The following information is included in the after visit summary for all patients. Body weight can be a sensitive issue to discuss in clinic, but we think the following information is very important. Although we focus on the feet and ankles, we do support the overall health of our patients.     Many things can cause foot and ankle problems. Foot structure, activity level, foot mechanics and injuries are common causes of pain. One very important issue that often goes unmentioned, is body weight. Extra weight can cause increased stress on muscles, ligaments, bones and tendons. Sometimes just a few extra pounds is all it takes to put one over her/his threshold. Without reducing that stress, it can be difficult to alleviate pain. As Foot & Ankle specialists, our job is addressing the lower extremity problem and possible causes. Regarding extra body weight, we encourage patients to discuss diet and weight management plans with their primary care  doctors. It is this team approach that gives you the best opportunity for pain relief and getting you back on your feet.      [facility] has a Comprehensive Weight Management Program. This program includes counseling, education, non-surgical and surgical approaches to weight loss. If you are interested in learning more either talk to you primary care provider or call [phone] Garcia to follow up with Primary Care provider regarding elevated blood pressure.

## 2019-08-19 NOTE — TELEPHONE ENCOUNTER
Spoke to pharmacist.  They need to know the maximum amount of insulin per day.  Since sliding scale states BG > 340 = 5 units prior to meals, then max dose is 15 units per day.      Bear Chung RN

## 2019-08-19 NOTE — PROGRESS NOTES
Subjective     Jose Reid is a 59 year old male who presents to clinic today for the following health issues:    HPI     4 weeks ago Jose was admitted for cellulitis and sepsis resulting in an acute kidney injury and blood pressure challenges. He was discharged and has mostly had an uneventful course.    This past 2 weeks however, he's had shortness of breath, increasing fatigue, feeling weak. He's had what sounds like PND and mild orthopnea. He has had a dry cough this past few days, not productive. He feels more tired and weak this past few days than he did the previous 2 weeks. He has a mild  Temperature increase today.     He has not had chest pain. No pleuritic discomfort but feels he cannot get a full breath.    He's been having edema in both legs that is mild, L>R  But he sometimes has left leg swelling due to issues related to an old injury.    Patient Active Problem List   Diagnosis     Type 2 diabetes, HbA1c goal < 7% (H)     Hyperlipidemia with target LDL less than 100     Obesity     Back abscess     HTN (hypertension)      Current Outpatient Medications   Medication     aspirin 81 MG EC tablet     cyanocobalamin (CYANOCOBALAMIN) 1000 MCG/ML injection     insulin aspart (NOVOLOG PEN) 100 UNIT/ML pen     insulin glargine (LANTUS PEN) 100 UNIT/ML pen     insulin pen needle (31G X 5 MM) 31G X 5 MM miscellaneous     LORazepam (ATIVAN) 1 MG tablet     metoprolol succinate ER (TOPROL-XL) 50 MG 24 hr tablet     order for DME     vitamin B complex with vitamin C (STRESS TAB) tablet     VITAMIN D, CHOLECALCIFEROL, PO     No current facility-administered medications for this visit.             How many servings of fruits and vegetables do you eat daily?  4 or more    On average, how many sweetened beverages do you drink each day (soda, juice, sweet tea, etc)?   0    How many days per week do you miss taking your medication? 0        Patient Active Problem List   Diagnosis     Type 2 diabetes, HbA1c goal < 7%  "(H)     Hyperlipidemia with target LDL less than 100     Obesity     Back abscess     HTN (hypertension)     Past Surgical History:   Procedure Laterality Date     INCISION AND DRAINAGE, ABSCESS, SIMPLE N/A 7/19/2019    Procedure: INCISION AND DRAINAGE OF BACK ABSCESS;  Surgeon: Richard Lopez MD;  Location:  GI       Social History     Tobacco Use     Smoking status: Never Smoker     Smokeless tobacco: Never Used   Substance Use Topics     Alcohol use: No     Frequency: Never     History reviewed. No pertinent family history.      Reviewed and updated as needed this visit by Provider  Problems         Review of Systems   ROS COMP: Constitutional, HEENT, cardiovascular, pulmonary, gi and gu systems are negative, except as otherwise noted.      Objective    BP (!) 162/108 (Cuff Size: Adult Large)   Pulse 104   Temp 99.4  F (37.4  C) (Oral)   Ht 1.803 m (5' 11\")   Wt 93 kg (205 lb)   BMI 28.59 kg/m    Body mass index is 28.59 kg/m .  Physical Exam   GENERAL: healthy, alert and no distress  HENT: ear canals and TM's normal, nose and mouth without ulcers or lesions  NECK: no adenopathy, no asymmetry, masses, or scars and thyroid normal to palpation  RESP: lungs clear to auscultation - no rales, rhonchi or wheezes  CV: regular rate and rhythm, normal S1 S2, no S3 or S4, no murmur, click or rub, no peripheral edema and peripheral pulses strong  ABDOMEN: soft, nontender, no hepatosplenomegaly, no masses and bowel sounds normal  MS: no gross musculoskeletal defects noted, no edema  NEURO: Lower extremities are weak especially with foot extension / flexion strength is 2/5 bilateral/symmetric   SKIN: no suspicious lesions or rashes  NEURO: Normal strength and tone, mentation intact and speech normal    Diagnostic Test Results:  EKG - flat T waves, low voltage, tachy, no Q waves or ST segment changes    Results for orders placed or performed in visit on 08/19/19   CBC with platelets   Result Value Ref Range    " WBC 11.3 (H) 4.0 - 11.0 10e9/L    RBC Count 4.88 4.4 - 5.9 10e12/L    Hemoglobin 14.9 13.3 - 17.7 g/dL    Hematocrit 44.8 40.0 - 53.0 %    MCV 92 78 - 100 fl    MCH 30.5 26.5 - 33.0 pg    MCHC 33.3 31.5 - 36.5 g/dL    RDW 15.6 (H) 10.0 - 15.0 %    Platelet Count 247 150 - 450 10e9/L   ESR: Erythrocyte sedimentation rate   Result Value Ref Range    Sed Rate 18 0 - 20 mm/h      X ray - atelectasis vs pulmonary edema vs?        Assessment & Plan       ICD-10-CM    1. Shortness of breath R06.02 XR Chest 2 Views     EKG 12-lead complete w/read - Clinics     CBC with platelets     ESR: Erythrocyte sedimentation rate   2. Cough R05 XR Chest 2 Views     EKG 12-lead complete w/read - Clinics     CBC with platelets     ESR: Erythrocyte sedimentation rate   3. Weak R53.1 XR Chest 2 Views     EKG 12-lead complete w/read - Clinics     CBC with platelets     ESR: Erythrocyte sedimentation rate   4. Other fatigue R53.83 XR Chest 2 Views     EKG 12-lead complete w/read - Clinics     CBC with platelets     ESR: Erythrocyte sedimentation rate   5. Hypertension goal BP (blood pressure) < 140/90 I10 XR Chest 2 Views     EKG 12-lead complete w/read - Clinics     CBC with platelets     ESR: Erythrocyte sedimentation rate   6. Type 2 diabetes mellitus with complication, with long-term current use of insulin (H) E11.8     Z79.4       Reviewed  2+ weeks of weakness, fatigue, shortness of breath in the setting of uncontrolled diabetes and a recent illness/sepsis issue.     PERC score is 4-5 (Tachy, pulse ox is around 94%, over 50, recent illness/hospital visit, reported edema L>R leg). Also slightly abnormal EKG - none to compare to. Needs a PE rule out - refer to ER today. They agree to drive him there by private car. Could be infection, pneumonia, failure, etc. Needs more emergent labs than I can order.     Follow up as needed - needs better BP management. Might need neuro evaluation to determine a cause for his LE weakness - will defer  those less urgent issues until we get this acute issue sorted out.    Marcial Larry, MPAS, PA-C

## 2019-08-19 NOTE — LETTER
Transition Communication Hand-off for Care Transitions to Next Level of Care Provider    Name: Jose Reid  : 1960  MRN #: [id number]  Primary Care Provider: MARCIAL LARRY     Primary Clinic: [address] [facility] [address]     Reason for Hospitalization:  Elevated troponin [R74.8]  Acute on chronic congestive heart failure, unspecified heart failure type (H) [I50.9]  Admit Date/Time: 2019  6:51 PM  Discharge Date: 19  Payor Source: Payor: COMMERCIAL / Plan: [employer]ERCIAL / Product Type: HMO /     Readmission Assessment Measure (EDWIN) Risk Score/category: Average           Reason for Communication Hand-off Referral: Admission diagnoses: CHF    Discharge Plan: home with outpt follow-up       Concern for non-adherence with plan of care:   Y/N : yes  Discharge Needs Assessment:  Needs      Most Recent Value   Equipment Currently Used at Home  cane, straight          Already enrolled in Tele-monitoring program and name of program:  no  Follow-up specialty is recommended: Yes    Follow-up plan:    Future Appointments   Date Time Provider Department Center   2019  8:00 AM Oneil Ttaum, OT [facility]   2019  3:30 PM [facility]   2019  8:30 AM DO LAB [facility]   2019  9:30 AM Ludmila Soares PA-C [facility]   2019 10:20 AM Marcial Larry PA-C [facility]   10/3/2019  1:00 PM Marcial Larry PA-C [facility]   10/17/2019  9:20 AM Marcial Larry PA-C [facility]       Any outstanding tests or procedures:        Referrals     Future Labs/Procedures    Follow-Up with cardiac sub-specialty clinic             Key Recommendations:  Pt is a new CORE enrollment with cardiology. New meds started and adjusted while here. A1C is elevated but improved from previous result.     Alethea Colin RN    AVS/Discharge Summary is the source of truth; this is a helpful guide for improved communication of patient  story

## 2019-08-19 NOTE — LETTER
8/19/2019         RE: Jose Reid  [address]  [facility] [id number]        Dear Colleague,    Thank you for referring your patient, Jose Reid, to the [facility]. Please see a copy of my visit note below.      ASSESSMENT/PLAN:    Encounter Diagnoses   Name Primary?     Edema of right ankle Yes     Type 2 diabetes, HbA1c goal < 7% (H)      Polyneuropathy associated with underlying disease (H)      Bilateral foot-drop      His edema in the right lower extremity is likely venous insufficiency. The right LE in general is more edematous.  Also considered is a posterior tibial tendon dysfunction (no pain due to neuropathy), early ankle arthritis, and Charcot ankle. I do not have a high suspicion for the latter at this time.    (M25.471) Edema of right ankle  (primary encounter diagnosis)  Plan: order for DME/ compression stockings    (M21.371,  M21.372) Bilateral foot-drop  Comment: I am not sure why he has weakness in bilateral anterior muscle compartments. He is unsure.  Plan: NEUROLOGY ADULT REFERRAL          Pt is instructed to inspect feet daily, wear a supportive shoe at all times, and avoid walking barefoot.  He is instructed to call if any sores develop.   Importance of good blood sugar control emphasized.   Pt verbalized understanding.    Jose Reid is referred to [facility] for prescription orthoses.      He is to follow up if the right ankle is becoming more swollen and/or if pain develops.     Body mass index is 28.31 kg/m .    Weight management plan: Patient was referred to their PCP to discuss a diet and exercise plan.      Raf Kelley DPM, FACFAS, MS    [facility]      ____________________________________________________________________    HPI:       I was asked by Marcial Larry PA-C to evaluate this patient, in consultation, regarding right ankle pain.     Chief Complaint: swollen  ankle, right  Onset of problem: 6 months  Pain/ discomfort is described as:  throbbing  Ratin/10 at worst   Frequency:  intermittent    The pain is made worse with walking long distances  Previous treatment: he said that X-ray was done at [facility]. No findings to explain the edema.   Type 2 DM; Last Hgb A1C = 12.6.  *  Patient Active Problem List   Diagnosis     Type 2 diabetes, HbA1c goal < 7% (H)     Hyperlipidemia with target LDL less than 100     Obesity     Back abscess     HTN (hypertension)   *  *  Past Surgical History:   Procedure Laterality Date     INCISION AND DRAINAGE, ABSCESS, SIMPLE N/A 2019    Procedure: INCISION AND DRAINAGE OF BACK ABSCESS;  Surgeon: Richard Lopez MD;  Location: [facility]   *  *  Current Outpatient Medications   Medication Sig Dispense Refill     aspirin 81 MG EC tablet Take 81 mg by mouth At Bedtime  90 tablet 3     cyanocobalamin (CYANOCOBALAMIN) 1000 MCG/ML injection Inject 1,000 mcg into the muscle every 3 days        insulin aspart (NOVOLOG PEN) 100 UNIT/ML pen Pre-Meal 140-189 =1 unit, 190-239 =2 units, 240-289 =3 units, 290-339 =4 units, = or >340 =5 units 27 mL 0     insulin glargine (LANTUS PEN) 100 UNIT/ML pen Inject 40 Units Subcutaneous At Bedtime 3 mL 1     insulin pen needle (31G X 5 MM) 31G X 5 MM miscellaneous Use 1 pen needles daily or as directed. 100 each 1     LORazepam (ATIVAN) 1 MG tablet 1 tablet prior to procedure 5 tablet 1     metoprolol succinate ER (TOPROL-XL) 50 MG 24 hr tablet Take 1 tablet (50 mg) by mouth daily 90 tablet 0     vitamin B complex with vitamin C (STRESS TAB) tablet Take 1 tablet by mouth At Bedtime       VITAMIN D, CHOLECALCIFEROL, PO Take 2 tablets by mouth At Bedtime strength unknown         ROS:     A 10-point review of systems was performed and is positive for that noted above in the HPI and as seen below.  All other areas are negative.     Numbness in feet?  yes   Calf pain with walking? no  Recent foot/ankle injury?  "Fell on ice and snow, 3/1/19  Weight change over past 12 months? no  Self perception as overweight? no  Recent flu-like symptoms? no  Joint pain other than feet ? Left upper leg    Social History: Employment:  ;  Exercise/Physical activity:  no;  Tobacco use:  no  Social History     Socioeconomic History     Marital status:      Spouse name: Not on file     Number of children: Not on file     Years of education: Not on file     Highest education level: Not on file   Occupational History     Not on file   Social Needs     Financial resource strain: Not on file     Food insecurity:     Worry: Not on file     Inability: Not on file     Transportation needs:     Medical: Not on file     Non-medical: Not on file   Tobacco Use     Smoking status: Never Smoker     Smokeless tobacco: Never Used   Substance and Sexual Activity     Alcohol use: No     Frequency: Never     Drug use: No     Sexual activity: Not on file   Lifestyle     Physical activity:     Days per week: Not on file     Minutes per session: Not on file     Stress: Not on file   Relationships     Social connections:     Talks on phone: Not on file     Gets together: Not on file     Attends Denominational service: Not on file     Active member of club or organization: Not on file     Attends meetings of clubs or organizations: Not on file     Relationship status: Not on file     Intimate partner violence:     Fear of current or ex partner: Not on file     Emotionally abused: Not on file     Physically abused: Not on file     Forced sexual activity: Not on file   Other Topics Concern     Not on file   Social History Narrative     Not on file       Family history:  No family history on file.    Rheumatoid arthritis:  no  Foot Problems: no  Diabetes: no      EXAM:    Vitals: BP (!) 150/108   Ht 1.803 m (5' 11\")   Wt 92.1 kg (203 lb)   BMI 28.31 kg/m     BMI: Body mass index is 28.31 kg/m .  Height: 5' 11\"    Constitutional/ general:  Pt is in no apparent " distress, appears well-nourished.  Cooperative with history and physical exam.     Diabetic foot exam: see below    Vascular:  Pedal pulses are palpable bilaterally for both the DP and PT arteries.  CFT < 3 sec.  Pedal hair growth noted.     Right ankle edema; more generalized right lower extremity edema.     Neuro:  Alert and oriented x 3. Coordinated gait.  Light touch sensation is intact to the L4, L5, S1 distributions. No obvious deficits.  No evidence of neurological-based weakness, spasticity, or contracture in the lower extremities.     Protective sensation is absent bilateral foot per [place] Monofilament testing.    Derm: Normal texture and turgor.  No erythema, ecchymosis, or cyanosis.  No open lesions.     Musculoskeletal:    Lower extremity muscle strength is diminished for the bilateral anterior leg compartments. Patient is ambulatory without an assistive device or brace .  Decrease in medial longitudinal arch with weight bearing.  Pain on palpation: none. Smooth right ankle ROM.  No pain with stressing the right posterior tibial tendon.                Again, thank you for allowing me to participate in the care of your patient.        Sincerely,        Raf Kelley DPM

## 2019-08-19 NOTE — PROGRESS NOTES
ASSESSMENT/PLAN:    Encounter Diagnoses   Name Primary?     Edema of right ankle Yes     Type 2 diabetes, HbA1c goal < 7% (H)      Polyneuropathy associated with underlying disease (H)      Bilateral foot-drop      His edema in the right lower extremity is likely venous insufficiency. The right LE in general is more edematous.  Also considered is a posterior tibial tendon dysfunction (no pain due to neuropathy), early ankle arthritis, and Charcot ankle. I do not have a high suspicion for the latter at this time.    (M25.471) Edema of right ankle  (primary encounter diagnosis)  Plan: order for DME/ compression stockings    (M21.371,  M21.372) Bilateral foot-drop  Comment: I am not sure why he has weakness in bilateral anterior muscle compartments. He is unsure.  Plan: NEUROLOGY ADULT REFERRAL          Pt is instructed to inspect feet daily, wear a supportive shoe at all times, and avoid walking barefoot.  He is instructed to call if any sores develop.   Importance of good blood sugar control emphasized.   Pt verbalized understanding.    Jose Reid is referred to [facility] for prescription orthoses.      He is to follow up if the right ankle is becoming more swollen and/or if pain develops.     Body mass index is 28.31 kg/m .    Weight management plan: Patient was referred to their PCP to discuss a diet and exercise plan.      Raf Kelley DPM, FACFAS, MS    [facility]le [facility]      ____________________________________________________________________    HPI:       I was asked by Marcial Larry PA-C to evaluate this patient, in consultation, regarding right ankle pain.     Chief Complaint: swollen ankle, right  Onset of problem: 6 months  Pain/ discomfort is described as:  throbbing  Ratin/10 at worst   Frequency:  intermittent    The pain is made worse with walking long distances  Previous treatment: he said that X-ray was done at [facility]. No findings  to explain the edema.   Type 2 DM; Last Hgb A1C = 12.6.  *  Patient Active Problem List   Diagnosis     Type 2 diabetes, HbA1c goal < 7% (H)     Hyperlipidemia with target LDL less than 100     Obesity     Back abscess     HTN (hypertension)   *  *  Past Surgical History:   Procedure Laterality Date     INCISION AND DRAINAGE, ABSCESS, SIMPLE N/A 7/19/2019    Procedure: INCISION AND DRAINAGE OF BACK ABSCESS;  Surgeon: Richard Lopez MD;  Location: [facility]   *  *  Current Outpatient Medications   Medication Sig Dispense Refill     aspirin 81 MG EC tablet Take 81 mg by mouth At Bedtime  90 tablet 3     cyanocobalamin (CYANOCOBALAMIN) 1000 MCG/ML injection Inject 1,000 mcg into the muscle every 3 days        insulin aspart (NOVOLOG PEN) 100 UNIT/ML pen Pre-Meal 140-189 =1 unit, 190-239 =2 units, 240-289 =3 units, 290-339 =4 units, = or >340 =5 units 27 mL 0     insulin glargine (LANTUS PEN) 100 UNIT/ML pen Inject 40 Units Subcutaneous At Bedtime 3 mL 1     insulin pen needle (31G X 5 MM) 31G X 5 MM miscellaneous Use 1 pen needles daily or as directed. 100 each 1     LORazepam (ATIVAN) 1 MG tablet 1 tablet prior to procedure 5 tablet 1     metoprolol succinate ER (TOPROL-XL) 50 MG 24 hr tablet Take 1 tablet (50 mg) by mouth daily 90 tablet 0     vitamin B complex with vitamin C (STRESS TAB) tablet Take 1 tablet by mouth At Bedtime       VITAMIN D, CHOLECALCIFEROL, PO Take 2 tablets by mouth At Bedtime strength unknown         ROS:     A 10-point review of systems was performed and is positive for that noted above in the HPI and as seen below.  All other areas are negative.     Numbness in feet?  yes   Calf pain with walking? no  Recent foot/ankle injury? Fell on ice and snow, 3/1/19  Weight change over past 12 months? no  Self perception as overweight? no  Recent flu-like symptoms? no  Joint pain other than feet ? Left upper leg    Social History: Employment:  ;  Exercise/Physical activity:  no;  Tobacco  "use:  no  Social History     Socioeconomic History     Marital status:      Spouse name: Not on file     Number of children: Not on file     Years of education: Not on file     Highest education level: Not on file   Occupational History     Not on file   Social Needs     Financial resource strain: Not on file     Food insecurity:     Worry: Not on file     Inability: Not on file     Transportation needs:     Medical: Not on file     Non-medical: Not on file   Tobacco Use     Smoking status: Never Smoker     Smokeless tobacco: Never Used   Substance and Sexual Activity     Alcohol use: No     Frequency: Never     Drug use: No     Sexual activity: Not on file   Lifestyle     Physical activity:     Days per week: Not on file     Minutes per session: Not on file     Stress: Not on file   Relationships     Social connections:     Talks on phone: Not on file     Gets together: Not on file     Attends [employer] service: Not on file     Active member of club or organization: Not on file     Attends meetings of clubs or organizations: Not on file     Relationship status: Not on file     Intimate partner violence:     Fear of current or ex partner: Not on file     Emotionally abused: Not on file     Physically abused: Not on file     Forced sexual activity: Not on file   Other Topics Concern     Not on file   Social History Narrative     Not on file       Family history:  No family history on file.    Rheumatoid arthritis:  no  Foot Problems: no  Diabetes: no      EXAM:    Vitals: BP (!) 150/108   Ht 1.803 m (5' 11\")   Wt 92.1 kg (203 lb)   BMI 28.31 kg/m    BMI: Body mass index is 28.31 kg/m .  Height: 5' 11\"    Constitutional/ general:  Pt is in no apparent distress, appears well-nourished.  Cooperative with history and physical exam.     Diabetic foot exam: see below    Vascular:  Pedal pulses are palpable bilaterally for both the DP and PT arteries.  CFT < 3 sec.  Pedal hair growth noted.     Right ankle edema; " more generalized right lower extremity edema.     Neuro:  Alert and oriented x 3. Coordinated gait.  Light touch sensation is intact to the L4, L5, S1 distributions. No obvious deficits.  No evidence of neurological-based weakness, spasticity, or contracture in the lower extremities.     Protective sensation is absent bilateral foot per [place] Monofilament testing.    Derm: Normal texture and turgor.  No erythema, ecchymosis, or cyanosis.  No open lesions.     Musculoskeletal:    Lower extremity muscle strength is diminished for the bilateral anterior leg compartments. Patient is ambulatory without an assistive device or brace .  Decrease in medial longitudinal arch with weight bearing.  Pain on palpation: none. Smooth right ankle ROM.  No pain with stressing the right posterior tibial tendon.

## 2019-08-19 NOTE — ED TRIAGE NOTES
SOB starting about 1 week ago. Pt reports being seen in clinic for f/u after back surgery that was in July. Had EKG and blood work done at office and sent to ED for further eval.

## 2019-08-19 NOTE — ED PROVIDER NOTES
History     Chief Complaint:  shortness of breath, cough     HPI   Jose Reid is a 59 year old male, with a history of type 2 diabetes, hypertension, and hyperlipidemia, who presents with cough and shortness of breath. The patient states that he had a back abscess surgery approximately a week ago. He notes that he was septic at that time, but has had no difficulties since surgery and discharge. Today, the patient had a scheduled follow up visit with his surgeon for month follow up visit, but there he had a fever of 99.4. The patient had EKG, chest XR, and blood work performed. He was referred to the ED for further evaluation and treatment for potential PE or DVT. Here, the patient states that he has felt mildly short of breath this past week as well as had a cough. He initially thought it was due to allergies. He denies any abdominal pain. He does not have a history of DVT or PE.     PE/DVT Risk Factors:  The patient denies a personal or family history of PE, DVT, or other clotting disorders. The patient denies any recent travel or other prolonged immobilization. The patient denies tobacco use or hormone use.      Allergies:  Exenatide  Hmg-Coa-R Inhibitors  Amlodipine  Hctz [Hydrochlorothiazide]      Medications:    Aspirin  Novolog   Lantus  Ativan  Metoprolol    Past Medical History:    Diabetes type 2  Hypertension  Back abscess  Hyperlipidemia     Past Surgical History:    Incision and drainage, abscess, simple     Family History:    No past pertinent family history.     Social History:  Presents with his wife.  Negative for alcohol use.  Negative for tobacco use.   Marital Status:   [2]     Review of Systems   Constitutional: Positive for fever.   Respiratory: Positive for cough and shortness of breath.    Gastrointestinal: Negative for abdominal pain.   All other systems reviewed and are negative.      Physical Exam   First Vitals:  BP: (!) 183/118  Pulse: 110  Temp: 98.7  F (37.1  C)  Resp:  "16  Height: 180.3 cm (5' 11\")  Weight: 92.1 kg (203 lb)  SpO2: 96 %    Patient Vitals for the past 24 hrs:   BP Temp Temp src Pulse Heart Rate Resp SpO2 Height Weight   08/19/19 2100 (!) 163/117 -- -- 107 100 29 94 % -- --   08/19/19 2051 -- -- -- -- 103 23 -- -- --   08/19/19 1927 -- -- -- -- 105 21 92 % -- --   08/19/19 1901 -- -- -- -- 108 23 93 % -- --   08/19/19 1844 (!) 183/118 98.7  F (37.1  C) Oral 110 -- 16 96 % 1.803 m (5' 11\") 92.1 kg (203 lb)       Physical Exam  Physical Exam   General:  Sitting on bed with wife at bedside.   HENT:  No obvious trauma to head  Right Ear:  External ear normal.   Left Ear:  External ear normal.   Nose:  Nose normal.   Eyes:  Conjunctivae and EOM are normal. Pupils are equal, round, and reactive.   Neck: Normal range of motion. Neck supple. No tracheal deviation present.   CV:  Normal heart sounds. No murmur heard.  Pulm/Chest: Effort normal, bibasilar coarse breath sounds.  Good air exchange.  Abd: Soft. No distension. There is no tenderness. There is no rigidity, no rebound and no guarding.   M/S: Normal range of motion.   Neuro: Alert. GCS 15.  Skin: Skin is warm and dry. No rash noted. Not diaphoretic.   Psych: Normal mood and affect. Behavior is normal.      Emergency Department Course   ECG:  Indication: shortness of breath  Time: 1841  Vent. Rate 112 bpm. DE interval 144. QRS duration 82. QT/QTc 366/499. P-R-T axis 51 -29 63. Sinus tachycardia. Non specific T wave abnormality. Abnormal ECG. Read time: 1855. No significant changes to EKG dated 8/19/14.      Imaging:  Radiographic findings were communicated with the patient who voiced understanding of the findings.  CT Chest w/ IV contrast - PE protocol:   1.  No pulmonary embolism seen.  2.  Moderate to large bilateral pleural effusions with adjacent compressive subsegmental atelectasis. As per radiology.     Interventions:  2130 Aspirin PO  2130 Lasix 40 mg IV    Laboratory:  CBC: WBC: 10.8, HGB: 13.8, PLT: 243  BMP: " Glucose 125 (H), Chloride:110 (H), o/w WNL (Creatinine: 1.10)    BNP:1120 (H)  D Dimer: 0.8 (H)  Troponin:0.072 (H)    Emergency Department Course:  Nursing notes and vitals reviewed. EKG was obtained, findings above.      1853  I performed an exam of the patient as documented above.     Blood drawn. This was sent to the lab for further testing, results above.    The patient was sent for a chest CT while in the emergency department, findings above.     2057 I rechecked the patient and discussed the results of his workup thus far.     2118  I consulted with Dr. Kilpatrick of the hospitalist services. They are in agreement to accept the patient for admission.    Findings and plan explained to the Patient who consents to admission. Discussed the patient with Dr. Kilpatrick, who will admit the patient to a medical bed for further monitoring, evaluation, and treatment.       Impression & Plan      Medical Decision Making:  Jose Reid is a very pleasant 59 year old male with who presents for evaluation of shortness of breath.  He had a prior back abscess last month he required hospitalization and surgery.  He had a routine follow-up today.  He did have a low-grade temp of 99 earlier today.  He incidentally mentioned that he is felt slightly short of breath.  His primary that actually this concerning for CHF and sentiment to the ED.  Being that he has had a recent hospitalization, I considered pulmonary embolism.  A d-dimer is high.  A follow-up CT PE study is negative for PE.  It did show some changes that could be consistent with CHF.  He does not have a cough and pneumonia is less likely.  His troponin is slightly elevated and he does have a few nonspecific findings on the EKG.  The patient has not had any chest pain, chest pressure, exertional chest pain and I doubt that this represents a non-STEMI.  Serial troponins would be of benefit.  There is a slight Wellens appearance to the anterior leads.  The patient requires  admission to the hospital for continued evaluation of this and echocardiogram.  I considered a broad differential of their dyspnea including CHF exacerbation, PE, dissection, hemothorax, pleural effusion, pneumonia, acute coronary syndrome, reactive airway disease, bronchitis, upper airway obstruction, foreign body, etc.  Given the history and exam plus laboratory findings and chest CT, I feel congestive heart failure is the most likely etiology and would not do extensive workup for other causes as mentioned above at this time.  The patient will be started on IV Lasix in ED. Will admit at this time for further cares.    Diagnosis:    ICD-10-CM    1. Acute on chronic congestive heart failure, unspecified heart failure type (H) I50.9    2. Elevated troponin R74.8        Disposition:  Admitted to Dr. Kilpatrick.    I, Madai Crawford, am serving as a scribe on 8/19/2019 at 6:53 PM to personally document services performed by Niall Lazaro DO based on my observations and the provider's statements to me.      Madai Crawford  8/19/2019    EMERGENCY DEPARTMENT       Niall Lazaro DO  08/19/19 [id number]

## 2019-08-20 NOTE — CONSULTS
Consult Date:  08/20/2019      REASON FOR CARDIOLOGY CONSULT:  Myonecrosis, congestive heart failure.      REQUESTING PHYSICIAN:  Dr. Kilpatrick.      CHIEF COMPLAINT:  Elevated blood pressure, orthopnea, dyspnea on exertion.      HISTORY OF PRESENT ILLNESS:  Mr. Reid is a very pleasant 51-year-old gentleman who is a  and recently had hospitalization last month due to sepsis from cellulitis and back abscess and had surgery for same.  At that time, it looks like the patient had some hypotension and acute renal failure and hydrochlorothiazide and lisinopril were stopped.  He was discharged on metoprolol.  The patient tells me for the last 10 days he has been feeling short of breath, especially with exertion.  He has also noticed orthopnea.  In fact, a few times a night he has to get up in the middle of the night because he is not able to catch breath.  Remarkably, his weights are kind of stable.  He has noticed swelling over lower extremities.  Upon admission, he was noted to have elevated blood pressure, 182/118.  He was mildly tachycardic.  He was also noted to have elevated NT proBNP of 1,120.  Troponin was mildly flat pattern elevation at 0.072, 0.077, 0.067.  He also has diabetes and his hemoglobin A1c is elevated at 10.4.  He had x-ray that showed bilateral pleural effusion which was confirmed on CT chest that showed moderate to large bilateral pleural effusion, but no PE was noted.  He was started on diuresis and so far, had good urine output close to 1.3 liters.  He is feeling better.  Remarkably, his blood pressure is still elevated.  He has not been yet started on any antihypertensive medications.  The patient denies any chest discomfort.  The patient tells me that he has struggled with high blood pressures for the last 5-6 months.  He has been on a variety of medications according to him including amlodipine, lisinopril/hydrochlorothiazide.  He had some dizziness in response to a few medications.  He  denies any tobacco exposure.  No alcohol.  There is no family history of any known premature coronary artery disease.  He does not use any NSAIDs on a regular basis.  No history of sleep apnea.  He denies any snoring.      REVIEW OF SYSTEMS:  A complete review of systems was performed and was negative except in HPI.      PAST MEDICAL HISTORY:   1.  Diabetes, which does not appear well controlled.     2.  Dyslipidemia.   3.  Hypertension, again, not well controlled.   4.  History of back abscess, recently had surgery for the same.      SOCIAL HISTORY:  Does not use any tobacco or alcohol.      FAMILY HISTORY:  Reviewed and noncontributory.      ALLERGIES:  AMLODIPINE, STATINS.      To be noted, his LDL is significantly elevated at 190.  When checked on lipid panel of 03/2019, LDL was 41, triglycerides were 272.      CURRENT MEDICATIONS:   1.  Lasix 40 mg IV b.i.d.   2.  Insulin.   3.  Aspirin 325 mg once.      PHYSICAL EXAMINATION:   VITAL SIGNS:  Blood pressure is 170/115, heart rate 103-108 regular, respiratory rate 18, 92%-to 96% on room air.   GENERAL:  The patient appears pleasant, comfortable.   NECK:  JVP appears normal with positive hepatojugular reflux.   CARDIOVASCULAR:  S1, S2 normal, S4 heard, no S3 heard.  No murmur, rub or gallop.   RESPIRATORY:  Decreased air entry bilaterally at the bases to one-third, no crackles or rhonchi.   ABDOMEN:  Soft, nontender.   EXTREMITIES:  Lower extremity edema, right lower extremity greater than left.   HEENT:  No pallor or icterus.   PSYCHIATRIC:  Normal affect.   SKIN:  No obvious rash.      PERTINENT LABORATORY DATA:  NT-proBNP as noted above 1120, troponin 0.072, 0.077, 0.067.  CBC essentially normal.  BMP otherwise essentially normal with albumin slightly low at 2.8.        CT chest as noted above showed moderate to large bilateral pleural effusions.  EKG shows sinus tachycardia with some nonspecific T-wave changes.      ASSESSMENT:  A pleasant 59-year-old  gentleman who is now admitted with clinically what looks like acute decompensated congestive heart failure.  It is not known whether it is systolic or diastolic.  My suspicion is it is likely systolic.  Most likely etiology is uncontrolled hypertension.  He does have CAD risk factors of untreated dyslipidemia, uncontrolled hypertension, uncontrolled diabetes.  Fortunately, no typical anginal symptoms.  I had a long discussion with the patient and his wife regarding the nature of his presentation.  Echocardiogram is pending, which will be crucial to know the status of his LV function.  I agree with IV Lasix.  I recommend starting antiepileptic medication.  He was on metoprolol prior to admission.  I recommend replacing it with carvedilol 12.5 mg b.i.d.  He tells me that lisinopril did not work for him.  I recommend starting losartan 25 mg daily.  It is likely that he will require additional antihypertensive medication.  Also, what kind of antihypertensive medication will be guided by his LV function.  He will probably require some kind of CAD workup.  This will depend upon the results of the echocardiogram as well.  The patient and his wife also discussed about pharmacogenetic testing for antiepileptic medication.  I told the patient and his wife that I am not aware of that kind of testing at this time.  For now, I think it is reasonable to bring his blood pressure under control and I advised starting the medications I mentioned above.  I also recommend continuing routine CHF cares of salt-restricted diet, daily weights, and strict I's and O's.  In case we struggle with good diuresis and if he continues to remain symptomatic, one option would be even to consider thoracentesis, but for now, it looks like he has shown reasonable improvement in his symptoms.   1.  Acute decompensated congestive heart failure:  Systolic versus diastolic not known but I suspect systolic.  Etiology likely uncontrolled hypertension.   2.   CAD risk factors of uncontrolled hypertension, diabetes, untreated dyslipidemia:  I will defer to primary inpatient medicine team to consider alternative antilipid therapy or even alternative statin therapy.      RECOMMENDATIONS:   1.  Agree with IV diuresis.   2.  Agree with echocardiogram, which is pending.   3.  Start carvedilol 12.5 mg b.i.d.   4.  Start Losartan 25 mg daily.   5.  Routine CHF cares.      Thank you for involving me in the care of Mr. Reid.         DANIELLE CLOUD MD             D: 2019   T: 2019   MT:       Name:     KATLYN REID   MRN:      [id number]        Account:       [id number]   :      1960           Consult Date:  2019      Document: [id number]       cc: Niall Kilpatrick MD

## 2019-08-20 NOTE — PLAN OF CARE
BP elevated, Coreg and Cozaar started. Education information given to pt on new meds. Tele- SR. BG WNL, no insulin needed. WOC consulted, wound orders placed for back abscess, to be done BID. Cardiology consult completed. Plan for IV Lasix 40mg BID. UAL in room. Continue to monitor.     Notified Dr. San, BP still elevated after Coreg and Cozaar, plan to monitor with possibility of increasing dose tomorrow.

## 2019-08-20 NOTE — PROGRESS NOTES
08/20/19 1037   Quick Adds   Type of Visit Initial Occupational Therapy Evaluation   Living Environment   Lives With spouse   Living Arrangements house   Transportation Anticipated car, drives self   Living Environment Comment 7 stairs into home. split level home. 7 steps up/down   Self-Care   Usual Activity Tolerance moderate   Current Activity Tolerance moderate   Regular Exercise No   Equipment Currently Used at Home cane, straight   Activity/Exercise/Self-Care Comment uses cane most of the time   Functional Level   Ambulation 1-->assistive equipment   Transferring 1-->assistive equipment   Toileting 0-->independent   Bathing 0-->independent   Dressing 0-->independent   Eating 0-->independent   Communication 0-->understands/communicates without difficulty   Swallowing 0-->swallows foods/liquids without difficulty   Cognition 0 - no cognition issues reported   Fall history within last six months no   Which of the above functional risks had a recent onset or change? none   Prior Functional Level Comment IND prior with use of cane    General Information   Onset of Illness/Injury or Date of Surgery - Date 08/19/19   Referring Physician Niall Kilpatrick MD   Patient/Family Goals Statement return home   Additional Occupational Profile Info/Pertinent History of Current Problem presents with progressive dyspnea and leg edema and is found to have suspected acute diastolic CHF exacerbation as well as myonecrosis and accelerated hypertension.   Precautions/Limitations no known precautions/limitations   Cognitive Status Examination   Orientation orientation to person, place and time   Level of Consciousness alert   Follows Commands (Cognition) WNL   Memory intact   Visual Perception   Visual Perception No deficits were identified   Sensory Examination   Sensory Quick Adds No deficits were identified   Pain Assessment   Patient Currently in Pain No   Balance   Balance Comments IND in room with cane   Instrumental  "Activities of Daily Living (IADL)   Previous Responsibilities meal prep;driving;finances;work   Activities of Daily Living Analysis   Impairments Contributing to Impaired Activities of Daily Living   (need for monitored exercise)   General Therapy Interventions   Planned Therapy Interventions progressive activity/exercise;home program guidelines;risk factor education   Clinical Impression   Criteria for Skilled Therapeutic Interventions Met yes, treatment indicated   OT Diagnosis need for monitored exercise   Influenced by the following impairments dec ax tolerance   Assessment of Occupational Performance 1-3 Performance Deficits   Identified Performance Deficits exercise   Clinical Decision Making (Complexity) Low complexity   Therapy Frequency Daily   Predicted Duration of Therapy Intervention (days/wks) 3 days   Anticipated Discharge Disposition Home;Home with Assist   Risks and Benefits of Treatment have been explained. Yes   Patient, Family & other staff in agreement with plan of care Yes   [facility] [employer] \"6 Clicks\"   2016, Trustees of [facility], under license to [employer].  All rights reserved.   6 Clicks Short Forms Daily Activity Inpatient Short Form   [facility]  \"6 Clicks\" Daily Activity Inpatient Short Form   1. Putting on and taking off regular lower body clothing? 4 - None   2. Bathing (including washing, rinsing, drying)? 4 - None   3. Toileting, which includes using toilet, bedpan or urinal? 4 - None   4. Putting on and taking off regular upper body clothing? 4 - None   5. Taking care of personal grooming such as brushing teeth? 4 - None   6. Eating meals? 4 - None   Daily Activity Raw Score (Score out of 24.Lower scores equate to lower levels of function) 24   Total Evaluation Time   Total Evaluation Time (Minutes) 8     "

## 2019-08-20 NOTE — H&P
[facility]    History and Physical  Hospitalist       Date of Admission:  8/19/2019  Date of Service (when I saw the patient): 08/19/19    ASSESSMENT  Jose Reid is a markedly pleasant 59 year old gentleman with uncontrolled insulin-dependent Diabetes mellitus causing peripheral neuropathy, as well as hypertension, dyslipidemia, and recent hospitalization for back abscess and cellulitis due to MSSA, who presents with progressive dyspnea and leg edema and is found to have suspected acute diastolic CHF exacerbation as well as myonecrosis and accelerated hypertension.    PLAN    1) Suspected acute diastolic CHF exacerbation as well as myonecrosis and accelerated hypertension: He does not have a known history of CAD or CHF. He had a negative stress echo in 2013, and negative lower extremity KATHIE testing in 2015. He has been off several anti-hypertensives recently due to recent sepsis and he could have demand ischemia due to accelerated hypertension. We will rule out ACS.    -- Inpatient. Telemetry, serial enzymes, TTE ordered. Cardiology consulted. Diurese with 40 mg IV Lasix BID. Resume Toprol when verified. Strict I and O's, daily weights, and low sodium diet ordered. Incentive spirometer.      -- If rate of rise of Troponins becomes concerning for ACS we would start Heparin infusion overnight      -- Could consider thoracentesis if not soon improving or if TTE is non-diagnostic        -- Replace K and Mag as needed         -- Monitor renal function daily    2) Diabetes: A1c 12.6 this month. It was 14 earlier in the year and he says he started insulin therapy 3/2019. He has peripheral neuropathy and at recent Podiatry visit it was noted he had bilateral foot drop, and he was referred to see a Neurologist, and the appointment is scheduled for 2 days from now.       -- Continue Lantus 40 units at bedtime and ISS insulin       -- Maintain close outpatient follow up with Neurology at discharge    3)  Dyslipidemia: Not on medications due to his report that statins caused muscle pains    4) Recent back abscess: He was hospitalized 7/17/2019 through 7/22/2019 for sepsis due to cellulitis and back abscess. He also had YORDY and Lisinopril, hydrochlorothiazide were held. He was given Vancomycin, Zosyn and Daptomycin. Surgery was consulted and OR debridement done. Cultures reportedly positive for MSSA. He was transitioned to Ancef, then to oral Augmentin at discharge and finished 14 days antibiotic therapy. He says the wound is still open but healing slowly and denies recent purulent drainage there.      -- Wound nurse consulted for further evaluation while hospitalized    Chief Complaint   Dyspnea    History is obtained from the patient, his wife at the bedside, and the ED physician whom I have spoken with    History of Present Illness   Jose Reid is a markedly pleasant 59 year old gentleman who presents with dyspnea. This started about two weeks ago. It is present at rest and exacerbated by exertion such as carrying his bicycle down some stairs. About three days ago it got more severe and now his breath is labored all the time. He has noticed that if he sleeps laying flat he will wake up gasping for breath; if he sleeps sitting up in a chair the dyspnea is much less when he awakes. For the past 4 to 5 days he has had a non-productive cough. Since he left the hospital last month he has had worsening swelling in both ankles and lower legs, right more than left. Therefore he went in to see a Podiatrist for that as well as PCP today and was sent to our ED from there as he also had T 99 degrees. He denies any chest pain with these symptoms. He has felt warm, but denies rigors or sweats. His back wound is healing slowly as per what he was told at a recent Surgery follow up visit. He denies diarrhea or dysuria. His blood pressure regimen has been changed since hospitalization: Lisinopril and hydrochlorothiazide were  "stopped then due to YORDY and hypotension and he has not since resumed them. He has never smoked. He denies any other acute complaints.    In the ED, Blood pressure (!) 163/117, pulse 107, temperature 98.7  F (37.1  C), temperature source Oral, resp. rate 29, height 1.803 m (5' 11\"), weight 92.1 kg (203 lb), SpO2 94 %.    BP was initially 183/118. Oxygen saturations as low as 92% on room air at rest in the ED.    CBC showed WBC 11; BMP notable for Cl 110, BUN 21, Cr 1.1 (1.07 on 8/5/2019). Lactate was 0.5. VBG showed 7.42/46. BNP was 1120 and Troponin 0.072. EKG showed sinus tachycardia without ST segment changes. ESR was 18 and D Dimer 0.8. CXR by my read showed bilateral patchy opacifications and CT PA was done, negative for PE or infiltrate but it did reveal moderate to large bilateral pleural effusions causing atelectasis. He was given aspirin and 40 mg IV Lasix.    PHYSICAL EXAM  Blood pressure (!) 163/117, pulse 107, temperature 98.7  F (37.1  C), temperature source Oral, resp. rate 29, height 1.803 m (5' 11\"), weight 92.1 kg (203 lb), SpO2 94 %.  Constitutional: Alert and oriented to person, place and time; no apparent distress  HEENT: normocephalic moist mucus membranes  Respiratory: lungs have bi-basilar crackles with diminished sounds to auscultation bilaterally  Cardiovascular: regular S1 S2   GI: abdomen soft non tender non distended bowel sounds positive  Lymph/Hematologic: no pallor, no cervical lymphadenopathy  Skin: no rash, good turgor  Musculoskeletal: mild to moderate bilateral LE edema, right greater than left  Neurologic: extra-ocular muscles intact; moves all four extremities  Psychiatric: appropriate affect, insight and judgment     DVT Prophylaxis: Pneumatic Compression Devices  Code Status: Full Code    Disposition: Expected discharge in at least 2-3 days due to need for frequent IV diuresis    Niall Kilpatrick MD    Past Medical History    I have reviewed this patient's medical history " and updated it with pertinent information if needed.   Past Medical History:   Diagnosis Date     Abscess     MSSA back abscess     Diabetes (H)      Dyslipidemia      Erectile dysfunction      Hypertension      Peripheral neuropathy      Seasonal depression (H)        Past Surgical History   I have reviewed this patient's surgical history and updated it with pertinent information if needed.  Past Surgical History:   Procedure Laterality Date     APPENDECTOMY       INCISION AND DRAINAGE, ABSCESS, SIMPLE N/A 2019    Procedure: INCISION AND DRAINAGE OF BACK ABSCESS;  Surgeon: Richard Lopez MD;  Location:  GI       Prior to Admission Medications   Prior to Admission Medications   Prescriptions Last Dose Informant Patient Reported? Taking?   LORazepam (ATIVAN) 1 MG tablet   No No   Si tablet prior to procedure   VITAMIN D, CHOLECALCIFEROL, PO  Self Yes No   Sig: Take 2 tablets by mouth At Bedtime strength unknown   aspirin 81 MG EC tablet  Self Yes No   Sig: Take 81 mg by mouth At Bedtime    cyanocobalamin (CYANOCOBALAMIN) 1000 MCG/ML injection  Self Yes No   Sig: Inject 1,000 mcg into the muscle every 3 days    insulin aspart (NOVOLOG PEN) 100 UNIT/ML pen   No No   Sig: Pre-Meal 140-189 =1 unit, 190-239 =2 units, 240-289 =3 units, 290-339 =4 units, = or >340 =5 units   insulin glargine (LANTUS PEN) 100 UNIT/ML pen   No No   Sig: Inject 40 Units Subcutaneous At Bedtime   insulin pen needle (31G X 5 MM) 31G X 5 MM miscellaneous  Self No No   Sig: Use 1 pen needles daily or as directed.   metoprolol succinate ER (TOPROL-XL) 50 MG 24 hr tablet   No No   Sig: Take 1 tablet (50 mg) by mouth daily   order for DME   No No   Sig: Equipment being ordered:   Two pair compression stockings; knee high; 30-40mmHg   vitamin B complex with vitamin C (STRESS TAB) tablet  Self Yes No   Sig: Take 1 tablet by mouth At Bedtime      Facility-Administered Medications: None     Allergies   Allergies   Allergen Reactions      Exenatide Rash     Hmg-Coa-R Inhibitors Muscle Pain (Myalgia) and Other (See Comments)     Extreme Fatigue       Amlodipine      Other reaction(s): Dizziness  Dizzy       Hctz [Hydrochlorothiazide]        Social History   I have reviewed this patient's social history and updated it with pertinent information if needed. Jose Reid  reports that he has never smoked. He has never used smokeless tobacco. He reports that he does not drink alcohol or use drugs.    Family History   Family history assessed and, except as above, is non-contributory.    Family History   Problem Relation Age of Onset     Hypertension Maternal Grandmother        Review of Systems   The 10 point Review of Systems is negative other than noted in the HPI or here.     Primary Care Physician   REINIER PINO    Data   Labs Ordered and Resulted from Time of ED Arrival Up to the Time of Departure from the ED   BASIC METABOLIC PANEL - Abnormal; Notable for the following components:       Result Value    Chloride 110 (*)     Glucose 125 (*)     All other components within normal limits   TROPONIN I - Abnormal; Notable for the following components:    Troponin I ES 0.072 (*)     All other components within normal limits   D DIMER QUANTITATIVE - Abnormal; Notable for the following components:    D Dimer 0.8 (*)     All other components within normal limits   NT PROBNP INPATIENT - Abnormal; Notable for the following components:    N-Terminal Pro BNP Inpatient 1,120 (*)     All other components within normal limits   ISTAT  GASES LACTATE VINH POCT - Abnormal; Notable for the following components:    Bicarbonate Venous 30 (*)     Lactic Acid 0.5 (*)     All other components within normal limits   CBC WITH PLATELETS DIFFERENTIAL   ISTAT GAS OR ELECTROLYTE NURSING POCT       Data reviewed today:  I personally reviewed the EKG tracing showing sinus tachycardia.    Recent Results (from the past 24 hour(s))   CT Chest Pulmonary Embolism w Contrast     Narrative    CT CHEST PULMONARY EMBOLISM WITH CONTRAST  8/19/2019 8:19 PM    HISTORY:  Pulmonary embolism suspected, intermediate prob, positive  D-dimer.    TECHNIQUE: Scans obtained from the apices through the diaphragm with  IV contrast. 73 mL Isovue-370 IV injected. Radiation dose for this  scan was reduced using automated exposure control, adjustment of the  mA and/or kV according to patient size, or iterative reconstruction  technique.    COMPARISON:  None available.    FINDINGS:  Vascular: No acute thoracic aortic abnormality. No evidence for  pulmonary embolism.    Lungs and pleura: Moderate to large bilateral pleural effusions with  adjacent compressive subsegmental atelectasis. No suspicious pulmonary  nodule or mass is seen.    Lymph nodes: No enlarged lymph nodes.    Additional findings: The visualized portions of the thyroid gland are  unremarkable. Heart size is normal. No pericardial effusion is seen.  Visualized portions of the upper abdomen are unremarkable. Mild,  multilevel degenerative changes seen in the spine.      Impression    IMPRESSION:   1.  No pulmonary embolism seen.  2.  Moderate to large bilateral pleural effusions with adjacent  compressive subsegmental atelectasis.    TOMI GELLER MD

## 2019-08-20 NOTE — PROGRESS NOTES
[facility] Nurse Inpatient Wound Assessment   Reason for consultation: Evaluate and treat distal back I&D site    Assessment    Distal back I&D site: healing, decrease erythema, small amt adherent slough @ wound margin remains     Treatment Plan  Lower back wound: BID   1.  Cleanse with MicroKlenz.    2.  Fill in wound bed with bacitracin ointment  3. Cover with Mepilex border     Orders Written  WOC Nurse follow-up plan:weekly if pt remains in hospital   Nursing to notify the Provider(s) and re-consult the WO Nurse if wound(s) deteriorates or new skin concern.    Patient History  According to provider note(s):  .   Jose Reid is a markedly pleasant 59 year old gentleman with uncontrolled insulin-dependent Diabetes mellitus causing peripheral neuropathy, as well as hypertension, dyslipidemia, and recent hospitalization for back abscess and cellulitis due to MSSA, who presents with progressive dyspnea and leg edema and is found to have suspected acute diastolic CHF exacerbation as well as myonecrosis and accelerated hypertension.    7-19-19:  I&D distal back wound by Dr. Lopez. Current tx is bacitracin Bid to wound    Objective Data  Containment of urine/stool: continent    Active Diet Order    Combination Diet Regular Diet Adult; 5125-3481 Calories: Moderate Consistent CHO (4-6 CHO units/meal); 2 gm NA Diet; No Caffeine Diet    Output:   I/O last 3 completed shifts:  In: 560 [P.O.:560]  Out: 1350 [Urine:1350]    Risk Assessment:   Sensory Perception: 4-->no impairment  Moisture: 4-->rarely moist  Activity: 3-->walks occasionally  Mobility: 4-->no limitation  Nutrition: 3-->adequate  Friction and Shear: 3-->no apparent problem  Chuckie Score: 21                          Labs:   Recent Labs   Lab 08/20/19 0528 08/19/19  1916   ALBUMIN 2.8*  --    HGB 13.1* 13.8   WBC 10.3 10.8   A1C  --  10.4*       Physical Exam  Skin inspection: focused back    Wound Location:  Distal  mid back  Date of last [facility] photo:  7-20-19               Size: (length x width x depth, in cm)  Horizontal incision approx 2.0cm  x .5cm x .3cm.    Wound Base:90% red granular base with 10% whitish/green slough @ superior edge  Tunneling N/A  Undermining N/A  Palpation of the wound bed: firm   Periwound skin: red, resolviing erythema  Temperature: normal  Drainage: none  Odor: none  Pain None    Interventions  Current support surface: Standard  Atmos Air mattress  Current off-loading measures: n/a, pt moves independently  Visual inspection of wound(s) completed  Wound Care: cleaned MicroKlenz                         Appsfire cell AG into wound bed today. Will resume Bacitracin with next dressing change                        Covered Mepilex borders  Supplies: At bedside and bacitracin ordered  Education provided: plan of care and wound progress  Discussed plan of care with Patient and wife    Jimena Mcpherson RN

## 2019-08-20 NOTE — PROGRESS NOTES
RECEIVING UNIT ED HANDOFF REVIEW    ED Nurse Handoff Report was reviewed by: Earlene Milton on August 19, 2019 at 9:31 PM

## 2019-08-20 NOTE — TELEPHONE ENCOUNTER
Prior Authorization Retail Medication Request    Medication/Dose: insulin aspart (NOVOLOG PEN) 100 UNIT/ML pen  ICD code (if different than what is on RX):  na  Previously Tried and Failed:  na  Rationale:  na    Insurance Name:  INFORMED RX C  Insurance ID:  [id number]      Pharmacy Information (if different than what is on RX)  Name:  na  Phone:  na    PHARMACY COMMENTS:  Need PA and Daily Max/Average for Insurance Billing.

## 2019-08-20 NOTE — PROVIDER NOTIFICATION
MD Notification    Notified Person: MD Dr Kilpatrick    Notified Person Name:    Notification Date/Time:  8/20/2019  0545  Notification Interaction:    Purpose of Notification:hypertension 162/107    Orders Received:give AM dose of Lasix early    Comments:will continue to monitor.

## 2019-08-20 NOTE — PLAN OF CARE
Pt stable over night, no c/o Chest pain or sob.  Pt was hypertensive and AM dose of Lasix was given early.  Pt had good urinary output with 900cc out.  Pt will have an Echo and be seen by [facility] and the [facility] RN.

## 2019-08-20 NOTE — ED NOTES
"[facility]  ED Nurse Handoff Report    ED Chief complaint: Shortness of Breath and Cough      ED Diagnosis:   Final diagnoses:   None       Code Status: Full Code    Allergies:   Allergies   Allergen Reactions     Exenatide Rash     Hmg-Coa-R Inhibitors Muscle Pain (Myalgia) and Other (See Comments)     Extreme Fatigue       Amlodipine      Other reaction(s): Dizziness  Dizzy       Hctz [Hydrochlorothiazide]        Activity level - Baseline/Home:  Independent  Activity Level - Current:   Independent    Patient's Preferred language: english   Needed?: No    Isolation: No  Infection: Not Applicable  Bariatric?: No    Vital Signs:   Vitals:    08/19/19 1901 08/19/19 1927 08/19/19 2051 08/19/19 2100   BP:    (!) 163/117   Pulse:    107   Resp: 23 21 23 29   Temp:       TempSrc:       SpO2: 93% 92%  94%   Weight:       Height:           Cardiac Rhythm: ,   Cardiac  Cardiac Rhythm: Sinus tachycardia    Pain level:      Is this patient confused?: No   Does this patient have a guardian?  No         If yes, is there guardianship documents in the Epic \"Code/ACP\" activity?  N/A         Guardian Notified?  N/A  [place] - Suicide Severity Rating Scale Completed?  Yes  If yes, what color did the patient score?  White    Patient Report: Initial Complaint: SOB starting about 1 week ago. Pt reports being seen in clinic for f/u after back surgery that was in July. Had EKG and blood work done at office and sent to ED for further eval  Focused Assessment: shortness of breath and c/p  Tests Performed: lab, cxr  Abnormal Results:   Results for orders placed or performed during the hospital encounter of 08/19/19   CT Chest Pulmonary Embolism w Contrast    Narrative    CT CHEST PULMONARY EMBOLISM WITH CONTRAST  8/19/2019 8:19 PM    HISTORY:  Pulmonary embolism suspected, intermediate prob, positive  D-dimer.    TECHNIQUE: Scans obtained from the apices through the diaphragm with  IV contrast. 73 mL Isovue-370 " IV injected. Radiation dose for this  scan was reduced using automated exposure control, adjustment of the  mA and/or kV according to patient size, or iterative reconstruction  technique.    COMPARISON:  None available.    FINDINGS:  Vascular: No acute thoracic aortic abnormality. No evidence for  pulmonary embolism.    Lungs and pleura: Moderate to large bilateral pleural effusions with  adjacent compressive subsegmental atelectasis. No suspicious pulmonary  nodule or mass is seen.    Lymph nodes: No enlarged lymph nodes.    Additional findings: The visualized portions of the thyroid gland are  unremarkable. Heart size is normal. No pericardial effusion is seen.  Visualized portions of the upper abdomen are unremarkable. Mild,  multilevel degenerative changes seen in the spine.      Impression    IMPRESSION:   1.  No pulmonary embolism seen.  2.  Moderate to large bilateral pleural effusions with adjacent  compressive subsegmental atelectasis.    TOMI GELLER MD   CBC with platelets differential   Result Value Ref Range    WBC 10.8 4.0 - 11.0 10e9/L    RBC Count 4.53 4.4 - 5.9 10e12/L    Hemoglobin 13.8 13.3 - 17.7 g/dL    Hematocrit 41.5 40.0 - 53.0 %    MCV 92 78 - 100 fl    MCH 30.5 26.5 - 33.0 pg    MCHC 33.3 31.5 - 36.5 g/dL    RDW 14.9 10.0 - 15.0 %    Platelet Count 243 150 - 450 10e9/L    Diff Method Automated Method     % Neutrophils 56.8 %    % Lymphocytes 29.3 %    % Monocytes 8.4 %    % Eosinophils 3.5 %    % Basophils 1.6 %    % Immature Granulocytes 0.4 %    Nucleated RBCs 0 0 /100    Absolute Neutrophil 6.2 1.6 - 8.3 10e9/L    Absolute Lymphocytes 3.2 0.8 - 5.3 10e9/L    Absolute Monocytes 0.9 0.0 - 1.3 10e9/L    Absolute Eosinophils 0.4 0.0 - 0.7 10e9/L    Absolute Basophils 0.2 0.0 - 0.2 10e9/L    Abs Immature Granulocytes 0.0 0 - 0.4 10e9/L    Absolute Nucleated RBC 0.0    Basic metabolic panel   Result Value Ref Range    Sodium 144 133 - 144 mmol/L    Potassium 4.0 3.4 - 5.3 mmol/L    Chloride  110 (H) 94 - 109 mmol/L    Carbon Dioxide 27 20 - 32 mmol/L    Anion Gap 7 3 - 14 mmol/L    Glucose 125 (H) 70 - 99 mg/dL    Urea Nitrogen 21 7 - 30 mg/dL    Creatinine 1.10 0.66 - 1.25 mg/dL    GFR Estimate 73 >60 mL/min/[1.73_m2]    GFR Estimate If Black 85 >60 mL/min/[1.73_m2]    Calcium 8.7 8.5 - 10.1 mg/dL   Troponin I   Result Value Ref Range    Troponin I ES 0.072 (H) 0.000 - 0.045 ug/L   D dimer quantitative   Result Value Ref Range    D Dimer 0.8 (H) 0.0 - 0.50 ug/ml FEU   Nt probnp inpatient (BNP)   Result Value Ref Range    N-Terminal Pro BNP Inpatient 1,120 (H) 0 - 900 pg/mL   ISTAT gases lactate luna POCT   Result Value Ref Range    Ph Venous 7.42 7.32 - 7.43 pH    PCO2 Venous 46 40 - 50 mm Hg    PO2 Venous 28 25 - 47 mm Hg    Bicarbonate Venous 30 (H) 21 - 28 mmol/L    O2 Sat Venous 52 %    Lactic Acid 0.5 (L) 0.7 - 2.1 mmol/L     Treatments provided: pt had an abcess on his back and had it drained a couple weeks ago. Pt still has the wound on his back.  ASA and lasix given    Family Comments:     OBS brochure/video discussed/provided to patient/family: N/A              Name of person given brochure if not patient:               Relationship to patient:     ED Medications:   Medications   Saline (96 mLs As instructed Given 8/19/19 2007)   iopamidol (ISOVUE-370) solution 73 mL (73 mLs Intravenous Given 8/19/19 2006)       Drips infusing?:  No    For the majority of the shift this patient was Green.   Interventions performed were .    Severe Sepsis OR Septic Shock Diagnosis Present: No    To be done/followed up on inpatient unit:      ED NURSE PHONE NUMBER: [phone]

## 2019-08-20 NOTE — PLAN OF CARE
Heart Failure Care Pathway  GOALS TO BE MET BEFORE DISCHARGE:    1. Decrease congestion and/or edema with diuretic therapy to achieve near      optimal volume status.            Dyspnea improved:  Yes            Edema improved:     Yes        Net I/O and Weights since admission:          07/21 2300 - 08/20 2259  In: 560 [P.O.:560]  Out: 2650 [Urine:2650]  Net: -2090            Vitals:    08/19/19 1844 08/20/19 0206   Weight: 92.1 kg (203 lb) 91.2 kg (201 lb 1.6 oz)         2.  O2 sats > 92% on RA or at prior home O2 therapy level.          Current oxygenation status:       SpO2: 95 %         O2 Device: None (Room air),            Able to wean O2 this shift to keep sats > 92%:  Yes       Does patient use Home O2? No    3.  Tolerates ambulation and mobility near baseline: Yes        How many times did the patient ambulate with nursing staff this shift? 2    VSS, rhythm ST HR around 100. Diuresing with IV lasix. Independent in room. Voids in urinal and documents amount on white board. RA. Brief episodes of heavy breathing resolve with rest. Echo tomorrow.    Please review the Heart Failure Care Pathway for additional HF goal outcomes.    Suzi Prater RN RN  8/20/2019

## 2019-08-20 NOTE — PHARMACY-ADMISSION MEDICATION HISTORY
Admission medication history interview status for the 8/19/2019  admission is complete. See EPIC admission navigator for prior to admission medications     Medication history source reliability:Good    Actions taken by pharmacist (provider contacted, etc): Interviewed patient and wife     Additional medication history information not noted on PTA med list :None    Medication reconciliation/reorder completed by provider prior to medication history? No    Time spent in this activity: 15 minutes    Prior to Admission medications    Medication Sig Last Dose Taking? Auth Provider   aspirin 81 MG EC tablet Take 81 mg by mouth At Bedtime  8/18/2019 at pm Yes Arnie Walker MD   insulin aspart (NOVOLOG PEN) 100 UNIT/ML pen Pre-Meal 140-189 =1 unit, 190-239 =2 units, 240-289 =3 units, 290-339 =4 units, = or >340 =5 units 8/19/2019 at 1200 Yes Marcial Larry PA-C   insulin glargine (LANTUS PEN) 100 UNIT/ML pen Inject 40 Units Subcutaneous At Bedtime 8/18/2019 at pm Yes Hans Lazaro,    metoprolol succinate ER (TOPROL-XL) 50 MG 24 hr tablet Take 1 tablet (50 mg) by mouth daily 8/18/2019 at pm Yes Marcial Larry PA-C   vitamin B complex with vitamin C (STRESS TAB) tablet Take 1 tablet by mouth At Bedtime 8/18/2019 at pm Yes Unknown, Entered By History   Vitamin D, Cholecalciferol, 1000 units TABS Take 2 tablets by mouth At Bedtime strength unknown  8/18/2019 at pm Yes Unknown, Entered By History   cyanocobalamin (CYANOCOBALAMIN) 1000 MCG/ML injection Inject 1,000 mcg into the muscle every 3 days  8/16/2019  Unknown, Entered By History       Alize Zamudio,  PharmD  [phone]

## 2019-08-20 NOTE — PROGRESS NOTES
[facility]    Hospitalist Progress Note    Date of Service (when I saw the patient): 08/20/2019    Assessment & Plan   Jose Reid is a markedly pleasant 59 year old gentleman with uncontrolled insulin-dependent Diabetes mellitus causing peripheral neuropathy, as well as hypertension, dyslipidemia, and recent hospitalization for back abscess and cellulitis due to MSSA, who presents with progressive dyspnea and leg edema and is found to have suspected acute diastolic CHF exacerbation as well as myonecrosis and accelerated hypertension.     Suspected acute diastolic CHF exacerbation  Troponin elevation, likely demand ischemia  Accelerated hypertension  He does not have a known history of CAD or CHF. He had a negative stress echo in 2013, and negative lower extremity KATHIE testing in 2015. He has been off several anti-hypertensives recently due to recent sepsis and he could have demand ischemia due to accelerated hypertension. We will rule out ACS.  --Telemetry monitoring  --Serial troponin 0.072-->0.077-->0.067  --TTE pending   --Cardiology consulted.   --Diurese with 40 mg IV Lasix BID.   --Start carvedilol 12.5 mg b.i.d. (metoprolol d/c'd)  --Start Losartan 25 mg daily.   --Increase BP meds as necessary  --Strict I and O's, daily weights, and low sodium diet ordered.   --Incentive spirometer.  --Replace K and Mag as needed  --Monitor renal function daily     Insulin dependent Diabetes: A1c 12.6 this month. It was 14 earlier in the year and he says he started insulin therapy 3/2019. He has peripheral neuropathy and at recent Podiatry visit it was noted he had bilateral foot drop, and he was referred to see a Neurologist, and the appointment is scheduled for 2 days from now.  --Continue Lantus 40 units at bedtime and ISS insulin  --Maintain close outpatient follow up with Neurology at discharge     Dyslipidemia: Not on medications due to his report that statins caused muscle pains     Recent back  abscess: He was hospitalized 7/17/2019 through 7/22/2019 for sepsis due to cellulitis and back abscess. He also had YORDY and Lisinopril, hydrochlorothiazide were held. He was given Vancomycin, Zosyn and Daptomycin. Surgery was consulted and OR debridement done. Cultures reportedly positive for MSSA. He was transitioned to Ancef, then to oral Augmentin at discharge and finished 14 days antibiotic therapy. He says the wound is still open but healing slowly and denies recent purulent drainage there.  --Wound nurse consulted for further evaluation while hospitalized    Edson Langford    Interval History   Pt reported some sob earlier while lying in bed. This improved with sitting up in the chair.  Denies sob presently.  Denies cp, lightheadedness, abd pain, nausea, palpitations.    -Data reviewed today: I reviewed all new labs and imaging results over the last 24 hours.    Physical Exam   Temp: 98  F (36.7  C) Temp src: Oral BP: (!) 146/99 Pulse: 100 Heart Rate: 99 Resp: 18 SpO2: 92 % O2 Device: None (Room air)    Vitals:    08/19/19 1844 08/20/19 0206   Weight: 92.1 kg (203 lb) 91.2 kg (201 lb 1.6 oz)     Vital Signs with Ranges  Temp:  [98  F (36.7  C)-99.4  F (37.4  C)] 98  F (36.7  C)  Pulse:  [100-110] 100  Heart Rate:  [] 99  Resp:  [16-29] 18  BP: (146-183)/() 146/99  SpO2:  [92 %-96 %] 92 %  I/O last 3 completed shifts:  In: 200 [P.O.:200]  Out: 1350 [Urine:1350]    Constitutional: Alert, oriented to person, place, date, situation.  Cooperative, sitting up in the chair in NAD.   Respiratory:  Lungs CTAB.  No crackles, wheezes, or rhonchi, no labored breathing.  Cardiovascular:  Heart RRR, no MRG, mild LE edema.  GI:  Abdomen soft, NT/ND and with normoactive BS  Skin/Integumen:  Warm, dry, non-diaphoretic.  MSK: CMS x4 intact.    Medications       furosemide  40 mg Intravenous BID     insulin aspart  1-3 Units Subcutaneous TID AC     insulin aspart  1-3 Units Subcutaneous At Bedtime     insulin  glargine  40 Units Subcutaneous At Bedtime     sodium chloride (PF)  3 mL Intracatheter Q8H       Data   Recent Labs   Lab 08/20/19  0528 08/19/19  1916 08/19/19  1654   WBC 10.3 10.8 11.3*   HGB 13.1* 13.8 14.9   MCV 92 92 92    243 247    144  --    POTASSIUM 3.5 4.0  --    CHLORIDE 109 110*  --    CO2 28 27  --    BUN 20 21  --    CR 1.08 1.10  --    ANIONGAP 6 7  --    SUE 8.1* 8.7  --    * 125*  --    ALBUMIN 2.8*  --   --    PROTTOTAL 6.3*  --   --    BILITOTAL 0.5  --   --    ALKPHOS 67  --   --    ALT 19  --   --    AST 15  --   --    TROPI 0.077* 0.072*  --        Recent Results (from the past 24 hour(s))   CT Chest Pulmonary Embolism w Contrast    Narrative    CT CHEST PULMONARY EMBOLISM WITH CONTRAST  8/19/2019 8:19 PM    HISTORY:  Pulmonary embolism suspected, intermediate prob, positive  D-dimer.    TECHNIQUE: Scans obtained from the apices through the diaphragm with  IV contrast. 73 mL Isovue-370 IV injected. Radiation dose for this  scan was reduced using automated exposure control, adjustment of the  mA and/or kV according to patient size, or iterative reconstruction  technique.    COMPARISON:  None available.    FINDINGS:  Vascular: No acute thoracic aortic abnormality. No evidence for  pulmonary embolism.    Lungs and pleura: Moderate to large bilateral pleural effusions with  adjacent compressive subsegmental atelectasis. No suspicious pulmonary  nodule or mass is seen.    Lymph nodes: No enlarged lymph nodes.    Additional findings: The visualized portions of the thyroid gland are  unremarkable. Heart size is normal. No pericardial effusion is seen.  Visualized portions of the upper abdomen are unremarkable. Mild,  multilevel degenerative changes seen in the spine.      Impression    IMPRESSION:   1.  No pulmonary embolism seen.  2.  Moderate to large bilateral pleural effusions with adjacent  compressive subsegmental atelectasis.    TOMI GELLER MD

## 2019-08-20 NOTE — PLAN OF CARE
Discharge Planner OT   Patient plan for discharge: home with spouse     Current status: order received, chart reviewed, eval completed, tx initiated. Pt lives in split level home with spouse and reports IND with ADL/IADL's prior with use of cane for mobility.     Pt ambulated on treadmill x 10 minutes at 1.1 MPH and completed 15 stairs with B railings. tolerates well. Hypertensive at rest and hypertensive response to exercise, see vitals flowsheet. Pt with hypertension at baseline. Pt has not yet been seen by Cardiology- diagnosis of CHF has not been confirmed, pt and spouse unsure of plans. did not provide education at this time will await cardiology consult.     Barriers to return to prior living situation: none     Recommendations for discharge: home with spouse A for higher level IADL's cooking, cleaning, laundry     Rationale for recommendations: anticipate pt is near/at baseline with mobility. Pt reports feeling better this date with improved tolerance for activity. Hypertension at baseline. Pt with disturbed gait at baseline and uses cane for balance. Will continue to follow and provide education as appropriate following cards consult.        Entered by: Kate Beltre 08/20/2019 11:16 AM

## 2019-08-20 NOTE — PLAN OF CARE
Pt A&Ox4, VSS on RA, up ind in room. Tele ST. Voids in BR (using urinal to help staff track I&Os). Denies pain. BG WDL, gave lantus only. Surgical incision (prior) has CDI dressing on low back. Skin WDL. Baseline BLE neuropathy. Discharge TBD.

## 2019-08-21 NOTE — CONSULTS
Orders received for CHF to assist with disposition and discharge planning. CHF education to be completed by bedside RN. Will continue to follow and assist with discharge planning.

## 2019-08-21 NOTE — CONSULTS
NUTRITION EDUCATION      REASON FOR NUTRITION CONSULT:  Provider Order  -  Nutrition Education Congestive Heart Failure (CHF) - Dietitian to instruct patient on 2 gram sodium diet    ASSESSMENT:  Unable to complete nutrition assessment and instructions at this time.     FOLLOW UP:   Will instruct patient prior to discharge as schedule allows.     Elyssa Duffy RD, LD  [facility] [place], [address] 56, [place]   Pager: [phone]  Weekend Pager: [phone]

## 2019-08-21 NOTE — PLAN OF CARE
Pt A/Ox4, IV SL and pt on RA. No c/o pain. VSS. Pt up ind. Tele: SR/ST. Will continue to monitor.    Gale Snow RN on 8/20/2019 at 10:12 PM

## 2019-08-21 NOTE — PROGRESS NOTES
[facility]    Hospitalist Progress Note    Date of Service (when I saw the patient): 08/21/2019    Assessment & Plan   Jose Reid is a markedly pleasant 59 year old gentleman with uncontrolled insulin-dependent Diabetes mellitus causing peripheral neuropathy, as well as hypertension, dyslipidemia, and recent hospitalization for back abscess and cellulitis due to MSSA, who presents with progressive dyspnea and leg edema and is found to have suspected acute diastolic CHF exacerbation as well as elevated troponin and accelerated hypertension.     Suspected acute diastolic CHF exacerbation  Troponin elevation, likely demand ischemia  Accelerated hypertension  He does not have a known history of CAD or CHF. He had a negative stress echo in 2013, and negative lower extremity KATHIE testing in 2015. He has been off several anti-hypertensives recently due to recent sepsis and he could have demand ischemia due to accelerated hypertension. We will rule out ACS.  --Telemetry monitoring  --Serial troponin 0.072-->0.077-->0.067  --TTE with mildly reduced LV function, EF 45-50%, mild global hypokinesis of left ventricle.  --Lexiscan ST without apparent ischemia.  --Cardiology following, appreciated.  --Continue 40 mg IV Lasix BID.   --Started carvedilol 12.5 mg b.i.d. (metoprolol d/c'd)  --Increase Losartan to 50 mg daily.   --Strict I and O's, daily weights, and low sodium diet.   --Incentive spirometer.  --Replace K and Mag as needed  --Monitor renal function  --Cards concerned regarding untreated dyslipidemia especially significantly elevated LDL.  Pt has significant fatigue and myalgias from statins in the past.  Given this level of LDL also with underlying diabetes Cards recommends PCSK9 inhibitor.  Care coordinator consulted to assist with insurance coverage.     Insulin dependent Diabetes: A1c 12.6 this month. It was 14 earlier in the year and he says he started insulin therapy 3/2019. He has peripheral  neuropathy and at recent Podiatry visit it was noted he had bilateral foot drop, and he was referred to see a Neurologist, and the appointment is scheduled for 2 days from now.  --Continue Lantus 40 units at bedtime and ISS insulin  --Maintain close outpatient follow up with Neurology at discharge     Dyslipidemia: Not on medications due to his report that statins caused muscle pains  --See Above     Recent back abscess: He was hospitalized 7/17/2019 through 7/22/2019 for sepsis due to cellulitis and back abscess. He also had YORDY and Lisinopril, hydrochlorothiazide were held. He was given Vancomycin, Zosyn and Daptomycin. Surgery was consulted and OR debridement done. Cultures reportedly positive for MSSA. He was transitioned to Ancef, then to oral Augmentin at discharge and finished 14 days antibiotic therapy. He says the wound is still open but healing slowly and denies recent purulent drainage there.  --Wound nurse consulted for further evaluation while hospitalized    Edson Langford    Interval History   Pt reports continued improvement and better tolerance to activities.  Walking in the bernard without difficulty.  Denies sob presently.  Denies cp, lightheadedness, abd pain, nausea, palpitations.    -Data reviewed today: I reviewed all new labs and imaging results over the last 24 hours.    Physical Exam   Temp: 98  F (36.7  C) Temp src: Oral BP: (!) 142/93 Pulse: 96 Heart Rate: 93 Resp: 16 SpO2: 97 % O2 Device: None (Room air)    Vitals:    08/19/19 1844 08/20/19 0206 08/21/19 0600   Weight: 92.1 kg (203 lb) 91.2 kg (201 lb 1.6 oz) 89.3 kg (196 lb 12.8 oz)     Vital Signs with Ranges  Temp:  [97.5  F (36.4  C)-98.2  F (36.8  C)] 98  F (36.7  C)  Pulse:  [96] 96  Heart Rate:  [] 93  Resp:  [16-18] 16  BP: (135-170)/() 142/93  SpO2:  [95 %-97 %] 97 %  I/O last 3 completed shifts:  In: 380 [P.O.:380]  Out: 1800 [Urine:1800]    Constitutional: Alert, oriented to person, place, situation.   Cooperative, sitting up in the chair in NAD.   Respiratory:  Lungs CTAB.  No crackles, wheezes, or rhonchi, no labored breathing.  Cardiovascular:  Heart RRR, no MRG, trace LE edema.  GI:  Abdomen soft, NT/ND and with normoactive BS  Skin/Integumen:  Warm, dry, non-diaphoretic.  MSK: CMS x4 grossly intact.    Medications       bacitracin   Topical BID     carvedilol  12.5 mg Oral BID w/meals     furosemide  40 mg Intravenous BID     insulin aspart  1-3 Units Subcutaneous TID AC     insulin aspart  1-3 Units Subcutaneous At Bedtime     insulin glargine  40 Units Subcutaneous At Bedtime     losartan  25 mg Oral Daily     sodium chloride (PF)  3 mL Intracatheter Q8H       Data   Recent Labs   Lab 08/21/19  0550 08/20/19  1315 08/20/19  0905 08/20/19  0528 08/19/19  1916   WBC 9.3  --   --  10.3 10.8   HGB 14.1  --   --  13.1* 13.8   MCV 92  --   --  92 92     --   --  228 243     --   --  143 144   POTASSIUM 3.7  --   --  3.5 4.0   CHLORIDE 108  --   --  109 110*   CO2 31  --   --  28 27   BUN 26  --   --  20 21   CR 1.08  --   --  1.08 1.10   ANIONGAP 3  --   --  6 7   SUE 8.8  --   --  8.1* 8.7   *  --   --  122* 125*   ALBUMIN  --   --   --  2.8*  --    PROTTOTAL  --   --   --  6.3*  --    BILITOTAL  --   --   --  0.5  --    ALKPHOS  --   --   --  67  --    ALT  --   --   --  19  --    AST  --   --   --  15  --    TROPI  --  0.060* 0.067* 0.077* 0.072*       No results found for this or any previous visit (from the past 24 hour(s)).

## 2019-08-21 NOTE — PLAN OF CARE
BP elevated. Losartan increased by MD. Up independent in room. Denies CP, SOB. Mild TRUJILLO, LS diminished. TTE done today followed by Kasia scan. Dressing to wound on back changed. Some erythema at site with scant serosanguinous drainage. Plan to monitor BP and continue diuresing on IV lasix with probable change to PO lasix tomorrow per Cards.

## 2019-08-21 NOTE — PROGRESS NOTES
Patient tolerated procedure well.  Patient did not have chest pain. Developed dyspnea which has since resolved.

## 2019-08-21 NOTE — PLAN OF CARE
VSS. No complaints of pain. Pt up independent in room. Pt reports improved SOB. Pt slept well through out shift.

## 2019-08-21 NOTE — PROGRESS NOTES
[facility] Progress Note     Guzman San MD  08/21/2019         Interval History:      Feels much better, slept 6-7 hrs laying flat without orthopnea. Also less TRUJILLO on ambulation today       Assessment and Plan:      1.  Acute decompensated congestive heart failure:    Etiology likely uncontrolled hypertension.  Bedside prelim echo review suggest mildly reduced LVEF with mild global hypokinesia  2.  CAD risk factors of uncontrolled hypertension, diabetes, untreated dyslipidemia:  I will defer to primary inpatient medicine team to consider alternative antilipid therapy or even alternative statin therapy- LDL significantly elevated 190 03/2019- recommend that patient should be considered for PCSK9 inhibitor- will need approval from insurance likely as an o/p  3. Mild flat pattern troponin elevation- likely due to demand supply mismatch - clinically not an ACS  4. HTN- better, still elevated    Recommendations  Recommend increasing losartan to 50 mill grams,  daily continue carvedilol 12.5 mill grams twice daily.  For now continue IV Lasix.  Likely switch to oral tomorrow.  Had a long discussion patient regarding his clinical course.  There is definite improvement in his symptoms he appears much less volume overload now.  I discussed evaluation for coronary disease given risk factors.  We discussed option of coronary angiogram versus stress testing.  We discussed pros and cons of each strategy.  At this time given lack of anginal symptoms and most likely etiology of mild reduced LV systolic function from uncontrolled hypertension patient would like to proceed with stress testing which I think is very reasonable.  We will do a Lexiscan nuclear stress test.  I also expressed my concern regarding untreated dyslipidemia especially significantly elevated LDL.  Patient tells me that he has significant fatigue and myalgias responsive to statins in the past.  Given this level of LDL also with underlying diabetes I discussed  "with patient option of PCSK9 inhibitors.  Patient did express some concern about potential cost.  This needs to be worked through his insurance.  I recommend care coordinator to help review with his insurance about PCSK9 inhibitor.           Physical Exam:      Heart Rate: 93, Blood pressure (!) 142/93, pulse 96, temperature 98  F (36.7  C), temperature source Oral, resp. rate 16, height 1.803 m (5' 11\"), weight 89.3 kg (196 lb 12.8 oz), SpO2 97 %.  Vitals:    08/19/19 1844 08/20/19 0206 08/21/19 0600   Weight: 92.1 kg (203 lb) 91.2 kg (201 lb 1.6 oz) 89.3 kg (196 lb 12.8 oz)     Vital Signs with Ranges  Temp:  [97.5  F (36.4  C)-98.2  F (36.8  C)] 98  F (36.7  C)  Pulse:  [96] 96  Heart Rate:  [] 93  Resp:  [16-18] 16  BP: (135-170)/() 142/93  SpO2:  [95 %-97 %] 97 %  I/O's Last 24 hours  I/O last 3 completed shifts:  In: 380 [P.O.:380]  Out: 1800 [Urine:1800]  Gen- appears comfortable  Neck- JVP normal, HJR+  CVS- s1s2 normal, s4 heard, no m/r/g  Resp- decreased a/e b/l at bases but better than yesterday  Ext- 1+ pitting pedal edema- better than yesterday  Neuro- alert, oriented.             Medications:          bacitracin   Topical BID     carvedilol  12.5 mg Oral BID w/meals     furosemide  40 mg Intravenous BID     insulin aspart  1-3 Units Subcutaneous TID AC     insulin aspart  1-3 Units Subcutaneous At Bedtime     insulin glargine  40 Units Subcutaneous At Bedtime     losartan  25 mg Oral Once     [START ON 8/22/2019] losartan  50 mg Oral Daily     sodium chloride (PF)  3 mL Intracatheter Q8H     PRN Meds: acetaminophen, acetaminophen, bisacodyl, glucose **OR** dextrose **OR** glucagon, lidocaine 4%, lidocaine (buffered or not buffered), magnesium sulfate, magnesium sulfate, melatonin, naloxone, ondansetron **OR** ondansetron, polyethylene glycol, potassium chloride, potassium chloride with lidocaine, potassium chloride, potassium chloride, potassium chloride, senna-docusate **OR** " senna-docusate, sodium chloride (PF)         Data:      All new lab and imaging data was reviewed.   Recent Labs   Lab Test 08/21/19  0550 08/20/19  0528 08/19/19 1916   WBC 9.3 10.3 10.8   HGB 14.1 13.1* 13.8   MCV 92 92 92    228 243      Recent Labs   Lab Test 08/21/19  0550 08/20/19  0528 08/19/19 1916    143 144   POTASSIUM 3.7 3.5 4.0   CHLORIDE 108 109 110*   CO2 31 28 27   BUN 26 20 21   CR 1.08 1.08 1.10   ANIONGAP 3 6 7   SUE 8.8 8.1* 8.7   * 122* 125*     Recent Labs   Lab Test 08/20/19  1315 08/20/19  0905 08/20/19  0528   TROPI 0.060* 0.067* 0.077*        Guzman San MD  8/21/2019  Pager:  [phone]

## 2019-08-22 NOTE — PROGRESS NOTES
Patient returns in ongoing follow-up after the incision and drainage as well as debridement of a back abscess.  Overall he is doing well.  Continues with wound care as previously instructed.  No fevers or chills.  No significant ongoing discomfort or pain.    On examination: There remains some skin discoloration but not true cellulitis or infection.  The wound shows minimal fibrinous material that was sharply debrided.  This was redressed with bacitracin and dry gauze.    Ongoing wound care instructions were discussed.  We will follow-up again in approximately 6 weeks.

## 2019-08-22 NOTE — PROGRESS NOTES
.  [facility]  Cardiology Progress Note  Date of Service: 08/22/2019  Primary Cardiologist: will be Dr. San    Assessment & Plan    Jose Reid is a 59 year old male with past medical history significant for diabetes now on insulin, peripheral neuropathy, HTN, DLD, recent back cellulitis and abscess with sepsis due to MSSA admitted on 8/19/2019 with progressive TRUJILLO, orthopnea, and peripheral edema.      Assessment:  1.  Acute systolic heart failure, nt pro bnp only elevated at 1,100 but chest CT showed small bilateral pleural effusions   - EF 45-50%, nuc stress showed likely inferior attenuation- no anginal sx, suspect due to uncontrolled HTN   - trop + but flat, suspect demand   - I/o neg 4.7L   - admit wt 203, current wt 196, dry wt low 190s- this was wt early spring    2.  HTN, improving    3.  Hyperlipidemia, intolerant to statins   - per pharm notes need to trial zetia before psk9 inhibitors will be approved, will start here    4.  Other comorbidities, dm, recent sepsis, peripheral neuropathy     Plan:   1. Increase coreg to 18.75 mg bid  2. Add spironolactone 25 mg daily  3. Switch to po lasix today 40 mg bid  4. Repeat cxr today  5. Bmp and nt pro bnp in am to establish baseline  6. Outpt stress cardiac mri  7. Will arrange f/u with core clinic  8. Dispo- possible discharge tomorrow?    Karen Barrera PA-C  [facility]  Pager: [phone]     Interval History    Patient states he is much improved from admission although in general felt better yesterday.  Just does not have as much energy today does not feel like he can get as deep of breath.  He slept only slightly elevated and this is much improved from prior when he had to sleep sitting up at home.  His edema is improving right ankle still has some edema.  He denies chest pain.  He is anxious to get home but understands that we want him on p.o. diuretics for 24 hours.    Physical Exam   Temp: 98.5  F (36.9  C) Temp src: Oral BP: (!) 166/88    Heart Rate: 93 Resp: 16 SpO2: 97 % O2 Device: None (Room air)    Vitals:    08/20/19 0206 08/21/19 0600 08/22/19 0648   Weight: 91.2 kg (201 lb 1.6 oz) 89.3 kg (196 lb 12.8 oz) 89 kg (196 lb 3.2 oz)     Well-developed well-nourished gentleman in no acute distress who appears older than his stated age.  He is normocephalic and atraumatic.  Lungs are diminished in the left lower lobe otherwise clear bilaterally without wheezes rales or rhonchi.  Right leg with 1+ edema to lower shin left leg without edema skin is warm and dry.  I do not appreciate JVP at 30 degrees.    .Medications       bacitracin   Topical BID     carvedilol  12.5 mg Oral BID w/meals     furosemide  40 mg Intravenous BID     insulin aspart  1-3 Units Subcutaneous TID AC     insulin aspart  1-3 Units Subcutaneous At Bedtime     insulin glargine  40 Units Subcutaneous At Bedtime     losartan  50 mg Oral Daily     sodium chloride (PF)  3 mL Intracatheter Q8H       Data   Most Recent 3 CBC's:  Recent Labs   Lab Test 08/21/19  0550 08/20/19  0528 08/19/19  1916   WBC 9.3 10.3 10.8   HGB 14.1 13.1* 13.8   MCV 92 92 92    228 243     Most Recent 3 BMP's:  Recent Labs   Lab Test 08/22/19  0523 08/21/19  0550 08/20/19  0528    142 143   POTASSIUM 3.6 3.7 3.5   CHLORIDE 107 108 109   CO2 30 31 28   BUN 31* 26 20   CR 1.16 1.08 1.08   ANIONGAP 4 3 6   SUE 8.6 8.8 8.1*   * 114* 122*     Most Recent 3 Troponin's:  Recent Labs   Lab Test 08/20/19  1315 08/20/19  0905 08/20/19  0528   TROPI 0.060* 0.067* 0.077*     Last 24 hours labs reviewed   EKG: (reviewed personally) sinus tach    Imaging: ct chest   1.  No pulmonary embolism seen.  2.  Moderate to large bilateral pleural effusions with adjacent  compressive subsegmental atelectasis.    Tele: sinus    Echo:   1. Left ventricular systolic function is mildly reduced. The visual ejection  fraction is estimated at 45-50%.  2. There is mild global hypokinesia of the left ventricle.  3. There is  mild concentric left ventricular hypertrophy.  4. The right ventricle is normal in structure, function and size.  5. No evidence for significant valvular pathology.    Last ischemic eval: 8/21/19  Myocardial perfusion imaging using single isotope technique demonstrated fixed inferior count depression, probably due to soft tissue attenuation artifact, although I cannot exclude nontransmural infarction due to global wall motion abnormality. There is no ischemia identified on this study.   2. Gated images demonstrated mild global hypokinesis with normal left ventricular chamber size.  The left ventricular systolic function is mildly reduced, with an ejection fraction of 44%.  3. No previous study available for comparison    Device: none

## 2019-08-22 NOTE — PLAN OF CARE
Discharge Planner OT   Patient plan for discharge: home  Current status: Pt willing to work.  Hypertensive at rest, became better with some time but still hypertensive.  Pt ambulated to and from satellite (about 300 feet), 10 minutes on the treadmill at 1.2 mph, and did 10 stairs.  Hypertensive at end of session but lower still than at start.  Exertion rated at a 6/10.  Barriers to return to prior living situation: none for return home with family with heavier IADl's ie laundry, yard work, cleaning, etc.   Recommendations for discharge: home with possible OP WEL program and family assist  Rationale for recommendations: Pt continues with SOB with minimal activity cues for PLB. OP WEL program recommended for monitored progressive exercise and further CHF education.        Entered by: Viktor Álvarez 08/22/2019 11:40 AM

## 2019-08-22 NOTE — TELEPHONE ENCOUNTER
TE can be closed after patient reads if no response.    Thank you,  Sharlene CRENSHAW    [facility] Team [facility]

## 2019-08-22 NOTE — PROGRESS NOTES
[facility]    Hospitalist Progress Note    Date of Service (when I saw the patient): 08/22/2019    Assessment & Plan   Jose Reid is a markedly pleasant 59 year old gentleman with uncontrolled insulin-dependent Diabetes mellitus causing peripheral neuropathy, as well as hypertension, dyslipidemia, and recent hospitalization for back abscess and cellulitis due to MSSA, who presents with progressive dyspnea and leg edema and is found to have suspected acute diastolic CHF exacerbation as well as elevated troponin and accelerated hypertension.     Suspected acute diastolic CHF exacerbation  Troponin elevation, likely demand ischemia  Accelerated hypertension  He does not have a known history of CAD or CHF. He had a negative stress echo in 2013, and negative lower extremity KATHIE testing in 2015. He has been off several anti-hypertensives recently due to recent sepsis and he could have demand ischemia due to accelerated hypertension. We will rule out ACS.  --Telemetry monitoring  --Serial troponin 0.072-->0.077-->0.067  --TTE with mildly reduced LV function, EF 45-50%, mild global hypokinesis of left ventricle.  --Lexiscan ST without apparent ischemia.  --Cardiology following, appreciated.  --Continue 40 mg IV Lasix BID.   --Increase Losartan to 50 mg daily.  --Increase carvedilol to 18.75 mg twice daily  --Add spironolactone 25 mill grams daily   --Strict I and O's, daily weights, and low sodium diet.   --Incentive spirometer.  --Replace K and Mg as needed, monitor renal function.  --Discussed at length with patient and his wife about stress test results.  No ischemia noted.  Fixed inferior defect likely admission artifact.  Cardiology discussed with patient option of cardiac MRI to rule out inferior scar.  This can be done as an outpatient when he is clinically much improved so that he is able to tolerate cardiac MRI, several sequences require breath-holding.  --He has significant dyslipidemia with  highly elevated LDL, and per cardiology, he should be on PCSK9 inhibitors as he has history of intolerance to statins.  This has to be worked through insurance.  It looks like he may need to be on Zetia before he can qualify for PCSK9 inhibitor.  --If he continues to do well with rehab also, possible discharge tomorrow but we will reevaluate tomorrow.     Insulin dependent Diabetes: A1c 12.6 this month. It was 14 earlier in the year and he says he started insulin therapy 3/2019. He has peripheral neuropathy and at recent Podiatry visit it was noted he had bilateral foot drop, and he was referred to see a Neurologist, and the appointment is scheduled for 2 days from now.  --Continue Lantus 40 units at bedtime and ISS insulin  --Maintain close outpatient follow up with Neurology at discharge     Dyslipidemia: Not on medications due to his report that statins caused muscle pains  --See Above     Recent back abscess: He was hospitalized 7/17/2019 through 7/22/2019 for sepsis due to cellulitis and back abscess. He also had YORDY and Lisinopril, hydrochlorothiazide were held. He was given Vancomycin, Zosyn and Daptomycin. Surgery was consulted and OR debridement done. Cultures reportedly positive for MSSA. He was transitioned to Ancef, then to oral Augmentin at discharge and finished 14 days antibiotic therapy. He says the wound is still open but healing slowly and denies recent purulent drainage there.  --Wound nurse consulted for further evaluation while hospitalized    Edson Langford    Interval History   Walking in the bernard without difficulty.  Denies sob presently.  Denies cp, lightheadedness, abd pain, nausea, palpitations.  No new concerns.    -Data reviewed today: I reviewed all new labs and imaging results over the last 24 hours.    Physical Exam   Temp: 98.5  F (36.9  C) Temp src: Oral BP: (!) 154/94   Heart Rate: 90 Resp: 18 SpO2: 94 % O2 Device: None (Room air)    Vitals:    08/20/19 0206 08/21/19 0600  08/22/19 0648   Weight: 91.2 kg (201 lb 1.6 oz) 89.3 kg (196 lb 12.8 oz) 89 kg (196 lb 3.2 oz)     Vital Signs with Ranges  Temp:  [97.9  F (36.6  C)-98.5  F (36.9  C)] 98.5  F (36.9  C)  Heart Rate:  [] 90  Resp:  [16-18] 18  BP: (114-159)/() 154/94  SpO2:  [94 %-97 %] 94 %  I/O last 3 completed shifts:  In: 480 [P.O.:480]  Out: 2500 [Urine:2500]    Constitutional: Alert, oriented to person, place, situation.  Cooperative, sitting up in the chair in NAD.   Respiratory:  Lungs CTAB.  No crackles, wheezes, or rhonchi, no labored breathing.  Cardiovascular:  Heart RRR, no MRG, trace LE edema.  GI:  Abdomen soft, NT/ND and with normoactive BS  Skin/Integumen:  Warm, dry, non-diaphoretic.  MSK: CMS x4 grossly intact.    Medications       bacitracin   Topical BID     carvedilol  12.5 mg Oral BID w/meals     furosemide  40 mg Intravenous BID     insulin aspart  1-3 Units Subcutaneous TID AC     insulin aspart  1-3 Units Subcutaneous At Bedtime     insulin glargine  40 Units Subcutaneous At Bedtime     losartan  50 mg Oral Daily     sodium chloride (PF)  3 mL Intracatheter Q8H       Data   Recent Labs   Lab 08/22/19  0523 08/21/19  0550 08/20/19  1315 08/20/19  0905 08/20/19  0528 08/19/19  1916   WBC  --  9.3  --   --  10.3 10.8   HGB  --  14.1  --   --  13.1* 13.8   MCV  --  92  --   --  92 92   PLT  --  236  --   --  228 243    142  --   --  143 144   POTASSIUM 3.6 3.7  --   --  3.5 4.0   CHLORIDE 107 108  --   --  109 110*   CO2 30 31  --   --  28 27   BUN 31* 26  --   --  20 21   CR 1.16 1.08  --   --  1.08 1.10   ANIONGAP 4 3  --   --  6 7   SUE 8.6 8.8  --   --  8.1* 8.7   * 114*  --   --  122* 125*   ALBUMIN  --   --   --   --  2.8*  --    PROTTOTAL  --   --   --   --  6.3*  --    BILITOTAL  --   --   --   --  0.5  --    ALKPHOS  --   --   --   --  67  --    ALT  --   --   --   --  19  --    AST  --   --   --   --  15  --    TROPI  --   --  0.060* 0.067* 0.077* 0.072*       Recent Results  (from the past 24 hour(s))   NM Lexiscan stress test (nuc card)    Narrative    GATED MYOCARDIAL PERFUSION SCINTIGRAPHY WITH INTRAVENOUS PHARMACOLOGIC  VASODILATATION LEXISCAN -ONE DAY STUDY     8/21/2019 2:25 PM  KATLYN RAMIREZ  59 years  Male  1960.    Indication/Clinical History: Shortness of breath with exertion    Impression  1.  Myocardial perfusion imaging using single isotope technique  demonstrated fixed inferior count depression, probably due to soft  tissue attenuation artifact, although I cannot exclude nontransmural  infarction due to global wall motion abnormality. There is no ischemia  identified on this study.   2. Gated images demonstrated mild global hypokinesis with normal left  ventricular chamber size.  The left ventricular systolic function is  mildly reduced, with an ejection fraction of 44%.  3. No previous study available for comparison    Procedure  Pharmacologic stress testing was performed with Lexiscan at a rate of  0.08 mg/ml rapid bolus injection, for 15 seconds, 0.4 mg/5ml  intravenously. Low-level exercise was not performed along with the  vasodilator infusion.  The heart rate was 89 at baseline and cindy to  100 beats per minute during the Lexiscan infusion. The rest blood  pressure was 146/97 mmHg and was 114/69 mm Hg during Lexiscan  infusion. The patient experienced flushing and shortness of breath   during the test.    Myocardial perfusion imaging was performed at rest, approximately 45  minutes after the injection intravenously of 10.1 mCi of Tc-99m  Myoview. At peak pharmacologic effect, 10-20 seconds after Lexiscan,   the patient was injected intravenously with 30.0 mCi of  Tc-99m  Myoview. The post-stress tomographic imaging was performed  approximately 60 minutes after stress.    EKG Findings  The resting EKG demonstrated normal sinus rhythm with nonspecific  inferior and lateral ST and T-wave abnormalities. The stress EKG  demonstrated no significant  changes.    Tomographic Findings  Overall, the study quality is fair to poor, based on significant soft  tissue attenuation artifact . On the stress images, there is mild  inferior count depression. On the rest images, there is mild inferior  count depression . Gated images demonstrated mild global hypokinesis.  The left ventricular ejection fraction was calculated to be 44%. TID  was absent.    AMMY ZAIDI MD

## 2019-08-22 NOTE — CONSULTS
Medication coverage check for a PCSK9 inhibitor. Criteria rules suggest that patient must first try Zetia.     Hypercholesterolemia must also be defined as either heterozygous or homozygous.    Ludmila Stephens [facility]ia[facility]  Liaison Cell: [phone]

## 2019-08-23 NOTE — PROGRESS NOTES
[facility]    Hospitalist Progress Note    Date of Service (when I saw the patient): 08/23/2019    Assessment & Plan   Jose Reid is a markedly pleasant 59 year old gentleman with uncontrolled insulin-dependent Diabetes mellitus causing peripheral neuropathy, as well as hypertension, dyslipidemia, and recent hospitalization for back abscess and cellulitis due to MSSA, who presents with progressive dyspnea and leg edema and is found to have acute diastolic CHF exacerbation as well as elevated troponin and accelerated hypertension.     Acute diastolic CHF exacerbation  Troponin elevation, likely demand ischemia  Accelerated hypertension  He does not have a known history of CAD or CHF. He had a negative stress echo in 2013, and negative lower extremity KATHIE testing in 2015. He has been off several anti-hypertensives recently due to recent sepsis and he could have demand ischemia due to accelerated hypertension. We will rule out ACS.  --Telemetry monitoring  --Serial troponin 0.072-->0.077-->0.067  --TTE with mildly reduced LV function, EF 45-50%, mild global hypokinesis of left ventricle.  --Cardiology following, appreciated.  --Transitioned to Lasix 40 mg p.o. twice daily  --Increase Losartan to 50 mg daily.  --Increase carvedilol to 25 mg twice daily  --Continue spironolactone 25 mg daily   --Low sodium diet.   --Volume status, Net neg 5.7 L , down ~ 3 kg  --Creatinine and electrolytes stable  --Lexiscan stress test results reviewed. No ischemia noted.  Fixed inferior defect likely artifact.  Cardiology discussed with patient option of cardiac MRI to rule out inferior scar.  This can be done as an outpatient when he is clinically much improved so that he is able to tolerate cardiac MRI, several sequences require breath-holding.  --If he continues to do well with rehab and continuing to improve on oral diuretics, possible discharge tomorrow.     Insulin dependent Diabetes: A1c 12.6 this month. It  was 14 earlier in the year and he says he started insulin therapy 3/2019. He has peripheral neuropathy and at recent Podiatry visit it was noted he had bilateral foot drop, and he was referred to see a Neurologist as outpatient.  --Continue Lantus 40 units at bedtime and ISS insulin  --Maintain close outpatient follow up with Neurology at discharge     Dyslipidemia: Not on medications due to his report that statins caused muscle pains.  --He has significant dyslipidemia with highly elevated LDL, and per cardiology, he should be on PCSK9 inhibitors as he has history of intolerance to statins.  This has to be worked through insurance.  It looks like he may need to be on Zetia before he can qualify for PCSK9 inhibitor.     Recent back abscess: He was hospitalized 7/17/2019 through 7/22/2019 for sepsis due to cellulitis and back abscess. He also had YORDY and Lisinopril, hydrochlorothiazide were held. He was given Vancomycin, Zosyn and Daptomycin. Surgery was consulted and OR debridement done. Cultures reportedly positive for MSSA. He was transitioned to Ancef, then to oral Augmentin at discharge and finished 14 days antibiotic therapy. He says the wound is still open but healing slowly and denies recent purulent drainage there.  --Wound nurse consulted  Lower back wound: BID   1.  Cleanse with MicroKlenz.    2.  Fill in wound bed with bacitracin ointment  3. Cover with Mepilex border     DVT prophylaxis: PCDs and ambulation    CODE STATUS: Full code    Disposition: Likely discharge tomorrow pending cardiology recommendations if he continues to improve on oral diuretics and progresses with cardiac rehab.    Edson Langford    Interval History   Walking in the bernard without difficulty.  Denies sob presently.  Does feel some mild short of breath when lying in bed this morning.  Denies cp, lightheadedness, abd pain, nausea, palpitations.  No new concerns.  Hopeful for discharge tomorrow.    -Data reviewed today: I  reviewed all new labs and imaging results over the last 24 hours.    Physical Exam   Temp: 98  F (36.7  C) Temp src: Oral BP: (!) 153/93   Heart Rate: 87 Resp: 20 SpO2: 97 % O2 Device: None (Room air)    Vitals:    08/21/19 0600 08/22/19 0648 08/23/19 0601   Weight: 89.3 kg (196 lb 12.8 oz) 89 kg (196 lb 3.2 oz) 89 kg (196 lb 3.2 oz)     Vital Signs with Ranges  Temp:  [97.5  F (36.4  C)-98  F (36.7  C)] 98  F (36.7  C)  Heart Rate:  [] 87  Resp:  [16-20] 20  BP: (123-166)/() 153/93  SpO2:  [94 %-98 %] 97 %  I/O last 3 completed shifts:  In: 607 [P.O.:600; I.V.:7]  Out: 1250 [Urine:1250]    Constitutional: Alert, oriented to person, place, situation.  Cooperative, sitting up in the chair in NAD.   Respiratory:  Lungs CTAB.  No crackles, wheezes, or rhonchi, no labored breathing.  Cardiovascular:  Heart RRR, no MRG, trace LE edema.  GI:  Abdomen soft, NT/ND and with normoactive BS  Skin/Integumen:  Warm, dry, non-diaphoretic.  MSK: CMS x4 grossly intact.    Medications       bacitracin   Topical BID     carvedilol  18.75 mg Oral BID w/meals     ezetimibe  10 mg Oral At Bedtime     furosemide  40 mg Oral BID     insulin aspart  1-3 Units Subcutaneous TID AC     insulin aspart  1-3 Units Subcutaneous At Bedtime     insulin glargine  40 Units Subcutaneous At Bedtime     losartan  50 mg Oral Daily     sodium chloride (PF)  3 mL Intracatheter Q8H     spironolactone  25 mg Oral Daily       Data   Recent Labs   Lab 08/23/19  0558 08/22/19  0523 08/21/19  0550 08/20/19  1315 08/20/19  0905 08/20/19  0528   WBC 9.3  --  9.3  --   --  10.3   HGB 14.1  --  14.1  --   --  13.1*   MCV 92  --  92  --   --  92     --  236  --   --  228    141 142  --   --  143   POTASSIUM 3.8 3.6 3.7  --   --  3.5   CHLORIDE 107 107 108  --   --  109   CO2 31 30 31  --   --  28   BUN 35* 31* 26  --   --  20   CR 1.17 1.16 1.08  --   --  1.08   ANIONGAP 4 4 3  --   --  6   SUE 8.8 8.6 8.8  --   --  8.1*   * 154* 114*   --   --  122*   ALBUMIN  --   --   --   --   --  2.8*   PROTTOTAL  --   --   --   --   --  6.3*   BILITOTAL  --   --   --   --   --  0.5   ALKPHOS  --   --   --   --   --  67   ALT  --   --   --   --   --  19   AST  --   --   --   --   --  15   TROPI  --   --   --  0.060* 0.067* 0.077*       Recent Results (from the past 24 hour(s))   XR Chest 2 Views    Narrative    CHEST TWO VIEWS   8/22/2019 2:08 PM     HISTORY: Pleural effusions, reassess after diureses.    COMPARISON: 8/19/2019.      Impression    IMPRESSION: Hypoinflated lungs. No focal airspace disease identified.  Stable cardiac silhouette. Improved vascular and interstitial  prominence compared to the prior study may represent improvement of  pulmonary edema.    HERMAN BOSWELL MD

## 2019-08-23 NOTE — PLAN OF CARE
Discharge Planner OT   Patient plan for discharge: Home  Current status:  Pt completed 11.5 minutes on the treadmill at a speed of 1.2 MPH. Pt completed 10 stairs with independence. Hypertensive at rest and with activity, see vitals flow sheet for details.   Barriers to return to prior living situation: none for return home with family A  Recommendations for discharge: home with OP WEL program and family assist for heavier IADl's ie laundry, yard work, cleaning, etc.  Rationale for recommendations: OP WEL program recommended for monitored progressive exercise and further CHF education.        Entered by: Oneil Tatum 08/23/2019 11:29 AM

## 2019-08-23 NOTE — PLAN OF CARE
A&OX4,BP after walking 150/95, recheck 123/80, denies SOB/CP, on room air. Tele-SR, on mod CHO diet, up independent.  Wound at the back covered with foam, C/D/I.

## 2019-08-23 NOTE — PLAN OF CARE
A/O, VSS ex BP 140s-160s/90s. O2sats 97% on RA; fine crackles in bases. Tele SR. Denies pain. Indp, steady. , 142, 153; sliding scale insulin given. Plan for likely discharge tomorrow.

## 2019-08-23 NOTE — PROGRESS NOTES
[facility]  Cardiology Progress Note    Date of Service (when I saw the patient): 08/23/2019       Assessment & Plan   Jose Reid is a 59 year old male who was admitted on 8/19/2019 with progressive TRUJILLO, orthopnea, and peripheral edema. He carries a PMH significant for diabetes, peripheral neuropathy, hypertension, dyslipidemia, and recent back abscess and cellulitis due to MSSA.     1.  : Acute Diastolic Heart Failure   - Echo demonstrated EF 45-50%, mild global hypokinesia, mild LVH, normal RV function, size and structure, no evidence of valvular disease.   - Volume status, Net neg 5.7 L , down ~ 3 kg, on PO lasix  - Creatinine stable 1.17, potassium 3.8   -   - Continue on spironolactone, losartan, Carvedilol and lasix.   - Follow up chest x ray demonstrated improved vascular and interstitial prominence compared to previous study.   - Right LE edema improving   - Follow up with cardiology in 1 week post discharge, CORE enrollment scheduled.   - Educated on home weight and blood pressure monitoring.   - Anticipate discharge home today    2.  : Hypertension  - Elevated today, Will increase Carvedilol to 25mg BID     3.  : Hyperlipidemia   - Intolerant to statins, on Zetia  - Consider PCSK9 agents outpatient     4.  Mildly elevated troponin  - Chest pain free  - Peaked at 0.077, trending down, likely demand ischemia  - Follow up Nuclear stress test demonstrated fixed inferior count depression with no ischemia identified.   - Recommend out patient MRI to rule out inferior scar.       Blanca Le, PRATIMA CNP  Text Page  (M-F, 7:30 am - 4:00 pm)    Interval History   Mild shortness of breath with cardiac rehab today, denies chest pain, palpitations, PND, presyncope. 1+ edema to right lower extremity. Tele demonstrates NSR. BP/HR elevated, carvedilol increased. Anticipate discharge today.     Physical Exam   Temp: 98  F (36.7  C) Temp src: Oral BP: (!) 153/93   Heart Rate: 87 Resp: 20  SpO2: 97 % O2 Device: None (Room air)    Vitals:    08/21/19 0600 08/22/19 0648 08/23/19 0601   Weight: 89.3 kg (196 lb 12.8 oz) 89 kg (196 lb 3.2 oz) 89 kg (196 lb 3.2 oz)     Vital Signs with Ranges  Temp:  [97.5  F (36.4  C)-98  F (36.7  C)] 98  F (36.7  C)  Heart Rate:  [] 87  Resp:  [16-20] 20  BP: (123-165)/() 153/93  SpO2:  [94 %-98 %] 97 %  I/O last 3 completed shifts:  In: 607 [P.O.:600; I.V.:7]  Out: 1250 [Urine:1250]    GEN:  In general, this is a well nourished male in no acute distress. Patient ambulatory.  HEENT:  Pupils equal, round. Sclerae nonicteric. Clear oropharynx. Mucous membranes moist.  NECK: Supple, no masses appreciated. Trachea midline. No JVD   C/V:  Regular rate and rhythm, no murmur, rub or gallop. No S3 or RV heave.   RESP: Respirations are unlabored. No use of accessory muscles. Decreased bases bilaterally  GI: Abdomen soft, nontender, nondistended. No HSM appreciated.   EXTREM: 1+ pitting right LE edema. No cyanosis or clubbing.  NEURO: Alert and oriented, cooperative. Gait stable. No obvious focal deficits.   PSYCH: Normal affect.  SKIN: Warm and dry. No rashes or petechiae appreciated.       Medications       bacitracin   Topical BID     carvedilol  18.75 mg Oral BID w/meals     ezetimibe  10 mg Oral At Bedtime     furosemide  40 mg Oral BID     insulin aspart  1-3 Units Subcutaneous TID AC     insulin aspart  1-3 Units Subcutaneous At Bedtime     insulin glargine  40 Units Subcutaneous At Bedtime     losartan  50 mg Oral Daily     sodium chloride (PF)  3 mL Intracatheter Q8H     spironolactone  25 mg Oral Daily       Data   Reviewed       Blanca Le, PRATIMA CNP 8/23/2019

## 2019-08-24 NOTE — PLAN OF CARE
A&OX4,VSS, denies SOB/CP, on room air. Tele-SR, on mod CHO diet, up independent.  Wound at the back covered with foam, C/D/I.  and 118.

## 2019-08-24 NOTE — DISCHARGE SUMMARY
[facility]  Hospitalist Discharge Summary       Date of Admission:  8/19/2019  Date of Discharge:  8/24/2019  Discharging Provider: Idris Raygoza MD      Discharge Diagnoses   Acute diastolic CHF exacerbation  Demand ischemia  Accelerated hypertension    Follow-ups Needed After Discharge   Follow-up Appointments     Follow-up and recommended labs and tests       Follow up with primary care provider, REINIER PINO, within 7 days to   evaluate medication change, to evaluate treatment change, for hospital   follow- up and regarding new diagnosis.  The following labs/tests are   recommended: Have already been ordered.    Follow up with cards as well per their direction         {Additional follow-up instructions/to-do's for PCP:  Will need labs and vital signs check ups    Unresulted Labs Ordered in the Past 30 Days of this Admission     Date and Time Order Name Status Description    8/23/2019 0558 T4 free In process       These results will be followed up by Cardiology    Discharge Disposition   Discharged to home  Condition at discharge: Stable    Hospital Course   Jose Reid is a markedly pleasant 59 year old gentleman with uncontrolled insulin-dependent Diabetes mellitus causing peripheral neuropathy, as well as hypertension, dyslipidemia, and recent hospitalization for back abscess and cellulitis due to MSSA, who presents with progressive dyspnea and leg edema and is found to have acute diastolic CHF exacerbation as well as elevated troponin and accelerated hypertension.     Acute diastolic CHF exacerbation  Troponin elevation, likely demand ischemia  Accelerated hypertension  He does not have a known history of CAD or CHF. He had a negative stress echo in 2013, and negative lower extremity KATHIE testing in 2015. He has been off several anti-hypertensives recently due to recent sepsis and he could have demand ischemia due to accelerated hypertension. He was ruled out for ACS with serial  trops.  Lexiscan stress test results reviewed. No ischemia noted.  Fixed inferior defect likely artifact.  Cardiology discussed with patient option of cardiac MRI to rule out inferior scar.  This can be done as an outpatient when he is clinically much improved so that he is able to tolerate cardiac MRI, several sequences require breath-holding.  TTE with mildly reduced LV function, EF 45-50%, mild global hypokinesis of left ventricle.  Cardiology following. Transitioned to Lasix 40 mg p.o. twice daily, Losartan to 50 mg daily, carvedilol to 25 mg twice daily, and spironolactone 25 mg daily      Insulin dependent Diabetes: A1c 12.6 this month. It was 14 earlier in the year and he says he started insulin therapy 3/2019. He has peripheral neuropathy and at recent Podiatry visit it was noted he had bilateral foot drop, and he was referred to see a Neurologist as outpatient.  Continue Lantus 40 units at bedtime and ISS insulin.  Maintain close outpatient follow up with Neurology at discharge     Dyslipidemia: Not on medications due to his report that statins caused muscle pains.  He has significant dyslipidemia with highly elevated LDL, and per cardiology, he should be on PCSK9 inhibitors as he has history of intolerance to statins.  This has to be worked through insurance and on follow up.  It looks like he may need to be on Zetia before he can qualify for PCSK9 inhibitor.     Recent back abscess: He was hospitalized 7/17/2019 through 7/22/2019 for sepsis due to cellulitis and back abscess. He also had YORDY and Lisinopril, hydrochlorothiazide were held. He was given Vancomycin, Zosyn and Daptomycin. Surgery was consulted and OR debridement done. Cultures reportedly positive for MSSA. He was transitioned to Ancef, then to oral Augmentin at discharge and finished 14 days antibiotic therapy. He says the wound is still open but healing slowly and denies recent purulent drainage there.  Wound nurse consulted.       Consultations This Hospital Stay   CARDIAC REHAB IP CONSULT  CARE COORDINATOR IP CONSULT  NUTRITION SERVICES ADULT IP CONSULT  CARDIOLOGY IP CONSULT  WOUND OSTOMY CONTINENCE NURSE  IP CONSULT  PHARMACY LIAISON FOR MEDICATION COVERAGE CONSULT    Code Status   Full Code    Time Spent on this Encounter   I, Idris Raygoza MD, personally saw the patient today and spent greater than 30 minutes discharging this patient.       Idris Raygoza MD  [facility]  ______________________________________________________________________    Physical Exam   Vital Signs: Temp: 97.8  F (36.6  C) Temp src: Oral BP: (!) 136/90 Pulse: 84 Heart Rate: 103 Resp: 16 SpO2: 98 % O2 Device: None (Room air)    Weight: 197 lbs 4.8 oz  Constitutional: Alert, oriented to person, place, situation.  Cooperative, sitting up in the chair in NAD.   Respiratory:  Lungs CTAB.  No crackles, wheezes, or rhonchi, no labored breathing.  Cardiovascular:  Heart RRR, no MRG, trace LE edema.  GI:  Abdomen soft, NT/ND and with normoactive BS  Skin/Integumen:  Warm, dry, non-diaphoretic.  MSK: CMS x4 grossly intact.  Neuro: CNs intact  Psych:  Appropriate affect  HEENT:  PERRL       Primary Care Physician   REINIER PINO    Discharge Orders      Basic metabolic panel     N terminal pro BNP outpatient     Hemoglobin    Last Lab Result: Hemoglobin (g/dL)       Date                     Value                 08/21/2019               14.1             ----------     Follow-Up with cardiac sub-specialty clinic      Reason for your hospital stay    Elevated BP and CHF exacerbation on many new medications.     Follow-up and recommended labs and tests     Follow up with primary care provider, REINIER PINO, within 7 days to evaluate medication change, to evaluate treatment change, for hospital follow- up and regarding new diagnosis.  The following labs/tests are recommended: Have already been ordered.    Follow up with cards as well per their  direction     Activity    Your activity upon discharge: activity as tolerated     Full Code     Diet    Follow this diet upon discharge: Orders Placed This Encounter      Combination Diet Regular Diet Adult; 6776-8867 Calories: Moderate Consistent CHO (4-6 CHO units/meal); 2 gm NA Diet; No Caffeine Diet       Significant Results and Procedures   Results for orders placed or performed during the hospital encounter of 08/19/19   CT Chest Pulmonary Embolism w Contrast    Narrative    CT CHEST PULMONARY EMBOLISM WITH CONTRAST  8/19/2019 8:19 PM    HISTORY:  Pulmonary embolism suspected, intermediate prob, positive  D-dimer.    TECHNIQUE: Scans obtained from the apices through the diaphragm with  IV contrast. 73 mL Isovue-370 IV injected. Radiation dose for this  scan was reduced using automated exposure control, adjustment of the  mA and/or kV according to patient size, or iterative reconstruction  technique.    COMPARISON:  None available.    FINDINGS:  Vascular: No acute thoracic aortic abnormality. No evidence for  pulmonary embolism.    Lungs and pleura: Moderate to large bilateral pleural effusions with  adjacent compressive subsegmental atelectasis. No suspicious pulmonary  nodule or mass is seen.    Lymph nodes: No enlarged lymph nodes.    Additional findings: The visualized portions of the thyroid gland are  unremarkable. Heart size is normal. No pericardial effusion is seen.  Visualized portions of the upper abdomen are unremarkable. Mild,  multilevel degenerative changes seen in the spine.      Impression    IMPRESSION:   1.  No pulmonary embolism seen.  2.  Moderate to large bilateral pleural effusions with adjacent  compressive subsegmental atelectasis.    TOMI GELLER MD   NM Lexiscan stress test (nuc card)    Narrative    GATED MYOCARDIAL PERFUSION SCINTIGRAPHY WITH INTRAVENOUS PHARMACOLOGIC  VASODILATATION LEXISCAN -ONE DAY STUDY     8/21/2019 2:25 PM  KATLYN RAMIREZ  59 years  Male   1960.    Indication/Clinical History: Shortness of breath with exertion    Impression  1.  Myocardial perfusion imaging using single isotope technique  demonstrated fixed inferior count depression, probably due to soft  tissue attenuation artifact, although I cannot exclude nontransmural  infarction due to global wall motion abnormality. There is no ischemia  identified on this study.   2. Gated images demonstrated mild global hypokinesis with normal left  ventricular chamber size.  The left ventricular systolic function is  mildly reduced, with an ejection fraction of 44%.  3. No previous study available for comparison    Procedure  Pharmacologic stress testing was performed with Lexiscan at a rate of  0.08 mg/ml rapid bolus injection, for 15 seconds, 0.4 mg/5ml  intravenously. Low-level exercise was not performed along with the  vasodilator infusion.  The heart rate was 89 at baseline and cindy to  100 beats per minute during the Lexiscan infusion. The rest blood  pressure was 146/97 mmHg and was 114/69 mm Hg during Lexiscan  infusion. The patient experienced flushing and shortness of breath   during the test.    Myocardial perfusion imaging was performed at rest, approximately 45  minutes after the injection intravenously of 10.1 mCi of Tc-99m  Myoview. At peak pharmacologic effect, 10-20 seconds after Lexiscan,   the patient was injected intravenously with 30.0 mCi of  Tc-99m  Myoview. The post-stress tomographic imaging was performed  approximately 60 minutes after stress.    EKG Findings  The resting EKG demonstrated normal sinus rhythm with nonspecific  inferior and lateral ST and T-wave abnormalities. The stress EKG  demonstrated no significant changes.    Tomographic Findings  Overall, the study quality is fair to poor, based on significant soft  tissue attenuation artifact . On the stress images, there is mild  inferior count depression. On the rest images, there is mild inferior  count depression .  Gated images demonstrated mild global hypokinesis.  The left ventricular ejection fraction was calculated to be 44%. TID  was absent.    AMMY ZAIDI MD   XR Chest 2 Views    Narrative    CHEST TWO VIEWS   8/22/2019 2:08 PM     HISTORY: Pleural effusions, reassess after diureses.    COMPARISON: 8/19/2019.      Impression    IMPRESSION: Hypoinflated lungs. No focal airspace disease identified.  Stable cardiac silhouette. Improved vascular and interstitial  prominence compared to the prior study may represent improvement of  pulmonary edema.    HERMAN BOSWELL MD       Discharge Medications   Current Discharge Medication List      START taking these medications    Details   carvedilol (COREG) 25 MG tablet Take 1 tablet (25 mg) by mouth 2 times daily (with meals)  Qty: 60 tablet, Refills: 0    Associated Diagnoses: Acute on chronic congestive heart failure, unspecified heart failure type (H); Elevated troponin; Acute on chronic combined systolic and diastolic congestive heart failure (H); Essential hypertension      ezetimibe (ZETIA) 10 MG tablet Take 1 tablet (10 mg) by mouth At Bedtime  Qty: 30 tablet, Refills: 0    Associated Diagnoses: Type 2 diabetes, HbA1c goal < 7% (H)      furosemide (LASIX) 40 MG tablet Take 1 tablet (40 mg) by mouth daily  Qty: 30 tablet, Refills: 0    Associated Diagnoses: Acute on chronic combined systolic and diastolic congestive heart failure (H)      losartan (COZAAR) 50 MG tablet Take 1 tablet (50 mg) by mouth daily  Qty: 30 tablet, Refills: 0    Associated Diagnoses: Acute on chronic congestive heart failure, unspecified heart failure type (H); Elevated troponin; Acute on chronic combined systolic and diastolic congestive heart failure (H); Essential hypertension      spironolactone (ALDACTONE) 25 MG tablet Take 1 tablet (25 mg) by mouth daily  Qty: 30 tablet, Refills: 0    Associated Diagnoses: Acute on chronic congestive heart failure, unspecified heart failure type (H); Elevated  troponin; Acute on chronic combined systolic and diastolic congestive heart failure (H); Essential hypertension         CONTINUE these medications which have NOT CHANGED    Details   aspirin 81 MG EC tablet Take 81 mg by mouth At Bedtime   Qty: 90 tablet, Refills: 3      insulin aspart (NOVOLOG PEN) 100 UNIT/ML pen Pre-Meal 140-189 =1 unit, 190-239 =2 units, 240-289 =3 units, 290-339 =4 units, = or >340 =5 units  Qty: 27 mL, Refills: 0    Associated Diagnoses: Type 2 diabetes mellitus with other circulatory complication, unspecified whether long term insulin use (H)      insulin glargine (LANTUS PEN) 100 UNIT/ML pen Inject 40 Units Subcutaneous At Bedtime  Qty: 3 mL, Refills: 1    Associated Diagnoses: Back abscess      vitamin B complex with vitamin C (STRESS TAB) tablet Take 1 tablet by mouth At Bedtime      Vitamin D, Cholecalciferol, 1000 units TABS Take 2 tablets by mouth At Bedtime strength unknown       cyanocobalamin (CYANOCOBALAMIN) 1000 MCG/ML injection Inject 1,000 mcg into the muscle every 3 days          STOP taking these medications       metoprolol succinate ER (TOPROL-XL) 50 MG 24 hr tablet Comments:   Reason for Stopping:             Allergies   Allergies   Allergen Reactions     Exenatide Rash     Hmg-Coa-R Inhibitors Muscle Pain (Myalgia) and Other (See Comments)     Extreme Fatigue       Amlodipine      Other reaction(s): Dizziness  Dizzy       Hctz [Hydrochlorothiazide]

## 2019-08-24 NOTE — PROGRESS NOTES
A/O, VSS, O2sats 97% on RA. Tele SR. States breathing improved today; edema decreased from yesterday. All discharge instructions, prescriptions, and personal belongings given to pt. All questions answered. Pt d/c to home via family.

## 2019-08-24 NOTE — PLAN OF CARE
Discharge Planner OT   Patient plan for discharge: Home  Current status: Pt completed 12 minutes on the treadmill at a speed of 1.2 MPH.   Barriers to return to prior living situation: none for return home with family A  Recommendations for discharge:  home with OP WEL program and family assist for heavier IADl's ie laundry, yard work, cleaning, etc.  Rationale for recommendations: OP WEL program recommended for monitored progressive exercise and further CHF education.        Entered by: Oneil Tatum 08/24/2019 9:47 AM

## 2019-08-24 NOTE — PROGRESS NOTES
[facility] Progress Note     Guzman San MD  08/24/2019         Interval History:      Feeling well, slept without any issues, no orthopnea, ambulating without any shortness of breath actually did another 12 minutes of treadmill today without any issues, no shortness of breath, orthopnea or chest pain or dizziness.  Telemetry shows no arrhythmia.  Creatinine slightly worse.  Weight stable.       Assessment and Plan:      A very pleasant 59-year-old gentleman with newly diagnosed congestive heart failure with borderline reduced LV systolic function with LVEF around 45 to 50% with global mild hypokinesia, etiology likely uncontrolled hypertension.  Clinically no anginal symptoms.  Stress test showing fixed inferior defect -could be attenuation artifact.  No angina noted.  Overall he has done quite well with diuresis and gradual up titration of cardiomyopathy medication and antihypertensive medications.  Blood pressure intermittently slightly high but much better than before.  With the creatinine slightly worse I recommend decreasing Lasix to 40 mg daily but continue rest of the cardiac medications that include carvedilol 25 mg twice daily, losartan 50 mg daily and spironolactone.  He will need to have close outpatient follow-up with repeat BMP within a week.  Consider outpatient cardiac MRI as a fixed inferior defect was noted on nuclear stress test which is likely attenuation artifact versus possible nontransmural infarct.  He will also likely need PCSK9 inhibitor given significantly elevated LDL and intolerance to statins.    1.  Acute systolic congestive heart failure with LVEF borderline reduced 45 to 50%.  Likely etiology uncontrolled hypertension.  Now compensated appears euvolemic- weight today 197 pounds.  2.  Uncontrolled hypertension.  This has been going for last several months.  Likely etiology of cardiomyopathy.  Much better now.  May need some more up titration as outpatient.  2.  Some mild increase in  "creatinine.  This needs to be watched in future.  I recommend decreasing Lasix to 40 mg daily.  3.  Significant dyslipidemia with  earlier this year, history of intolerance to statins.  Will likely need PCSK9 inhibitor.  Approval process started inpatient.    Recommendations  Decrease Lasix to 40 mg daily  Continue carvedilol 25 mill grams twice daily, losartan 50 mg daily, Spironolactone 35 mill grams daily, Zetia  Discussed in detail with patient about importance of salt restriction, less than 2 g of sodium per day, daily weights, to take an extra 40 mg of Lasix if more than 3 pounds of weight weight gain within a day or more than 5 pounds within a week.  Patient would need close follow-up, I recommend follow-up in cardiology clinic with ENRIQUETA in 1 week with a BMP.    Plan discussed in detail with patient.    Cardiology will sign off.  Please feel free to call with any question.           Physical Exam:      Heart Rate: 103, Blood pressure (!) 136/90, pulse 84, temperature 97.8  F (36.6  C), temperature source Oral, resp. rate 16, height 1.803 m (5' 11\"), weight 89.5 kg (197 lb 4.8 oz), SpO2 98 %.  Vitals:    08/22/19 0648 08/23/19 0601 08/24/19 0205   Weight: 89 kg (196 lb 3.2 oz) 89 kg (196 lb 3.2 oz) 89.5 kg (197 lb 4.8 oz)     Vital Signs with Ranges  Temp:  [97.5  F (36.4  C)-98.5  F (36.9  C)] 97.8  F (36.6  C)  Pulse:  [84] 84  Heart Rate:  [] 103  Resp:  [16-18] 16  BP: (123-156)/(80-91) 136/90  SpO2:  [94 %-99 %] 98 %  I/O's Last 24 hours  I/O last 3 completed shifts:  In: 700 [P.O.:700]  Out: 700 [Urine:700]  General patient appears comfortable  Neck normal JVP, negative hepatojugular reflux  Cardiovascular system S1-S2 normal no murmur rub or gallop  Respiratory system clear to auscultation bilaterally  Extremity minimal pitting pedal edema right lower extremity.  Neurological alert, oriented             Medications:          bacitracin   Topical BID     carvedilol  25 mg Oral BID w/meals     " ezetimibe  10 mg Oral At Bedtime     [START ON 8/25/2019] furosemide  40 mg Oral Daily     insulin aspart  1-3 Units Subcutaneous TID AC     insulin aspart  1-3 Units Subcutaneous At Bedtime     insulin glargine  40 Units Subcutaneous At Bedtime     [START ON 8/25/2019] losartan  50 mg Oral Daily     sodium chloride (PF)  3 mL Intracatheter Q8H     spironolactone  25 mg Oral Daily     PRN Meds: acetaminophen, acetaminophen, bisacodyl, glucose **OR** dextrose **OR** glucagon, lidocaine 4%, lidocaine (buffered or not buffered), magnesium sulfate, magnesium sulfate, melatonin, naloxone, ondansetron **OR** ondansetron, polyethylene glycol, potassium chloride, potassium chloride with lidocaine, potassium chloride, potassium chloride, potassium chloride, senna-docusate **OR** senna-docusate, sodium chloride (PF)         Data:      All new lab and imaging data was reviewed.   Recent Labs   Lab Test 08/23/19  0558 08/21/19  0550 08/20/19  0528   WBC 9.3 9.3 10.3   HGB 14.1 14.1 13.1*   MCV 92 92 92    236 228      Recent Labs   Lab Test 08/24/19  0556 08/23/19  0558 08/22/19  0523    142 141   POTASSIUM 4.0 3.8 3.6   CHLORIDE 107 107 107   CO2 30 31 30   BUN 37* 35* 31*   CR 1.32* 1.17 1.16   ANIONGAP 4 4 4   SUE 8.5 8.8 8.6   * 141* 154*     Recent Labs   Lab Test 08/20/19  1315 08/20/19  0905 08/20/19  0528   TROPI 0.060* 0.067* 0.077*        Guzman San MD  8/24/2019  Pager:  [phone]

## 2019-08-25 NOTE — PLAN OF CARE
Occupational Therapy Discharge Summary    Reason for therapy discharge:    Discharged to home.    Progress towards therapy goal(s). See goals on Care Plan in [facility] electronic health record for goal details.  Goals not met.  Barriers to achieving goals:   discharge from facility.    Therapy recommendation(s):    Continued therapy is recommended.  Rationale/Recommendations:  home with OP WEL program and family assist for heavier IADl's ie laundry, yard work, cleaning, etc..

## 2019-08-26 NOTE — TELEPHONE ENCOUNTER
Patient was seen today and singed MAYRA. MAYRA faxed to HIM for scanning.    Thank you,  Sharlene CRENSHAW    NE Team [place]ty

## 2019-08-26 NOTE — LETTER
[facility]  Complex Care Plan  About Me:    Patient Name:  Jose Reid    YOB: 1960  Age:         59 year old   Ryan MRN:    [id number] Telephone Information:  Home Phone [phone]   Mobile [phone]       Address:  [address]  [facility] [address] Email address:  [email or web address]      Emergency Contact(s)    Name Relationship Lgl Grd Work Phone Home Phone Mobile Phone   BEREKET GUIDO Spouse   [phone]           Primary language:  English     needed? No   [employer]:  [phone] op. 1  Other communication barriers: None  Preferred Method of Communication:     Current living arrangement: I live in a private home with spouse  Mobility Status/ Medical Equipment: Independent w/Device    Health Maintenance  Health Maintenance Reviewed: Due/Overdue   Health Maintenance Due   Topic Date Due     PREVENTIVE CARE VISIT  1960     HF ACTION PLAN  1960     ADVANCE CARE PLANNING  1960     EYE EXAM  1960     COLONOSCOPY  03/14/1971     ZOSTER IMMUNIZATION (1 of 2) 03/14/2011     PHQ-2  01/01/2019         My Access Plan  Medical Emergency 911   Primary Clinic Line [facility] - [phone]   24 Hour Appointment Line [phone] or  [phone]) (toll-free)   24 Hour Nurse Line [phone] (toll-free)   Preferred Urgent Care     Preferred Hospital [facility]  [phone]   Preferred Pharmacy [facility] [address] [place] [address]     Behavioral Health Crisis Line The National Suicide Prevention Lifeline at 1-203.200.2499 or 911             My Care Team Members  Patient Care Team       Relationship Specialty Notifications Start End    Marcial Larry PA-C PCP - General Physician Assistant All results, Admissions 3/1/19     Phone: [phone] Fax: [phone] [facility] [address]    Marcial Larry PA-C Assigned PCP   2/3/19     Phone: [phone] Fax: [phone] [address] [facility] [address]    Winston Gu, RN Lead Care Coordinator Primary Care - CC Admissions 8/26/19     Phone: [phone] Fax: [phone]                My Care Plans  Self Management and Treatment Plan  Goals and (Comments)  Goals        Blood Pressure    Blood Pressure below 140/90     Related Problems    HTN (hypertension)       General    #1  Functional (pt-stated)     Notes - Note created  8/26/2019  9:43 AM by Winston Gu, RN    Goal Statement: I will walk on the treadmill for 10 minutes and ride the stationary bike for 10 minutes at least 5/7 days per week to work on increasing strength and stamina.  Measure of Success: verbal response from the patient that he is exercising using the above parameters.  Supportive Steps to Achieve: Encouraged to work in small increments and few days to begin with and gradually increase.  Barriers: SOB due to CHF occasionally  Strengths: motivated to increase stamina.  Date to Achieve By: 8/26/2020  Patient expressed understanding of goal: yes                   Action Plans on File:                       Advance Care Plans/Directives Type:        My Medical and Care Information  Problem List   Patient Active Problem List   Diagnosis     Type 2 diabetes, HbA1c goal < 7% (H)     Hyperlipidemia with target LDL less than 100     Obesity     Back abscess     HTN (hypertension)     CHF exacerbation (H)      Current Medications and Allergies:  See printed Medication Report.    Care Coordination Start Date: 8/26/2019   Frequency of Care Coordination: 2 weeks   Form Last Updated: 08/26/2019

## 2019-08-26 NOTE — LETTER
[place] CARE COORDINATION  [address] [facility] [address]    August 26, 2019    Jose Reid  [address]  [facility] [address]      Dear Jose,    I am a clinic care coordinator who works with REINIER PINO PA-C at [facility]. I wanted to thank you for spending the time to talk with me.  I wanted to introduce myself and provide you with my contact information so that you can call me with questions or concerns about your health care. Below is a description of clinic care coordination and how I can further assist you.     The clinic care coordinator is a registered nurse and/or  who understand the health care system. The goal of clinic care coordination is to help you manage your health and improve access to the [facility] in the most efficient manner. The registered nurse can assist you in meeting your health care goals by providing education, coordinating services, and strengthening the communication among your providers. The  can assist you with financial, behavioral, psychosocial, chemical dependency, counseling, and/or psychiatric resources.    Please feel free to contact me at [phone], with any questions or concerns. We at [facility] are focused on providing you with the highest-quality healthcare experience possible and that all starts with you.     Sincerely,     Winston Haddad MSN, RN, PHN, [facility]   Primary Care Clinical RN Care Coordinator  [facility]   [email or web address]  Office: [phone]    Enclosed: I have enclosed a copy of the Complex Care Plan. This has helpful information and goals that we have talked about. Please keep this in an easy to access place to use as needed.

## 2019-08-26 NOTE — PROGRESS NOTES
Subjective     Jose Reid is a 59 year old male who presents to clinic today for the following health issues:    HPI       Hospital Follow-up Visit:    Hospital/Nursing Home/IP Rehab Facility: [facility]  Date of Admission: 08/19/2019  Date of Discharge: 08/24/2019  Reason(s) for Admission: CHF, ischemia, and accelerated hypertension       Hospital Notes below.     Acute diastolic CHF exacerbation  Troponin elevation, likely demand ischemia  Accelerated hypertension  He does not have a known history of CAD or CHF. He had a negative stress echo in 2013, and negative lower extremity KATHIE testing in 2015. He has been off several anti-hypertensives recently due to recent sepsis and he could have demand ischemia due to accelerated hypertension. He was ruled out for ACS with serial trops.  Lexiscan stress test results reviewed. No ischemia noted.  Fixed inferior defect likely artifact.  Cardiology discussed with patient option of cardiac MRI to rule out inferior scar.  This can be done as an outpatient when he is clinically much improved so that he is able to tolerate cardiac MRI, several sequences require breath-holding.  TTE with mildly reduced LV function, EF 45-50%, mild global hypokinesis of left ventricle.  Cardiology following. Transitioned to Lasix 40 mg p.o. twice daily, Losartan to 50 mg daily, carvedilol to 25 mg twice daily, and spironolactone 25 mg daily      Insulin dependent Diabetes: A1c 12.6 this month. It was 14 earlier in the year and he says he started insulin therapy 3/2019. He has peripheral neuropathy and at recent Podiatry visit it was noted he had bilateral foot drop, and he was referred to see a Neurologist as outpatient.  Continue Lantus 40 units at bedtime and ISS insulin.  Maintain close outpatient follow up with Neurology at discharge     Dyslipidemia: Not on medications due to his report that statins caused muscle pains.  He has significant dyslipidemia with highly  elevated LDL, and per cardiology, he should be on PCSK9 inhibitors as he has history of intolerance to statins.  This has to be worked through insurance and on follow up.  It looks like he may need to be on Zetia before he can qualify for PCSK9 inhibitor.     Recent back abscess: He was hospitalized 7/17/2019 through 7/22/2019 for sepsis due to cellulitis and back abscess. He also had YORDY and Lisinopril, hydrochlorothiazide were held. He was given Vancomycin, Zosyn and Daptomycin. Surgery was consulted and OR debridement done. Cultures reportedly positive for MSSA. He was transitioned to Ancef, then to oral Augmentin at discharge and finished 14 days antibiotic therapy. He says the wound is still open but healing slowly and denies recent purulent drainage there.  Wound nurse consulted.           Problems taking medications regularly:  None       Medication changes since discharge: Patient started on Carvedilol 25 mg, ezetimibe 10 mg, furosemide 40 mg, losartan 50 mg, and spironolactone 25 mg. Patient stopped metoprolol 50 mg.        Problems adhering to non-medication therapy:  None    Summary of hospitalization:  [facility] discharge summary reviewed  Diagnostic Tests/Treatments reviewed.  Follow up needed: none  Other Healthcare Providers Involved in Patient s Care:         None  Update since discharge: improved.     Post Discharge Medication Reconciliation: discharge medications reconciled, continue medications without change.  Plan of care communicated with patient     Coding guidelines for this visit:  Type of Medical   Decision Making Face-to-Face Visit       within 7 Days of discharge Face-to-Face Visit        within 14 days of discharge   Moderate Complexity 19541 19692   High Complexity 10045 02836            Patient Active Problem List   Diagnosis     Type 2 diabetes, HbA1c goal < 7% (H)     Hyperlipidemia with target LDL less than 100     Obesity     Back abscess     HTN (hypertension)     CHF  "exacerbation (H)     Past Surgical History:   Procedure Laterality Date     APPENDECTOMY       INCISION AND DRAINAGE, ABSCESS, SIMPLE N/A 7/19/2019    Procedure: INCISION AND DRAINAGE OF BACK ABSCESS;  Surgeon: Richard Lopez MD;  Location:  GI       Social History     Tobacco Use     Smoking status: Never Smoker     Smokeless tobacco: Never Used   Substance Use Topics     Alcohol use: No     Frequency: Never     Family History   Problem Relation Age of Onset     Hypertension Maternal Grandmother            Reviewed and updated as needed this visit by Provider         Review of Systems   ROS COMP: Constitutional, HEENT, cardiovascular, pulmonary, GI, , musculoskeletal, neuro, skin, endocrine and psych systems are negative, except as otherwise noted.      Objective    /86 (BP Location: Right arm, Patient Position: Chair, Cuff Size: Adult Regular)   Pulse 80   Temp 97.9  F (36.6  C) (Oral)   Ht 1.803 m (5' 11\")   Wt 89.7 kg (197 lb 12.8 oz)   BMI 27.59 kg/m    Body mass index is 27.59 kg/m .  Physical Exam   GENERAL: healthy, alert and no distress  HENT: ear canals and TM's normal, nose and mouth without ulcers or lesions  NECK: no adenopathy, no asymmetry, masses, or scars and thyroid normal to palpation  RESP: lungs clear to auscultation - no rales, rhonchi or wheezes  CV: regular rate and rhythm, normal S1 S2, no S3 or S4, no murmur, click or rub, no peripheral edema and peripheral pulses strong  ABDOMEN: soft, nontender, no hepatosplenomegaly, no masses and bowel sounds normal  MS: no gross musculoskeletal defects noted, no edema  SKIN: no suspicious lesions or rashes  NEURO: Normal strength and tone, mentation intact and speech normal    Diagnostic Test Results:  Labs reviewed in Epic        Assessment & Plan     (I50.33) Acute on chronic diastolic congestive heart failure (H)  (primary encounter diagnosis)  Comment:   Plan: Improving. Symptoms resolving. Notes less shortness of breath. " Weight is reducing. See Hospital notes. He has follow up with cardiology on Thursday. Follow up with me PRN.     He is requesting short term disability paperwork for 12 weeks as he recovers post surgically and from this CHF, demand ischemia issue.    Start date would be August 14th and would continue for 12 weeks from that date. I can do the FMLA and STD paperwork if he gets it to me.     (I10) Hypertension goal BP (blood pressure) < 140/90  Comment:   Plan: BP is improved. He is seeing cardiology on Thursday.     (I24.8) Demand ischemia (H)  Comment:   Plan: Asymptomatic now.     (E11.9) Type 2 diabetes, HbA1c goal < 7% (H)  Comment:   Plan:   Lab Results   Component Value Date    A1C 10.4 08/19/2019      He's still working on improving his numbers.      JANEE Morin, PA-C

## 2019-08-26 NOTE — PROGRESS NOTES
Clinic Care Coordination Contact    Clinic Care Coordination Contact  OUTREACH    Referral Information:  Referral Source: IP Handoff    Primary Diagnosis: CHF    Chief Complaint   Patient presents with     Clinic Care Coordination - Post Hospital     primary care RN CC        Universal Utilization: Patient uses the [employer] clinic system in the [facility].  Clinic Utilization  Difficulty keeping appointments:: No  Compliance Concerns: No  No-Show Concerns: No  No PCP office visit in Past Year: No  Utilization    Last refreshed: 8/26/2019  9:47 AM:  Hospital Admissions 2           Last refreshed: 8/26/2019  9:47 AM:  ED Visits 0           Last refreshed: 8/26/2019  9:47 AM:  No Show Count (past year) 2              Current as of: 8/26/2019  9:47 AM              Clinical Concerns:  Current Medical Concerns: The patient was recently hospitalized for congestive heart failure and an elevated troponin.  The patient will be newly enrolled with the core clinic and cardiology.  He also needs a follow-up with his PCP.  The follow-up appointments have been made and the patient states he will be attending them.  Patient is working on exercising at home with walking 10 minutes on the treadmill and later on riding the stationary bike for 10 minutes.  He has set a goal for doing this every day and the RN CC suggested will strive for 5/7 days begin with.  Patient denies any shortness of breath, cough, fever, or chest pain at this time.  He states right now the only time he has problems breathing is if he lies flat down the bed to sleep.  Patient Active Problem List   Diagnosis     Type 2 diabetes, HbA1c goal < 7% (H)     Hyperlipidemia with target LDL less than 100     Obesity     Back abscess     HTN (hypertension)     CHF exacerbation (H)     Current Behavioral Concerns: None currently noted.  Education Provided to patient: Options of care coordination.  Pain  Pain (GOAL):: No  Health Maintenance Reviewed: Due/Overdue    Health Maintenance Due   Topic Date Due     PREVENTIVE CARE VISIT  1960     HF ACTION PLAN  1960     ADVANCE CARE PLANNING  1960     EYE EXAM  1960     COLONOSCOPY  03/14/1971     ZOSTER IMMUNIZATION (1 of 2) 03/14/2011     PHQ-2  01/01/2019     Clinical Pathway: Clinic Care Coordination CHF Assessment    Discharge:    Hospital summary: diagnosis of CHF.  Day of hospital discharge: 8/19/19  What recommendations were made for follow up after your recent hospitalization?  Follow-up appointments to be made with PCP, cardiology, and core clinic.  Have the follow up appointments been scheduled? Yes  If not, can I help you set up these appointments? N/A  Transportation concerns (GOAL):: No    Medication Management:  Patient is knowledgeable on medications and is adherent.  No financial concerns reported at this time.  Patient is very comfortable with the medications and has no questions at this time.    Functional Status:  Dependent ADLs:: Ambulation-cane  Dependent IADLs:: Independent  Bed or wheelchair confined:: No  Mobility Status: Independent w/Device  Fallen 2 or more times in the past year?: No  Any fall with injury in the past year?: No    Living Situation:  Current living arrangement:: I live in a private home with spouse  [facility]    Diet/Exercise/Sleep:  Diet:: Diabetic diet  Inadequate nutrition (GOAL):: No  Food Insecurity: No  Tube Feeding: No  Exercise:: Yes  Days per week of moderate to strenuous exercise (like a brisk walk): 5  On average, minutes per day of exercise at this level: 20  How intense was your typical exercise? : Moderate (like brisk walking)  Exercise Minutes per Week: 100  Inadequate activity/exercise (GOAL):: No  Significant changes in sleep pattern (GOAL): No    Transportation:  Transportation concerns (GOAL):: No  Transportation means:: Regular car, Family     Psychosocial:  Orthodoxy or spiritual beliefs that impact treatment::  No  Mental health DX:: No  Mental health management concern (GOAL):: No  Informal Support system:: Spouse, Family, Friends     Financial/Insurance:   Financial/Insurance concerns (GOAL):: No     Resources and Interventions:  Current Resources:    ;   Community Resources: None  Supplies used at home:: Diabetic Supplies  Equipment Currently Used at Home: cane, straight    Advance Care Plan/Directive  Advanced Care Plans/Directives on file:: No  Advanced Care Plan/Directive Status: Considering Options    Referrals Placed: None     Goals:   Goals        Blood Pressure    Blood Pressure below 140/90     Related Problems    HTN (hypertension)       General    #1  Functional (pt-stated)     Notes - Note created  8/26/2019  9:43 AM by Winston Gu RN    Goal Statement: I will walk on the treadmill for 10 minutes and ride the stationary bike for 10 minutes at least 5/7 days per week to work on increasing strength and stamina.  Measure of Success: verbal response from the patient that he is exercising using the above parameters.  Supportive Steps to Achieve: Encouraged to work in small increments and few days to begin with and gradually increase.  Barriers: SOB due to CHF occasionally  Strengths: motivated to increase stamina.  Date to Achieve By: 8/26/2020  Patient expressed understanding of goal: yes                    Patient/Caregiver understanding: Patient has a excellent understanding of the disease process.  He is very willing to make goals.    Outreach Frequency: 2 weeks  Future Appointments              Today Marcial Larry PA-C [place], [place]    Today Richard Lopez MD [facility]    In 3 days DO LAB [facility] AT [facility] PSA CLIN    In 3 days Ludmila Soares PA-C [facility] PSA CLIN    In 1 month Marcial Larry PA-C [place]    In 1 month  Marcial Larry PA-C [place]          Plan: 1.  The patient will attend all of the follow-up appointments that have been made with all of the specialties as well as the PCP.  2.  Patient will take all medications as prescribed by the providers including all changes that have made as well as current and upcoming changes.  3.  Patient will begin to exercise with a goal of 5 out of 7 days doing 10 minutes of the treadmill and 10 minutes of stationary bike.  4.  Care Coordination to remain available for the patient to contact in the event of future needs.        Winston Haddad MSN, RN, PHN, [facility]   Primary Care Clinical RN Care Coordinator  [facility]   [email or web address]  Office: [phone]

## 2019-08-27 NOTE — TELEPHONE ENCOUNTER
Central Prior Authorization Team  Phone: [phone]    PA Initiation    Medication: insulin aspart (NOVOLOG PEN) 100 UNIT/ML pen  Insurance Company: [employer] ([facility]) - Phone [phone] Fax [phone]  Pharmacy Filling the Rx: [id number]  Filling Pharmacy Phone: [phone]  Filling Pharmacy Fax:    Start Date: 8/27/2019

## 2019-08-27 NOTE — TELEPHONE ENCOUNTER
Patient was evaluated by cardiology while inpatient for SOB, orthopnea, TRUJILLO BLE edema, leidy pleural effusions s/p recent back abscess and cellulitis due to MSSA.  IV Lasix diuresis this admission. Pt started on Coreg, Losartan, Lasix and Aldactone.  Called patient to discuss any post hospital d/c questions, review discharge medication, and confirm f/u appts. Patient denied any questions regarding new or changes to PTA medications. Patient denied any SOB, chest pain, edema, or light headedness. RN confirmed with patient that he has an apt scheduled with ENRIQUETA Ludmila Soares on 8/29/19 for Core Enrollment, with labs prior. Instructed patient to weigh self every AM, after waking and using the restroom, but before breakfast and medications. Call clinic for a weight gain of 2 lbs overnight or 5 lbs in a week. Low Na+ diet encouraged. Pt instructed to bring daily wt/BP diary and medications with to f/u OV. Patient advised to call clinic with any cardiac related questions or concerns prior to his appt. Patient verbalized understanding and agreed with plan. YINKA Alexis RN.

## 2019-08-27 NOTE — TELEPHONE ENCOUNTER
Prior Authorization Approval    Authorization Effective Date: 8/27/2019  Authorization Expiration Date: 8/27/2020  Medication: insulin aspart (NOVOLOG PEN) 100 UNIT/ML pen- APPROVED   Approved Dose/Quantity:   Reference #:     Insurance Company: [employer] ([facility]) - Phone [phone] Fax [phone]  Expected CoPay:       CoPay Card Available:      Foundation Assistance Needed:    Which Pharmacy is filling the prescription (Not needed for infusion/clinic administered): [facility] [address] [place] [address]  Pharmacy Notified: Yes  Patient Notified: Comment:  **Instructed pharmacy to notify patient when script is ready to /ship.**

## 2019-08-28 NOTE — TELEPHONE ENCOUNTER
Forms received from: [place] [employer]    Fax listed: n/a  Date received: August 28, 2019  Form description: FMLA  Once forms are completed, please return to pateint via .  Form placed: Khadar Larry PA-C sign me folder.      Sharlene MCCLELLAN Team [place]

## 2019-08-29 NOTE — NURSING NOTE
The After Visit Summary was reviewed with the patient following their office visit with Ludmila Soares PA-C. Patient was educated about any medication changes, the importance of following a low sodium diet, importance of recording daily weights, and when to call CORE clinic. Patient verbalized understanding of the information and agrees to call with any questions or concerns.     Labs: Lab results from today were reviewed with the patient during the office visit. A copy of the results were provided on the After Visit Summary.     Return appointment:   Future Appointments   Date Time Provider Department Center   9/9/2019  2:30 PM SCI[facility]   9/18/2019  8:50 AM Ludmila Soares PA-C [facility] PSA CLIN     Medication changes:  Reduce the Losartan (Cozaar) to 25 mg daily (cut in half) and take it instead with your evening dose of Coreg    Cherri Michel, RN  CORE Clinic RN Care Coordinator  [facility]  [phone]

## 2019-08-29 NOTE — TELEPHONE ENCOUNTER
Form signed and faxed to [employer] at [phone]. Copy made for chart.    Thank you,  Sharlene CRENSHAW    [place] Team [place]

## 2019-08-29 NOTE — LETTER
"8/29/2019    REINIER PINO PA-C  [address] [facility] [address]    RE: Jose Reid       Dear Colleague,    I had the pleasure of seeing Jose Redi in the [facility].    Cardiology Clinic Progress Note    Jose Reid MRN# [id number]   YOB: 1960 Age: 59 year old   Primary cardiologist: will be Dr. San         Assessment and Plan:     In summary, Jose Reid presents today for a hospital f/u visit and CORE clinic enrollment visit. He was recently admitted for five days with congestive heart failure in the setting of uncontrolled hypertension. He reports multiple medication intolerances. EF was mildly reduced at 45-50%. Lexiscan MPI showed an inferior defect which may represent artifact. He has no anginal symptoms, but multiple cardiac risk factors including age, hypertension and uncontrolled diabetes. He feels his dyspnea is \"finally improving\" on his current diuretic regimen. Weight is stable from discharge.     1. HFmrEF    ECHO: EF 45-50%    ACC/AHA Stage: C; FC II-III    Etiology: suspect hypertensive vs ischemic    RV: nml    Valves: OK    Volume: Appears euvolemic  - Admit Wt: 203 lbs  - Current Wt: 195 lbs  - Dry Wt: Suspect ~ 197 lbs  - Diuretics: Lasix 40 daily, Aldactone 25  - proBNP: 573    Rx: Losartan 50, Coreg 25 BID, Paul 25    Rhythm: SR    QRS: narrow    Device? none    Code status: full    Creatinine: 1.4 (baseline ~ 1.1)    Contributing  - Anemia / Fe studies: Not anemic  - TFT's: TSH slightly high, T4f WNL  - MEIR: unknown    Plan:  - Reduce Losartan to 25 mg daily due to rising creatinine, take with dinner to help with uncontrolled pressures by evening time. He will continue to monitor these at home.   - stress cMRI in one week to further assess inferior defect on inpatient MPI (creatinine to be re-checked day of study).  - repeat lipid panel in six weeks; if LDL uncontrolled will consider PCSK-9 inhibitor (needs to " fail zetia first, per insurance).  - cardiac rehab.  - Discussed daily weights, low sodium diet, and when to call.     It was a pleasure meeting Jose in the CORE clinic today. I will see him back in two weeks for reassessment with a BMP.        History of Presenting Illness:      Jose Reid is a pleasant 59 year old patient who presents today for a hospital f/u visit and CORE clinic enrollment visit.    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 He had recently been taken off several antihypertensive medications. TTE revealed a mildly reduced LVEF of 45%. Serial troponins were slightly elevated and a Kasia MPI showed a fixed inferior defect which was felt to be artifact. He was diuresed and ultimately discharged with Lasix 40 mg BID - dose had been reduced day of discharge due to a rising creatinine of 1.3. His PTA metoprolol was switched to Coreg; he was additionally placed on Losartan 50 and Aldactone 25. LDL in March was noted to be 190 and he reports intolerance to multiple statins. Zetia was started as his insurance requires him to fail this prior to PCSK-9 inhibitor initiation. Discharge wt was 197 lbs. He was seen by PCP on 8/26 and no changes were made.     Today, Jose presents with his wife Kassie. He states that yesterday was the first day he felt he was getting better. By this, he means that he is able to take a deep breath without coughing  "and slept all night without having dyspnea. His abdominal bloating is still a little present but this has also improved. He has history of a R ankle injury so that ankle is always a little puffy, he states. He has no new or concerning leg edema. He denies chest pain. He's been weighing himself consistently at home and it's fairly stable.   He tells me that for years he's been experiencing issues with reactions to blood pressure medications, mainly weakness and fainting. He tells me this isn't because his blood pressure was too low - he thinks he just reacted poorly. He's also been struggling with diabetes management - was initially treated as a type II and became very ill on oral medications. It looks like he did have some issues with medication compliance. Now improving on insulin. While his glucose was still markedly uncontrolled and he was being treated for his back abscess with vancomycin in July, his creatinine suffered and was as high as 2.5. Vanco, lisinopril and hydrochlorothiazide were stopped and  in August, it came down to the low 1's. Creatinine today is up a little from discharge, at 1.42. K+ is 4.3. BUN is 29. BP in clinic is well-controlled. He brings in his BP chart which shows fairly well controlled values in the mornings but these become elevated in the 140's/90's in the evenings. He tells me he needs to have a few teeth extracted - this was previously cancelled due to uncontrolled BP.   He works as a  in hospitals including [employer] but is seeking short-term disability.         Review of Systems:     12-pt ROS is negative except for as noted in the HPI.          Physical Exam:     Vitals: /72   Pulse 72   Ht 1.803 m (5' 11\")   Wt 88.8 kg (195 lb 12.8 oz)   SpO2 96%   BMI 27.31 kg/m     Wt Readings from Last 10 Encounters:   08/29/19 88.8 kg (195 lb 12.8 oz)   08/26/19 89.7 kg (197 lb 12.8 oz)   08/24/19 89.5 kg (197 lb 4.8 oz)   08/19/19 93 kg (205 lb)   08/19/19 92.1 kg (203 lb) "   08/05/19 91.4 kg (201 lb 9.6 oz)   07/22/19 92 kg (202 lb 12.8 oz)   05/02/19 91.4 kg (201 lb 9.6 oz)   03/01/19 86.1 kg (189 lb 12.8 oz)       Constitutional:  Patient is pleasant, alert, cooperative, and in NAD.  HEENT:  NCAT. PERRLA. EOM's intact.   Neck:  CVP appears normal. No carotid bruits.   Pulmonary: Normal respiratory effort. CTAB.   Cardiac: RRR, normal S1/S2, no S3/S4, grade 2/6 sm at the LSB.   Abdomen:  Non-tender abdomen, no hepatosplenomegaly appreciated.   Vascular: Pulses in the upper and lower extremities are 2+ and equal bilaterally.  Extremities: 1+ R ankle edema.   Skin:  No rashes or lesions appreciated.   Neurological:  No gross motor or sensory deficits.   Psych: Appropriate affect.        Data:     Labs reviewed:  Recent Labs   Lab Test 08/29/19  0825 08/23/19  0558 03/01/19  1050 07/03/12  1938   LDL  --   --  190* 82   HDL  --   --  41 25*   NHDL  --   --  244*  --    CHOL  --   --  285*  --    TRIG  --   --  272* 1583*   TSH  --  4.34* 2.12  --    NTBNP 573*  --   --   --        Lab Results   Component Value Date    WBC 9.3 08/23/2019    RBC 4.59 08/23/2019    HGB 14.0 08/29/2019    HCT 42.0 08/23/2019    MCV 92 08/23/2019    MCH 30.7 08/23/2019    MCHC 33.6 08/23/2019    RDW 14.9 08/23/2019     08/23/2019       Lab Results   Component Value Date     08/29/2019    POTASSIUM 4.3 08/29/2019    CHLORIDE 104 08/29/2019    CO2 30 (H) 08/29/2019    ANIONGAP 10.3 08/29/2019     (H) 08/29/2019    BUN 29 08/29/2019    CR 1.42 (H) 08/29/2019    GFRESTIMATED 51 (L) 08/29/2019    GFRESTBLACK 62 08/29/2019    SUE 9.3 08/29/2019      Lab Results   Component Value Date    AST 15 08/20/2019    ALT 19 08/20/2019       Lab Results   Component Value Date    A1C 10.4 (H) 08/19/2019       No results found for: INR        Problem List:     Patient Active Problem List   Diagnosis     Type 2 diabetes, HbA1c goal < 7% (H)     Hyperlipidemia with target LDL less than 100     Obesity      Back abscess     HTN (hypertension)     CHF exacerbation (H)           Medications:     Current Outpatient Medications   Medication Sig Dispense Refill     aspirin 81 MG EC tablet Take 81 mg by mouth At Bedtime  90 tablet 3     carvedilol (COREG) 25 MG tablet Take 1 tablet (25 mg) by mouth 2 times daily (with meals) 60 tablet 0     cyanocobalamin (CYANOCOBALAMIN) 1000 MCG/ML injection Inject 1,000 mcg into the muscle every 3 days        ezetimibe (ZETIA) 10 MG tablet Take 1 tablet (10 mg) by mouth At Bedtime 30 tablet 0     furosemide (LASIX) 40 MG tablet Take 1 tablet (40 mg) by mouth daily 30 tablet 0     insulin aspart (NOVOLOG PEN) 100 UNIT/ML pen Pre-Meal 140-189 =1 unit, 190-239 =2 units, 240-289 =3 units, 290-339 =4 units, = or >340 =5 units 27 mL 0     insulin glargine (LANTUS PEN) 100 UNIT/ML pen Inject 40 Units Subcutaneous At Bedtime 3 mL 1     losartan (COZAAR) 50 MG tablet Take 1 tablet (50 mg) by mouth daily 30 tablet 0     spironolactone (ALDACTONE) 25 MG tablet Take 1 tablet (25 mg) by mouth daily 30 tablet 0     vitamin B complex with vitamin C (STRESS TAB) tablet Take 1 tablet by mouth At Bedtime       Vitamin D, Cholecalciferol, 1000 units TABS Take 2 tablets by mouth At Bedtime strength unknown              Past Medical History:     Past Medical History:   Diagnosis Date     Abscess     MSSA back abscess     Diabetes (H)      Dyslipidemia      Erectile dysfunction      Hypertension      Peripheral neuropathy      Seasonal depression (H)      Past Surgical History:   Procedure Laterality Date     APPENDECTOMY       INCISION AND DRAINAGE, ABSCESS, SIMPLE N/A 7/19/2019    Procedure: INCISION AND DRAINAGE OF BACK ABSCESS;  Surgeon: Richard Lopez MD;  Location:  GI     Family History   Problem Relation Age of Onset     Hypertension Maternal Grandmother      Diabetes Maternal Grandmother      Family History Negative Mother      Unknown/Adopted Father      Diabetes Paternal Grandmother       Cerebrovascular Disease Paternal Grandmother      Family History Negative Brother      Family History Negative Sister      Family History Negative Son      Family History Negative Daughter      Social History     Socioeconomic History     Marital status:      Spouse name: Not on file     Number of children: Not on file     Years of education: Not on file     Highest education level: Not on file   Occupational History     Not on file   Social Needs     Financial resource strain: Not on file     Food insecurity:     Worry: Not on file     Inability: Not on file     Transportation needs:     Medical: Not on file     Non-medical: Not on file   Tobacco Use     Smoking status: Never Smoker     Smokeless tobacco: Never Used   Substance and Sexual Activity     Alcohol use: No     Frequency: Never     Drug use: No     Sexual activity: Not on file   Lifestyle     Physical activity:     Days per week: Not on file     Minutes per session: Not on file     Stress: Not on file   Relationships     Social connections:     Talks on phone: Not on file     Gets together: Not on file     Attends [employer] service: Not on file     Active member of club or organization: Not on file     Attends meetings of clubs or organizations: Not on file     Relationship status: Not on file     Intimate partner violence:     Fear of current or ex partner: Not on file     Emotionally abused: Not on file     Physically abused: Not on file     Forced sexual activity: Not on file   Other Topics Concern     Parent/sibling w/ CABG, MI or angioplasty before 65F 55M? Not Asked   Social History Narrative     Not on file           Allergies:   Exenatide; Hmg-coa-r inhibitors; Amlodipine; and Hctz [hydrochlorothiazide]      Ludmila Soares PA-C, QI  [facility]  Pager: [phone]      Thank you for allowing me to participate in the care of your patient.    Sincerely,     Ludmila Soares PA-C     [facility], [place]

## 2019-08-29 NOTE — PATIENT INSTRUCTIONS
Today, we discussed the following:   - Results: The kidneys are a little irritated. Creatinine is 1.4 (we want this closer to 1).  - Medication changes:  Reduce the Losartan (Cozaar) to 25 mg daily (cut in half) and take it instead with your evening dose of Coreg.    Continue to monitor the blood pressures at home.  - Follow up:    I have ordered an MRI of the heart to assess your abnormal stress test. You can have this done at your convenience.    Come back to see me in two weeks with a repeat blood test.     Please, remember to continue the followin.  Weigh yourself daily. Call if your weight is up > than 2 pounds in one day, or 5 pounds in one week; if you feel more short of breath or have worsening swelling in your legs or abdomen.  2.  Eat a low sodium diet (less than 2,000mg or 2g daily). If you eat less salt, you will retain less fluid.   3.  Avoid alcohol, as this can worsen heart failure.   4.  Avoid NSAIDs as able (For example, Ibuprofen / aleve / advil / naprosen / diclofenac).    Please call CORE nurse for any questions or concerns Mon-Fri 8am-4pm:   [phone]  For concerns after hours:   [phone]   Scheduling phone number:   [phone]    Thank you for visiting with us today.   Ludmila Soares PA-C  ______________________________________________________________

## 2019-08-29 NOTE — PROGRESS NOTES
Cardiology Clinic Progress Note    Jose Reid MRN# [id number]   YOB: 1960 Age: 59 year old   Primary cardiologist: will be Dr. San         Assessment and Plan:     In summary, Jose Reid presents today for a hospital f/u visit and CORE clinic enrollment visit. He was recently admitted for five days with congestive heart failure in the setting of uncontrolled hypertension. EF was mildly reduced at 45-50%. Lexiscan MPI showed an inferior defect which may represent artifact. He has no anginal symptoms, but multiple cardiac risk factors including age, hypertension and uncontrolled diabetes.     Today, ***.     1. HFmrEF    ECHO: EF 45-50%    ACC/AHA Stage: C; FC ***     Etiology: suspect hypertensive vs ischemic    RV: nml    Valves: OK    Volume: ***  - Admit Wt: 203 lbs  - Current Wt: 195 lbs  - Dry Wt: Suspect ~ 197 lbs  - Diuretics: Lasix 40 daily, Aldactone 25  - proBNP: ***    Rx: Losartan 50, Coreg 25 BID, Paul 25    Rhythm: SR    QRS: narrow    Device? none    Code status: full    Creatinine: 1.4    Contributing  - Anemia / Fe studies: Not anemic  - TFT's: TSH slightly high, T4f WNL  - MEIR: ***    Plan:  - Reduce Losartan to 25 mg daily, take with dinner.   - stress cMRI to further assess inferior defect on inpatient MPI.  - repeat lipid panel in six weeks; if LDL uncontrolled will consider PCSK-9 inhibitor (needs to fail zetia first, per insurance).  - cardiac rehab.  - Discussed daily weights, low sodium diet, and when to call.     It was a pleasure meeting Jose in the CORE clinic today. I will see him back in two weeks for reassessment with a BMP.        History of Presenting Illness:      Jose Reid is a pleasant 59 year old patient who presents today for a hospital f/u visit and CORE clinic enrollment visit.    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 He had recently been taken off several antihypertensive medications. TTE revealed a mildly reduced LVEF of 45%. Serial troponins were slightly elevated and a Kasia MPI showed a fixed inferior defect which was felt to be artifact. He was diuresed and ultimately discharged with Lasix 40 mg BID - dose had been reduced day of discharge due to a rising creatinine of 1.3. His PTA metoprolol was switched to Coreg; he was additionally placed on Losartan 50 and Aldactone 25. LDL in March was noted to be 190 and he reports intolerance to multiple statins. Zetia was started as his insurance requires him to fail this prior to PCSK-9 inhibitor initiation. Discharge wt was 197 lbs. He was seen by PCP on 8/26 and no changes were made.     Today, Jose presents with Kassie. He state that yesterday was the first day he felt he was getting better. By this, he means that he is able to take a deep breath without coughing and slept all night without having dyspnea. His abdominal bloating is still a little present but this has also improved. He denies chest pain. He's been weighing himself consistently at home and it's fairly stable.   He tells me that for years he's been experiencing issues with reactions to blood pressure medications, mainly weakness and fainting. He tells me this isn't because his blood pressure was too low. He's also been struggling with diabetes management - was initially treated as a type II and became very ill. Now improving on insulin. While his glucose was uncontrolled his creatinine suffered and was as high as 2.5. In August, it came down to the low 1's.   He needs to have a few teeth extracted upcoming.   He has history of a R ankle  "injury so that ankle is always a little puffy, he states.   He works as a  in hospitals including [employer] but is seeking short-term disability.  He brings in his BP chart which shows fairly well controlled values in the mornings but these become elevated in the 140's/90's in the evenings.   Creatinine today is up a little further, to 1.42. K+ is 4.3. BUN is 29. BP is well-controlled.          Review of Systems:     12-pt ROS is negative except for as noted in the HPI.          Physical Exam:     Vitals: /72   Pulse 72   Ht 1.803 m (5' 11\")   Wt 88.8 kg (195 lb 12.8 oz)   SpO2 96%   BMI 27.31 kg/m    Wt Readings from Last 10 Encounters:   08/29/19 88.8 kg (195 lb 12.8 oz)   08/26/19 89.7 kg (197 lb 12.8 oz)   08/24/19 89.5 kg (197 lb 4.8 oz)   08/19/19 93 kg (205 lb)   08/19/19 92.1 kg (203 lb)   08/05/19 91.4 kg (201 lb 9.6 oz)   07/22/19 92 kg (202 lb 12.8 oz)   05/02/19 91.4 kg (201 lb 9.6 oz)   03/01/19 86.1 kg (189 lb 12.8 oz)       Constitutional:  Patient is pleasant, alert, cooperative, and in NAD.  HEENT:  NCAT. PERRLA. EOM's intact.   Neck:  CVP appears normal. No carotid bruits.   Pulmonary: Normal respiratory effort. CTAB.   Cardiac: RRR, normal S1/S2, no S3/S4, grade 2/6 sm at the LSB.   Abdomen:  Non-tender abdomen, no hepatosplenomegaly appreciated.   Vascular: Pulses in the upper and lower extremities are 2+ and equal bilaterally.  Extremities: 1+ R ankle edema.   Skin:  No rashes or lesions appreciated.   Neurological:  No gross motor or sensory deficits.   Psych: Appropriate affect.        Data:     Labs reviewed:  Recent Labs   Lab Test 08/29/19  0825 08/23/19  0558 03/01/19  1050 07/03/12  1938   LDL  --   --  190* 82   HDL  --   --  41 25*   NHDL  --   --  244*  --    CHOL  --   --  285*  --    TRIG  --   --  272* 1583*   TSH  --  4.34* 2.12  --    NTBNP 573*  --   --   --        Lab Results   Component Value Date    WBC 9.3 08/23/2019    RBC 4.59 08/23/2019    HGB 14.0 08/29/2019 "    HCT 42.0 08/23/2019    MCV 92 08/23/2019    MCH 30.7 08/23/2019    MCHC 33.6 08/23/2019    RDW 14.9 08/23/2019     08/23/2019       Lab Results   Component Value Date     08/29/2019    POTASSIUM 4.3 08/29/2019    CHLORIDE 104 08/29/2019    CO2 30 (H) 08/29/2019    ANIONGAP 10.3 08/29/2019     (H) 08/29/2019    BUN 29 08/29/2019    CR 1.42 (H) 08/29/2019    GFRESTIMATED 51 (L) 08/29/2019    GFRESTBLACK 62 08/29/2019    SUE 9.3 08/29/2019      Lab Results   Component Value Date    AST 15 08/20/2019    ALT 19 08/20/2019       Lab Results   Component Value Date    A1C 10.4 (H) 08/19/2019       No results found for: INR        Problem List:     Patient Active Problem List   Diagnosis     Type 2 diabetes, HbA1c goal < 7% (H)     Hyperlipidemia with target LDL less than 100     Obesity     Back abscess     HTN (hypertension)     CHF exacerbation (H)           Medications:     Current Outpatient Medications   Medication Sig Dispense Refill     aspirin 81 MG EC tablet Take 81 mg by mouth At Bedtime  90 tablet 3     carvedilol (COREG) 25 MG tablet Take 1 tablet (25 mg) by mouth 2 times daily (with meals) 60 tablet 0     cyanocobalamin (CYANOCOBALAMIN) 1000 MCG/ML injection Inject 1,000 mcg into the muscle every 3 days        ezetimibe (ZETIA) 10 MG tablet Take 1 tablet (10 mg) by mouth At Bedtime 30 tablet 0     furosemide (LASIX) 40 MG tablet Take 1 tablet (40 mg) by mouth daily 30 tablet 0     insulin aspart (NOVOLOG PEN) 100 UNIT/ML pen Pre-Meal 140-189 =1 unit, 190-239 =2 units, 240-289 =3 units, 290-339 =4 units, = or >340 =5 units 27 mL 0     insulin glargine (LANTUS PEN) 100 UNIT/ML pen Inject 40 Units Subcutaneous At Bedtime 3 mL 1     losartan (COZAAR) 50 MG tablet Take 1 tablet (50 mg) by mouth daily 30 tablet 0     spironolactone (ALDACTONE) 25 MG tablet Take 1 tablet (25 mg) by mouth daily 30 tablet 0     vitamin B complex with vitamin C (STRESS TAB) tablet Take 1 tablet by mouth At Bedtime        Vitamin D, Cholecalciferol, 1000 units TABS Take 2 tablets by mouth At Bedtime strength unknown              Past Medical History:     Past Medical History:   Diagnosis Date     Abscess     MSSA back abscess     Diabetes (H)      Dyslipidemia      Erectile dysfunction      Hypertension      Peripheral neuropathy      Seasonal depression (H)      Past Surgical History:   Procedure Laterality Date     APPENDECTOMY       INCISION AND DRAINAGE, ABSCESS, SIMPLE N/A 7/19/2019    Procedure: INCISION AND DRAINAGE OF BACK ABSCESS;  Surgeon: Richard Lopez MD;  Location:  GI     Family History   Problem Relation Age of Onset     Hypertension Maternal Grandmother      Diabetes Maternal Grandmother      Family History Negative Mother      Unknown/Adopted Father      Diabetes Paternal Grandmother      Cerebrovascular Disease Paternal Grandmother      Family History Negative Brother      Family History Negative Sister      Family History Negative Son      Family History Negative Daughter      Social History     Socioeconomic History     Marital status:      Spouse name: Not on file     Number of children: Not on file     Years of education: Not on file     Highest education level: Not on file   Occupational History     Not on file   Social Needs     Financial resource strain: Not on file     Food insecurity:     Worry: Not on file     Inability: Not on file     Transportation needs:     Medical: Not on file     Non-medical: Not on file   Tobacco Use     Smoking status: Never Smoker     Smokeless tobacco: Never Used   Substance and Sexual Activity     Alcohol use: No     Frequency: Never     Drug use: No     Sexual activity: Not on file   Lifestyle     Physical activity:     Days per week: Not on file     Minutes per session: Not on file     Stress: Not on file   Relationships     Social connections:     Talks on phone: Not on file     Gets together: Not on file     Attends Spiritism service: Not on file      Active member of club or organization: Not on file     Attends meetings of clubs or organizations: Not on file     Relationship status: Not on file     Intimate partner violence:     Fear of current or ex partner: Not on file     Emotionally abused: Not on file     Physically abused: Not on file     Forced sexual activity: Not on file   Other Topics Concern     Parent/sibling w/ CABG, MI or angioplasty before 65F 55M? Not Asked   Social History Narrative     Not on file           Allergies:   Exenatide; Hmg-coa-r inhibitors; Amlodipine; and Hctz [hydrochlorothiazide]      Ludmila Soares PA-C, PA-C  [facility]  Pager: [phone]

## 2019-08-29 NOTE — PROGRESS NOTES
Patient returns in ongoing follow-up after incision and drainage/debridement of a back wound.  He has been doing well from the perspective of this.  He has had no fevers or chills.  Continues to manage his back wound appropriately.    On examination: This is essentially healed.  There is some residual skin discoloration but no cellulitis or signs of infection.    Overall doing well.  No further wound care necessary.  May follow-up on an as-needed basis.

## 2019-08-30 NOTE — TELEPHONE ENCOUNTER
Called patient and he will  form at .    Walked form to  and logged into book.    Janett Su       Please inform caregivers we need to be checking pt's BS in AM when he first wakes up before eating, then 2 hrs after each meal (like they are already doing). Does day support check BS after lunch because I was not provided with those readings. Also, what exactly is the sliding scale they are using for pt's Novolog. Please F/U with Sela Goldberg in 1-2 weeks with log of blood sugars for further management until pt can be seen by Endo since readings are very concerning.    Thanks,  N

## 2019-09-03 NOTE — PROGRESS NOTES
"Cardiology Clinic Progress Note    Jose Reid MRN# [id number]   YOB: 1960 Age: 59 year old   Primary cardiologist: will be Dr. San         Assessment and Plan:     In summary, Jose Reid presents today for a hospital f/u visit and CORE clinic enrollment visit. He was recently admitted for five days with congestive heart failure in the setting of uncontrolled hypertension. He reports multiple medication intolerances. EF was mildly reduced at 45-50%. Lexiscan MPI showed an inferior defect which may represent artifact. He has no anginal symptoms, but multiple cardiac risk factors including age, hypertension and uncontrolled diabetes. He feels his dyspnea is \"finally improving\" on his current diuretic regimen. Weight is stable from discharge.     1. HFmrEF    ECHO: EF 45-50%    ACC/AHA Stage: C; FC II-III    Etiology: suspect hypertensive vs ischemic    RV: nml    Valves: OK    Volume: Appears euvolemic  - Admit Wt: 203 lbs  - Current Wt: 195 lbs  - Dry Wt: Suspect ~ 197 lbs  - Diuretics: Lasix 40 daily, Aldactone 25  - proBNP: 573    Rx: Losartan 50, Coreg 25 BID, [place] 25    Rhythm: SR    QRS: narrow    Device? none    Code status: full    Creatinine: 1.4 (baseline ~ 1.1)    Contributing  - Anemia / Fe studies: Not anemic  - TFT's: TSH slightly high, T4f WNL  - MEIR: unknown    Plan:  - Reduce Losartan to 25 mg daily due to rising creatinine, take with dinner to help with uncontrolled pressures by evening time. He will continue to monitor these at home.   - stress cMRI in one week to further assess inferior defect on inpatient MPI (creatinine to be re-checked day of study).  - repeat lipid panel in six weeks; if LDL uncontrolled will consider PCSK-9 inhibitor (needs to fail zetia first, per insurance).  - cardiac rehab.  - Discussed daily weights, low sodium diet, and when to call.     It was a pleasure meeting Jose in the CORE clinic today. I will see him back in two weeks for reassessment " with a BMP.        History of Presenting Illness:      Jose Reid is a pleasant 59 year old patient who presents today for a hospital f/u visit and CORE clinic enrollment visit.    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 He had recently been taken off several antihypertensive medications. TTE revealed a mildly reduced LVEF of 45%. Serial troponins were slightly elevated and a Kasia MPI showed a fixed inferior defect which was felt to be artifact. He was diuresed and ultimately discharged with Lasix 40 mg BID - dose had been reduced day of discharge due to a rising creatinine of 1.3. His PTA metoprolol was switched to Coreg; he was additionally placed on Losartan 50 and Aldactone 25. LDL in March was noted to be 190 and he reports intolerance to multiple statins. Zetia was started as his insurance requires him to fail this prior to PCSK-9 inhibitor initiation. Discharge wt was 197 lbs. He was seen by PCP on 8/26 and no changes were made.     Today, Jose presents with his wife Kassie. He states that yesterday was the first day he felt he was getting better. By this, he means that he is able to take a deep breath without coughing and slept all night without having dyspnea. His abdominal bloating is still a little present but this has also improved. He has history of a R ankle injury so that ankle is always a little puffy, he states. He has no new or  "concerning leg edema. He denies chest pain. He's been weighing himself consistently at home and it's fairly stable.   He tells me that for years he's been experiencing issues with reactions to blood pressure medications, mainly weakness and fainting. He tells me this isn't because his blood pressure was too low - he thinks he just reacted poorly. He's also been struggling with diabetes management - was initially treated as a type II and became very ill on oral medications. It looks like he did have some issues with medication compliance. Now improving on insulin. While his glucose was still markedly uncontrolled and he was being treated for his back abscess with vancomycin in July, his creatinine suffered and was as high as 2.5. Vanco, lisinopril and hydrochlorothiazide were stopped and  in August, it came down to the low 1's. Creatinine today is up a little from discharge, at 1.42. K+ is 4.3. BUN is 29. BP in clinic is well-controlled. He brings in his BP chart which shows fairly well controlled values in the mornings but these become elevated in the 140's/90's in the evenings. He tells me he needs to have a few teeth extracted - this was previously cancelled due to uncontrolled BP.   He works as a  in hospitals including [employer] but is seeking short-term disability.         Review of Systems:     12-pt ROS is negative except for as noted in the HPI.          Physical Exam:     Vitals: /72   Pulse 72   Ht 1.803 m (5' 11\")   Wt 88.8 kg (195 lb 12.8 oz)   SpO2 96%   BMI 27.31 kg/m    Wt Readings from Last 10 Encounters:   08/29/19 88.8 kg (195 lb 12.8 oz)   08/26/19 89.7 kg (197 lb 12.8 oz)   08/24/19 89.5 kg (197 lb 4.8 oz)   08/19/19 93 kg (205 lb)   08/19/19 92.1 kg (203 lb)   08/05/19 91.4 kg (201 lb 9.6 oz)   07/22/19 92 kg (202 lb 12.8 oz)   05/02/19 91.4 kg (201 lb 9.6 oz)   03/01/19 86.1 kg (189 lb 12.8 oz)       Constitutional:  Patient is pleasant, alert, cooperative, and in NAD.  HEENT:  " NCAT. PERRLA. EOM's intact.   Neck:  CVP appears normal. No carotid bruits.   Pulmonary: Normal respiratory effort. CTAB.   Cardiac: RRR, normal S1/S2, no S3/S4, grade 2/6 sm at the LSB.   Abdomen:  Non-tender abdomen, no hepatosplenomegaly appreciated.   Vascular: Pulses in the upper and lower extremities are 2+ and equal bilaterally.  Extremities: 1+ R ankle edema.   Skin:  No rashes or lesions appreciated.   Neurological:  No gross motor or sensory deficits.   Psych: Appropriate affect.        Data:     Labs reviewed:  Recent Labs   Lab Test 08/29/19  0825 08/23/19  0558 03/01/19  1050 07/03/12  1938   LDL  --   --  190* 82   HDL  --   --  41 25*   NHDL  --   --  244*  --    CHOL  --   --  285*  --    TRIG  --   --  272* 1583*   TSH  --  4.34* 2.12  --    NTBNP 573*  --   --   --        Lab Results   Component Value Date    WBC 9.3 08/23/2019    RBC 4.59 08/23/2019    HGB 14.0 08/29/2019    HCT 42.0 08/23/2019    MCV 92 08/23/2019    MCH 30.7 08/23/2019    MCHC 33.6 08/23/2019    RDW 14.9 08/23/2019     08/23/2019       Lab Results   Component Value Date     08/29/2019    POTASSIUM 4.3 08/29/2019    CHLORIDE 104 08/29/2019    CO2 30 (H) 08/29/2019    ANIONGAP 10.3 08/29/2019     (H) 08/29/2019    BUN 29 08/29/2019    CR 1.42 (H) 08/29/2019    GFRESTIMATED 51 (L) 08/29/2019    GFRESTBLACK 62 08/29/2019    SUE 9.3 08/29/2019      Lab Results   Component Value Date    AST 15 08/20/2019    ALT 19 08/20/2019       Lab Results   Component Value Date    A1C 10.4 (H) 08/19/2019       No results found for: INR        Problem List:     Patient Active Problem List   Diagnosis     Type 2 diabetes, HbA1c goal < 7% (H)     Hyperlipidemia with target LDL less than 100     Obesity     Back abscess     HTN (hypertension)     CHF exacerbation (H)           Medications:     Current Outpatient Medications   Medication Sig Dispense Refill     aspirin 81 MG EC tablet Take 81 mg by mouth At Bedtime  90 tablet 3      carvedilol (COREG) 25 MG tablet Take 1 tablet (25 mg) by mouth 2 times daily (with meals) 60 tablet 0     cyanocobalamin (CYANOCOBALAMIN) 1000 MCG/ML injection Inject 1,000 mcg into the muscle every 3 days        ezetimibe (ZETIA) 10 MG tablet Take 1 tablet (10 mg) by mouth At Bedtime 30 tablet 0     furosemide (LASIX) 40 MG tablet Take 1 tablet (40 mg) by mouth daily 30 tablet 0     insulin aspart (NOVOLOG PEN) 100 UNIT/ML pen Pre-Meal 140-189 =1 unit, 190-239 =2 units, 240-289 =3 units, 290-339 =4 units, = or >340 =5 units 27 mL 0     insulin glargine (LANTUS PEN) 100 UNIT/ML pen Inject 40 Units Subcutaneous At Bedtime 3 mL 1     losartan (COZAAR) 50 MG tablet Take 1 tablet (50 mg) by mouth daily 30 tablet 0     spironolactone (ALDACTONE) 25 MG tablet Take 1 tablet (25 mg) by mouth daily 30 tablet 0     vitamin B complex with vitamin C (STRESS TAB) tablet Take 1 tablet by mouth At Bedtime       Vitamin D, Cholecalciferol, 1000 units TABS Take 2 tablets by mouth At Bedtime strength unknown              Past Medical History:     Past Medical History:   Diagnosis Date     Abscess     MSSA back abscess     Diabetes (H)      Dyslipidemia      Erectile dysfunction      Hypertension      Peripheral neuropathy      Seasonal depression (H)      Past Surgical History:   Procedure Laterality Date     APPENDECTOMY       INCISION AND DRAINAGE, ABSCESS, SIMPLE N/A 7/19/2019    Procedure: INCISION AND DRAINAGE OF BACK ABSCESS;  Surgeon: Richard Lopez MD;  Location:  GI     Family History   Problem Relation Age of Onset     Hypertension Maternal Grandmother      Diabetes Maternal Grandmother      Family History Negative Mother      Unknown/Adopted Father      Diabetes Paternal Grandmother      Cerebrovascular Disease Paternal Grandmother      Family History Negative Brother      Family History Negative Sister      Family History Negative Son      Family History Negative Daughter      Social History     Socioeconomic  History     Marital status:      Spouse name: Not on file     Number of children: Not on file     Years of education: Not on file     Highest education level: Not on file   Occupational History     Not on file   Social Needs     Financial resource strain: Not on file     Food insecurity:     Worry: Not on file     Inability: Not on file     Transportation needs:     Medical: Not on file     Non-medical: Not on file   Tobacco Use     Smoking status: Never Smoker     Smokeless tobacco: Never Used   Substance and Sexual Activity     Alcohol use: No     Frequency: Never     Drug use: No     Sexual activity: Not on file   Lifestyle     Physical activity:     Days per week: Not on file     Minutes per session: Not on file     Stress: Not on file   Relationships     Social connections:     Talks on phone: Not on file     Gets together: Not on file     Attends Orthodoxy service: Not on file     Active member of club or organization: Not on file     Attends meetings of clubs or organizations: Not on file     Relationship status: Not on file     Intimate partner violence:     Fear of current or ex partner: Not on file     Emotionally abused: Not on file     Physically abused: Not on file     Forced sexual activity: Not on file   Other Topics Concern     Parent/sibling w/ CABG, MI or angioplasty before 65F 55M? Not Asked   Social History Narrative     Not on file           Allergies:   Exenatide; Hmg-coa-r inhibitors; Amlodipine; and Hctz [hydrochlorothiazide]      Ludmila Soares PA-C, QI  [facility]  Pager: [phone]

## 2019-09-10 NOTE — PROGRESS NOTES
Maximo Moseley MD Gaustad, Kristine             Stress protocol    Contrast administered per weight based protocol.

## 2019-09-11 NOTE — PROGRESS NOTES
Notes recorded by Ludmila Soares PA-C on 9/11/2019 at 12:25 PM CDT  Nonspecific area of RV fibrosis noted. RVEF and LVEF are normal. No further work-up needed based on this. Please reassure patient. Can discuss further at upcoming visit. Discussed with Dr. San.      Called patient about results of cardiac MRI. Left voice mail regarding results of study. Waiting for call back.     Future Appointments   Date Time Provider Department Center   9/18/2019  7:50 AM DO LAB [facility]   9/18/2019  8:50 AM Ludmila Soares PA-C [facility]   10/3/2019  1:00 PM Marcial Larry PA-C [facility]   10/17/2019  9:20 AM Marcial Larry PA-C [facility]   11/22/2019  9:50 AM DO LAB [facility] CL[facility]   11/22/2019 10:45 AM Guzman San MD [facility]         ROGER Ramirez September 11, 2019 3:19 PM

## 2019-09-16 NOTE — LETTER
[place] CARE COORDINATION  [address] [facility] [address]    September 23, 2019    Jose Reid  [address]  [place] [address]      Dear Jose,    I have been attempting to reach you since our last contact. I would like to continue to work with you and provide any additional support you may need on achieving your health care related goals. I would appreciate if you would give me a call at [phone] to let me know if you would like to continue working together. I know that there are many things that can affect our ability to communicate and I hope we can continue to work together.    All of us at the [facility] are invested in your health and are here to assist you in meeting your goals.     Sincerely,    Winston Haddad MSN, RN, PHN, [facility]   Primary Care Clinical RN Care Coordinator  [facility]   [email or web address]  Office: [phone]

## 2019-09-16 NOTE — PROGRESS NOTES
Clinic Care Coordination Contact  [facility]/Voicemail       Clinical Data: Care Coordinator Outreach  Outreach attempted x 1.  Left message on patient's voicemail with call back information and requested return call.  Plan: Care Coordinator sent care coordination introduction letter on 8/26/19 via mail. Care Coordinator will try to reach patient again in 3-5 business days.          Winston Haddad MSN, RN, PHN, [facility]   Primary Care Clinical RN Care Coordinator  [facility]   [email or web address]  Office: [phone]

## 2019-09-17 NOTE — PROGRESS NOTES
Cardiology Clinic Progress Note    Jose Reid MRN# [id number]   YOB: 1960 Age: 59 year old   Primary cardiologist: will be Dr. San         Assessment and Plan:     In summary, Jose Reid presents today for a f/u CORE clinic visit.     1. Hx HFmrEF - non-ischemic, hypertensive.    - Reviewed that EF now improved to >60%, RVEF 56% per MRI 9/2019. Small area of fibrosis at RV insertion point inferiorly on MRI, nonspecific, likely responsible for defect on Lexiscan MPI   - remains on Losartan 25, Coreg 25 BID, [place] 25, Lasix 40 daily   - dry wt suspected ~ 197 lbs, which he's at. However he feels congested and has FC III symptoms (leg weakness and imbalance contributing, needs to see neuro for this).     2. Hypertension - mildly uncontrolled in clinic, fairly well-controlled at home.     3. Hyperlipidemia - on zetia, needs to fail this med prior to PCSK-9 inhibitor initiation.    4. CKD - improved following Losartan decrease. Today's creat pending, baseline ~ 1.1.    5. Multiple medication intolerances - tolerating current meds.     6. Hypoalbuminemia, deconditioning    Plan:  - Increase Lasix to 60 mg daily x 1 week to see if we can get some symptomatic benefit. RN will touch base in one week to see how he's feeling, weights, BP's, etc.   - Cardiac rehab highly recommended.   - Discussed daily weights, recommendations for low sodium/high protein diet, and when to call.   - Will notify him of lab results which are currently pending.     Follow up:  - Dr. San in November, lipid panel prior. I will be happy to see him sooner if any issues arise.         History of Presenting Illness:      Jose Reid is a pleasant 59 year old patient who presents today for a f/u CORE clinic visit.    The patient has a history of the following -   # HFmrEF, EF 45-50%  # HTN  # Hyperlipidemia, statin intolerance, now on zetia  # IDDM, uncontrolled with an a1c of 10.4  # YORDY / CKD, creatinine up to 2.5 in  7/2019 while admitted with back abscess and uncontrolled diabetes, on vancomycin, lisinopril/hctz (improved with med cessation)  # Peripheral neuropathy with bilateral foot drop, referred to neurology  # Back abscess, wound care following  # Social - lives at home with wife Kassie. He works as a  in hospitals including [employer] but is seeking short-term disability.    Jose was admitted from 8/19 - 8/24/19 with progressive leg swelling, orthopnea and dyspnea and found to be markedly hypertensive and in congestive heart failure. He had recently been taken off several antihypertensive medications. TTE revealed a mildly reduced LVEF of 45%. Serial troponins were slightly elevated and a Kasia MPI showed a fixed inferior defect which was felt to be artifact. He was diuresed and ultimately discharged with Lasix 40 mg BID - dose had been reduced day of discharge due to a rising creatinine of 1.3. His PTA metoprolol was switched to Coreg; he was additionally placed on Losartan 50 and Aldactone 25. LDL in March was noted to be 190 and he reports intolerance to multiple statins. Zetia was started as his insurance requires him to fail this prior to PCSK-9 inhibitor initiation. Discharge wt was 197 lbs.     When I met Jose for a CORE enrollment on 8/29, his symptoms of abdominal bloating, dyspnea and orthopnea had finally begun to improve. Weight was stable and BP was well-controlled. Creatinine had risen to 1.4. I reduced his Losartan and arranged a cardiac MRI. This was performed on 9/9 and showed an LVEF of 63% with no RWMA's. The RVEF was also normal. There was no evidence for ischemia or infiltrative disease. Segmental sequences for delayed enhancement were suboptimal due to breathing/motion artifact. There was a small area of non specific fibrosis noted at RV attachment point inferiorly. A trace pericardial effusion was noted. The creatinine looked better at 1.3.     Today, he presents for reassessment. Home weight is  "stable and BP is fairly well-controlled over the past one week. He and his wife are very concerned about ongoing deconditioning which he had even prior to admission, from progressive leg and balance issues. His PCP has referred him to neurology for this but he hasn't gone yet. He continues to sleep well without orthopnea however feels a little congested in his lungs and gets fairly winded with stair-climbing and walking, which he noticed while visiting his daughter in [place] this past weekend. He denies chest pain, but notes an occasional flutter. He is attempting a low sodium diet.          Review of Systems:     12-pt ROS is negative except for as noted in the HPI.          Physical Exam:     Vitals: /82   Pulse 76   Ht 1.803 m (5' 11\")   Wt 88.9 kg (196 lb)   SpO2 96%   BMI 27.34 kg/m    Wt Readings from Last 10 Encounters:   09/18/19 88.9 kg (196 lb)   08/29/19 88.8 kg (195 lb 12.8 oz)   08/26/19 89.7 kg (197 lb 12.8 oz)   08/24/19 89.5 kg (197 lb 4.8 oz)   08/19/19 93 kg (205 lb)   08/19/19 92.1 kg (203 lb)   08/05/19 91.4 kg (201 lb 9.6 oz)   07/22/19 92 kg (202 lb 12.8 oz)   05/02/19 91.4 kg (201 lb 9.6 oz)   03/01/19 86.1 kg (189 lb 12.8 oz)       Constitutional:  Patient is pleasant, alert, cooperative, and in NAD.  HEENT:  NCAT. PERRLA. EOM's intact.   Neck:  CVP appears normal. No carotid bruits.   Pulmonary: Normal respiratory effort. Reduced BS diffusely, soft crackles bases.   Cardiac: RRR, normal S1/S2, no S3/S4, grade 2/6 sm at the LSB.   Abdomen:  Non-tender abdomen, no hepatosplenomegaly appreciated.   Vascular: Pulses in the upper and lower extremities are 2+ and equal bilaterally.  Extremities: 1+ R ankle edema, chronic.   Skin:  No rashes or lesions appreciated.   Neurological:  No gross motor or sensory deficits.   Psych: Appropriate affect.        Data:     Labs reviewed:  Recent Labs   Lab Test 08/29/19  0825 08/23/19  0558 03/01/19  1050 07/03/12  1938   LDL  --   --  190* 82 "   HDL  --   --  41 25*   NHDL  --   --  244*  --    CHOL  --   --  285*  --    TRIG  --   --  272* 1583*   TSH  --  4.34* 2.12  --    NTBNP 573*  --   --   --        Lab Results   Component Value Date    WBC 9.3 08/23/2019    RBC 4.59 08/23/2019    HGB 14.0 08/29/2019    HCT 42.0 08/23/2019    MCV 92 08/23/2019    MCH 30.7 08/23/2019    MCHC 33.6 08/23/2019    RDW 14.9 08/23/2019     08/23/2019       Lab Results   Component Value Date     08/29/2019    POTASSIUM 4.3 08/29/2019    CHLORIDE 104 08/29/2019    CO2 30 (H) 08/29/2019    ANIONGAP 10.3 08/29/2019     (H) 08/29/2019    BUN 29 08/29/2019    CR 1.42 (H) 08/29/2019    GFRESTIMATED 57 (L) 09/09/2019    GFRESTBLACK 69 09/09/2019    SUE 9.3 08/29/2019      Lab Results   Component Value Date    AST 15 08/20/2019    ALT 19 08/20/2019       Lab Results   Component Value Date    A1C 10.4 (H) 08/19/2019       No results found for: INR        Problem List:     Patient Active Problem List   Diagnosis     Type 2 diabetes, HbA1c goal < 7% (H)     Hyperlipidemia with target LDL less than 100     Obesity     Back abscess     HTN (hypertension)     CHF exacerbation (H)           Medications:     Current Outpatient Medications   Medication Sig Dispense Refill     aspirin 81 MG EC tablet Take 81 mg by mouth At Bedtime  90 tablet 3     carvedilol (COREG) 25 MG tablet Take 1 tablet (25 mg) by mouth 2 times daily (with meals) 60 tablet 0     cyanocobalamin (CYANOCOBALAMIN) 1000 MCG/ML injection Inject 1,000 mcg into the muscle every 3 days        ezetimibe (ZETIA) 10 MG tablet Take 1 tablet (10 mg) by mouth At Bedtime 30 tablet 0     furosemide (LASIX) 40 MG tablet Take 1 tablet (40 mg) by mouth daily 30 tablet 0     insulin aspart (NOVOLOG PEN) 100 UNIT/ML pen Pre-Meal 140-189 =1 unit, 190-239 =2 units, 240-289 =3 units, 290-339 =4 units, = or >340 =5 units 27 mL 0     insulin glargine (LANTUS PEN) 100 UNIT/ML pen Inject 40 Units Subcutaneous At Bedtime 3 mL 1      losartan (COZAAR) 50 MG tablet Take 0.5 tablets (25 mg) by mouth daily (with dinner) 15 tablet 0     spironolactone (ALDACTONE) 25 MG tablet Take 1 tablet (25 mg) by mouth daily 30 tablet 0     vitamin B complex with vitamin C (STRESS TAB) tablet Take 1 tablet by mouth At Bedtime       Vitamin D, Cholecalciferol, 1000 units TABS Take 2 tablets by mouth At Bedtime strength unknown              Past Medical History:     Past Medical History:   Diagnosis Date     Abscess     MSSA back abscess     Diabetes (H)      Dyslipidemia      Erectile dysfunction      Hypertension      Peripheral neuropathy      Seasonal depression (H)      Past Surgical History:   Procedure Laterality Date     APPENDECTOMY       INCISION AND DRAINAGE, ABSCESS, SIMPLE N/A 7/19/2019    Procedure: INCISION AND DRAINAGE OF BACK ABSCESS;  Surgeon: Richard Lopez MD;  Location: [facility]     Family History   Problem Relation Age of Onset     Hypertension Maternal Grandmother      Diabetes Maternal Grandmother      Family History Negative Mother      Unknown/Adopted Father      Diabetes Paternal Grandmother      Cerebrovascular Disease Paternal Grandmother      Family History Negative Brother      Family History Negative Sister      Family History Negative Son      Family History Negative Daughter      Social History     Socioeconomic History     Marital status:      Spouse name: Not on file     Number of children: Not on file     Years of education: Not on file     Highest education level: Not on file   Occupational History     Not on file   Social Needs     Financial resource strain: Not on file     Food insecurity:     Worry: Not on file     Inability: Not on file     Transportation needs:     Medical: Not on file     Non-medical: Not on file   Tobacco Use     Smoking status: Never Smoker     Smokeless tobacco: Never Used   Substance and Sexual Activity     Alcohol use: No     Frequency: Never     Drug use: No     Sexual activity:  Not on file   Lifestyle     Physical activity:     Days per week: Not on file     Minutes per session: Not on file     Stress: Not on file   Relationships     Social connections:     Talks on phone: Not on file     Gets together: Not on file     Attends Islam service: Not on file     Active member of club or organization: Not on file     Attends meetings of clubs or organizations: Not on file     Relationship status: Not on file     Intimate partner violence:     Fear of current or ex partner: Not on file     Emotionally abused: Not on file     Physically abused: Not on file     Forced sexual activity: Not on file   Other Topics Concern     Parent/sibling w/ CABG, MI or angioplasty before 65F 55M? Not Asked   Social History Narrative     Not on file           Allergies:   Exenatide; Hmg-coa-r inhibitors; Amlodipine; and Hctz [hydrochlorothiazide]      Ludmila Soares PA-C, QI  [facility]  Pager: [phone]

## 2019-09-18 NOTE — LETTER
9/18/2019    REINIER PINO PA-C  [address] [address]    RE: Jose Reid       Dear Colleague,    I had the pleasure of seeing Jose Reid in the [facility].    Cardiology Clinic Progress Note    Jose Reid MRN# [id number]   YOB: 1960 Age: 59 year old   Primary cardiologist: will be Dr. San         Assessment and Plan:     In summary, Jose Reid presents today for a f/u CORE clinic visit.     1. Hx HFmrEF - non-ischemic, hypertensive.    - Reviewed that EF now improved to >60%, RVEF 56% per MRI 9/2019. Small area of fibrosis at RV insertion point inferiorly on MRI, nonspecific, likely responsible for defect on Lexiscan MPI   - remains on Losartan 25, Coreg 25 BID, [place] 25, Lasix 40 daily   - dry wt suspected ~ 197 lbs, which he's at. However he feels congested and has FC III symptoms (leg weakness and imbalance contributing, needs to see neuro for this).     2. Hypertension - mildly uncontrolled in clinic, fairly well-controlled at home.     3. Hyperlipidemia - on zetia, needs to fail this med prior to PCSK-9 inhibitor initiation.    4. CKD - improved following Losartan decrease. Today's creat pending, baseline ~ 1.1.    5. Multiple medication intolerances - tolerating current meds.     6. Hypoalbuminemia, deconditioning    Plan:  - Increase Lasix to 60 mg daily x 1 week to see if we can get some symptomatic benefit. RN will touch base in one week to see how he's feeling, weights, BP's, etc.   - Cardiac rehab highly recommended.   - Discussed daily weights, recommendations for low sodium/high protein diet, and when to call.   - Will notify him of lab results which are currently pending.     Follow up:  - Dr. San in November, lipid panel prior. I will be happy to see him sooner if any issues arise.         History of Presenting Illness:      Jose Reid is a pleasant 59 year old patient who presents today for a f/u CORE  clinic visit.    The patient has a history of the following -   # HFmrEF, EF 45-50%  # HTN  # Hyperlipidemia, statin intolerance, now on zetia  # IDDM, uncontrolled with an a1c of 10.4  # YORDY / CKD, creatinine up to 2.5 in 7/2019 while admitted with back abscess and uncontrolled diabetes, on vancomycin, lisinopril/hctz (improved with med cessation)  # Peripheral neuropathy with bilateral foot drop, referred to neurology  # Back abscess, wound care following  # Social - lives at home with wife Kassie. He works as a  in hospitals including [employer] but is seeking short-term disability.    Jose was admitted from 8/19 - 8/24/19 with progressive leg swelling, orthopnea and dyspnea and found to be markedly hypertensive and in congestive heart failure. He had recently been taken off several antihypertensive medications. TTE revealed a mildly reduced LVEF of 45%. Serial troponins were slightly elevated and a Kasia MPI showed a fixed inferior defect which was felt to be artifact. He was diuresed and ultimately discharged with Lasix 40 mg BID - dose had been reduced day of discharge due to a rising creatinine of 1.3. His PTA metoprolol was switched to Coreg; he was additionally placed on Losartan 50 and Aldactone 25. LDL in March was noted to be 190 and he reports intolerance to multiple statins. Zetia was started as his insurance requires him to fail this prior to PCSK-9 inhibitor initiation. Discharge wt was 197 lbs.     When I met Jsoe for a CORE enrollment on 8/29, his symptoms of abdominal bloating, dyspnea and orthopnea had finally begun to improve. Weight was stable and BP was well-controlled. Creatinine had risen to 1.4. I reduced his Losartan and arranged a cardiac MRI. This was performed on 9/9 and showed an LVEF of 63% with no RWMA's. The RVEF was also normal. There was no evidence for ischemia or infiltrative disease. Segmental sequences for delayed enhancement were suboptimal due to breathing/motion artifact.  "There was a small area of non specific fibrosis noted at RV attachment point inferiorly. A trace pericardial effusion was noted. The creatinine looked better at 1.3.     Today, he presents for reassessment. Home weight is stable and BP is fairly well-controlled over the past one week. He and his wife are very concerned about ongoing deconditioning which he had even prior to admission, from progressive leg and balance issues. His PCP has referred him to neurology for this but he hasn't gone yet. He continues to sleep well without orthopnea however feels a little congested in his lungs and gets fairly winded with stair-climbing and walking, which he noticed while visiting his daughter in [place] this past weekend. He denies chest pain, but notes an occasional flutter. He is attempting a low sodium diet.          Review of Systems:     12-pt ROS is negative except for as noted in the HPI.          Physical Exam:     Vitals: /82   Pulse 76   Ht 1.803 m (5' 11\")   Wt 88.9 kg (196 lb)   SpO2 96%   BMI 27.34 kg/m     Wt Readings from Last 10 Encounters:   09/18/19 88.9 kg (196 lb)   08/29/19 88.8 kg (195 lb 12.8 oz)   08/26/19 89.7 kg (197 lb 12.8 oz)   08/24/19 89.5 kg (197 lb 4.8 oz)   08/19/19 93 kg (205 lb)   08/19/19 92.1 kg (203 lb)   08/05/19 91.4 kg (201 lb 9.6 oz)   07/22/19 92 kg (202 lb 12.8 oz)   05/02/19 91.4 kg (201 lb 9.6 oz)   03/01/19 86.1 kg (189 lb 12.8 oz)       Constitutional:  Patient is pleasant, alert, cooperative, and in NAD.  HEENT:  NCAT. PERRLA. EOM's intact.   Neck:  CVP appears normal. No carotid bruits.   Pulmonary: Normal respiratory effort. Reduced BS diffusely, soft crackles bases.   Cardiac: RRR, normal S1/S2, no S3/S4, grade 2/6 sm at the LSB.   Abdomen:  Non-tender abdomen, no hepatosplenomegaly appreciated.   Vascular: Pulses in the upper and lower extremities are 2+ and equal bilaterally.  Extremities: 1+ R ankle edema, chronic.   Skin:  No rashes or lesions appreciated. "   Neurological:  No gross motor or sensory deficits.   Psych: Appropriate affect.        Data:     Labs reviewed:  Recent Labs   Lab Test 08/29/19  0825 08/23/19  0558 03/01/19  1050 07/03/12  1938   LDL  --   --  190* 82   HDL  --   --  41 25*   NHDL  --   --  244*  --    CHOL  --   --  285*  --    TRIG  --   --  272* 1583*   TSH  --  4.34* 2.12  --    NTBNP 573*  --   --   --        Lab Results   Component Value Date    WBC 9.3 08/23/2019    RBC 4.59 08/23/2019    HGB 14.0 08/29/2019    HCT 42.0 08/23/2019    MCV 92 08/23/2019    MCH 30.7 08/23/2019    MCHC 33.6 08/23/2019    RDW 14.9 08/23/2019     08/23/2019       Lab Results   Component Value Date     08/29/2019    POTASSIUM 4.3 08/29/2019    CHLORIDE 104 08/29/2019    CO2 30 (H) 08/29/2019    ANIONGAP 10.3 08/29/2019     (H) 08/29/2019    BUN 29 08/29/2019    CR 1.42 (H) 08/29/2019    GFRESTIMATED 57 (L) 09/09/2019    GFRESTBLACK 69 09/09/2019    SUE 9.3 08/29/2019      Lab Results   Component Value Date    AST 15 08/20/2019    ALT 19 08/20/2019       Lab Results   Component Value Date    A1C 10.4 (H) 08/19/2019       No results found for: INR        Problem List:     Patient Active Problem List   Diagnosis     Type 2 diabetes, HbA1c goal < 7% (H)     Hyperlipidemia with target LDL less than 100     Obesity     Back abscess     HTN (hypertension)     CHF exacerbation (H)           Medications:     Current Outpatient Medications   Medication Sig Dispense Refill     aspirin 81 MG EC tablet Take 81 mg by mouth At Bedtime  90 tablet 3     carvedilol (COREG) 25 MG tablet Take 1 tablet (25 mg) by mouth 2 times daily (with meals) 60 tablet 0     cyanocobalamin (CYANOCOBALAMIN) 1000 MCG/ML injection Inject 1,000 mcg into the muscle every 3 days        ezetimibe (ZETIA) 10 MG tablet Take 1 tablet (10 mg) by mouth At Bedtime 30 tablet 0     furosemide (LASIX) 40 MG tablet Take 1 tablet (40 mg) by mouth daily 30 tablet 0     insulin aspart (NOVOLOG  PEN) 100 UNIT/ML pen Pre-Meal 140-189 =1 unit, 190-239 =2 units, 240-289 =3 units, 290-339 =4 units, = or >340 =5 units 27 mL 0     insulin glargine (LANTUS PEN) 100 UNIT/ML pen Inject 40 Units Subcutaneous At Bedtime 3 mL 1     losartan (COZAAR) 50 MG tablet Take 0.5 tablets (25 mg) by mouth daily (with dinner) 15 tablet 0     spironolactone (ALDACTONE) 25 MG tablet Take 1 tablet (25 mg) by mouth daily 30 tablet 0     vitamin B complex with vitamin C (STRESS TAB) tablet Take 1 tablet by mouth At Bedtime       Vitamin D, Cholecalciferol, 1000 units TABS Take 2 tablets by mouth At Bedtime strength unknown              Past Medical History:     Past Medical History:   Diagnosis Date     Abscess     MSSA back abscess     Diabetes (H)      Dyslipidemia      Erectile dysfunction      Hypertension      Peripheral neuropathy      Seasonal depression (H)      Past Surgical History:   Procedure Laterality Date     APPENDECTOMY       INCISION AND DRAINAGE, ABSCESS, SIMPLE N/A 7/19/2019    Procedure: INCISION AND DRAINAGE OF BACK ABSCESS;  Surgeon: Richard Lopez MD;  Location:  GI     Family History   Problem Relation Age of Onset     Hypertension Maternal Grandmother      Diabetes Maternal Grandmother      Family History Negative Mother      Unknown/Adopted Father      Diabetes Paternal Grandmother      Cerebrovascular Disease Paternal Grandmother      Family History Negative Brother      Family History Negative Sister      Family History Negative Son      Family History Negative Daughter      Social History     Socioeconomic History     Marital status:      Spouse name: Not on file     Number of children: Not on file     Years of education: Not on file     Highest education level: Not on file   Occupational History     Not on file   Social Needs     Financial resource strain: Not on file     Food insecurity:     Worry: Not on file     Inability: Not on file     Transportation needs:     Medical: Not on  file     Non-medical: Not on file   Tobacco Use     Smoking status: Never Smoker     Smokeless tobacco: Never Used   Substance and Sexual Activity     Alcohol use: No     Frequency: Never     Drug use: No     Sexual activity: Not on file   Lifestyle     Physical activity:     Days per week: Not on file     Minutes per session: Not on file     Stress: Not on file   Relationships     Social connections:     Talks on phone: Not on file     Gets together: Not on file     Attends Bahai service: Not on file     Active member of club or organization: Not on file     Attends meetings of clubs or organizations: Not on file     Relationship status: Not on file     Intimate partner violence:     Fear of current or ex partner: Not on file     Emotionally abused: Not on file     Physically abused: Not on file     Forced sexual activity: Not on file   Other Topics Concern     Parent/sibling w/ CABG, MI or angioplasty before 65F 55M? Not Asked   Social History Narrative     Not on file           Allergies:   Exenatide; Hmg-coa-r inhibitors; Amlodipine; and Hctz [hydrochlorothiazide]      Ludmila Soares PA-C, QI  [facility]  Pager: [phone]      The After Visit Summary was reviewed with the patient following their office visit with Ludmila Soares. Patient was educated about any medication changes, the importance of following a low sodium diet, importance of recording daily weights, and when to call CORE clinic. Patient verbalized understanding of the information and agrees to call with any questions or concerns.     Labs: Lab results are pending, will contact patient with results.     Return appointment: Patient was given instructions on when and how to schedule their next office visit with the CORE clinic.     Medication changes: Increase Furosemide for 1 week.       Taylor Rodriguez, RN  CORE Clinic RN Care Coordinator  [facility]  [phone]      Thank you for allowing me to  participate in the care of your patient.    Sincerely,     Ludmila Soares PA-C     [facility]

## 2019-09-18 NOTE — PATIENT INSTRUCTIONS
Today, we discussed the following:   - Results: **  - Medication changes:  Increase the Lasix (furosemide) to 1.5 tablets (60 mg) daily x 1 week. My nurse will touch base with you after that to see how you're feeling.    Cardiac rehab - someone should call you to arrange this.    Low sodium, high protein diet (discuss the low protein more with your PCP).    See neurology at your convenience.    I will fill out the short-term disability paperwork for 12 weeks and have that for you by next week.   - Follow up: Dr. Sna in November. Will check your cholesterol prior.     Please, remember to continue the followin.  Weigh yourself daily. Call if your weight is up > than 2 pounds in one day, or 5 pounds in one week; if you feel more short of breath or have worsening swelling in your legs or abdomen.  2.  Eat a low sodium diet (less than 2,000mg or 2g daily). If you eat less salt, you will retain less fluid.   3.  Avoid alcohol, as this can worsen heart failure.   4.  Avoid NSAIDs as able (For example, Ibuprofen / aleve / advil / naprosen / diclofenac).    Please call CORE nurse for any questions or concerns Mon-Fri 8am-4pm:   [phone]  For concerns after hours:   [phone]   Scheduling phone number:   [phone]    Thank you for visiting with us today.   Ludmila Soares PA-C  ______________________________________________________________

## 2019-09-18 NOTE — PROGRESS NOTES
The After Visit Summary was reviewed with the patient following their office visit with Ludmila Soares. Patient was educated about any medication changes, the importance of following a low sodium diet, importance of recording daily weights, and when to call [facility]. Patient verbalized understanding of the information and agrees to call with any questions or concerns.     Labs: Lab results are pending, will contact patient with results.     Return appointment: Patient was given instructions on when and how to schedule their next office visit with the [facility].     Medication changes: Increase Furosemide for 1 week.       Taylor Rodriguez, RN  [facility] RN Care Coordinator  [facility]  [phone]

## 2019-09-23 NOTE — PROGRESS NOTES
Clinic Care Coordination Contact  [facility]UST/Voicemail       Clinical Data: Care Coordinator Outreach  Outreach attempted x 3.  Left message on patient's voicemail with call back information and requested return call.  Plan: Care Coordinator will send unable to contact letter with care coordinator contact information via [email or web address]. Care Coordinator will try to reach patient again in 1 month.          Winston Haddad MSN, RN, PHN, [facility]   Primary Care Clinical RN Care Coordinator  [facility]   [email or web address]  Office: [phone]

## 2019-09-23 NOTE — PROGRESS NOTES
Called and left voice mail regarding short term disability paperwork. Waiting for call back.     ROGER Ramirez September 23, 2019 8:53 AM

## 2019-09-23 NOTE — PROGRESS NOTES
INITIAL NEUROLOGY CONSULTATION    DATE OF VISIT: 9/24/2019  CLINIC LOCATION: [facility] [place][note 3]  MRN: [id number]  PATIENT NAME: Jose Reid  YOB: 1960    PRIMARY CARE PROVIDER: REINIER PINO PA-C     REASON FOR VISIT:   Chief Complaint   Patient presents with     Numbness     in feet and leg pain. Also poor balance. Started about a couple of years ago.      Referral          HISTORY OF PRESENT ILLNESS:                                                    Mr. Jose Reid is 59 year old right handed male patient with past medical history of hyperlipidemia, CHF, obesity, hypertension, and diabetes mellitus type 2, who was seen in consultation today requested by Dr. Kelley/Reinier Pino PA-C for peripheral polyneuropathy/bilateral foot drop.  The patient is accompanied by his wife, who participates in interview.    Per patient's report and review of medical records, he was hospitalized in July 2019 for sepsis related to cellulitis and back abscess, which was surgically drained.  Also found to have acute kidney injury and poorly controlled diabetes mellitus type 2 (hemoglobin A1C from greater than 15 in March 2019 to 10.4 in August 2019).  In August 2019 was readmitted again with progressive dyspnea and leg edema due to acute diastolic CHF exacerbation in the setting of uncontrolled hypertension.    The patient reports that he developed mild intermittent numbness, tingling, and pain in both feet approximately 5 years ago.  His symptoms continue to gradually progress over time.  Approximately 2 years ago he also noticed significant balance difficulty, especially in poorly lit environments or when he closes his eyes in the shower.  He admits to previous falls approximately 2 years ago.  No recent falls.  Using a cane.  He also has bilateral foot drop.  The left foot drop developed approximately 2 years ago, and the right foot drop is present for approximately last 6 months.  Going up or  down stairs is especially challenging.  Easily loses balance with turns.  Feels better after taking a bath and with rest.    The patient feels that he cannot work more than 100 feet due to dyspnea.  He noticed that distal lower extremity muscles are significantly reduced in size.  He also feels that intrinsic muscles of both hands are not as strong as they used to be because he has difficulty buttoning his shirt.  He needs to lie down to button his pants.  His swallowing and speech are not impaired.  No changes in bowel or bladder function.  Reports occasional dizziness and occasional blurry vision, but no other visual changes.  Denies any additional focal neurological symptoms.  Reports a history of concussion 2 years ago without long-term sequelae.  No prior history of seizures, strokes, or CNS infections.  No memory trouble.  Reports positive family history of MSA (father) and multiple sclerosis.    Most recent pertinent labs include elevated creatinine to 1.27 in September 2019 on BMP, elevated TSH (4.34), with normal free T4 (0.94) and unremarkable CBC in August 2019.  His HIV and hepatitis C serology was negative in March 2019.    No prior brain or spine imaging.  No additional useful information is available in Care Everywhere, which was reviewed.    Review of Systems - the patient endorses muscle tenderness, chest pain, feet edema, loss of libido, anxiety, depression, restless leg, heartburn, rash, and shortness of breath.  All of them have been previously discussed with other medical providers. Otherwise, he denies any other complaints on 14-point comprehensive review of systems.  PAST MEDICAL/SURGICAL HISTORY:                                                    I personally reviewed patient's past medical and surgical history with the patient at today's visit.  Patient Active Problem List   Diagnosis     Type 2 diabetes, HbA1c goal < 7% (H)     Hyperlipidemia with target LDL less than 100     Obesity      Back abscess     HTN (hypertension)     CHF exacerbation (H)     Past Medical History:   Diagnosis Date     Abscess     MSSA back abscess     Diabetes (H)      Dyslipidemia      Erectile dysfunction      Hypertension      Peripheral neuropathy      Seasonal depression (H)      Past Surgical History:   Procedure Laterality Date     APPENDECTOMY       INCISION AND DRAINAGE, ABSCESS, SIMPLE N/A 7/19/2019    Procedure: INCISION AND DRAINAGE OF BACK ABSCESS;  Surgeon: Richard Lopez MD;  Location:  GI     MEDICATIONS:                                                    I personally reviewed patient's medications and allergies with the patient at today's visit.    aspirin 81 MG EC tablet, Take 81 mg by mouth At Bedtime   carvedilol (COREG) 25 MG tablet, Take 1 tablet (25 mg) by mouth 2 times daily (with meals)  cyanocobalamin (CYANOCOBALAMIN) 1000 MCG/ML injection, Inject 1,000 mcg into the muscle every 3 days   ezetimibe (ZETIA) 10 MG tablet, Take 1 tablet (10 mg) by mouth At Bedtime  furosemide (LASIX) 40 MG tablet, Take 1 tablet (40 mg) by mouth daily  insulin aspart (NOVOLOG PEN) 100 UNIT/ML pen, Pre-Meal 140-189 =1 unit, 190-239 =2 units, 240-289 =3 units, 290-339 =4 units, = or >340 =5 units  insulin glargine (LANTUS PEN) 100 UNIT/ML pen, Inject 40 Units Subcutaneous At Bedtime  losartan (COZAAR) 50 MG tablet, Take 0.5 tablets (25 mg) by mouth daily (with dinner)  spironolactone (ALDACTONE) 25 MG tablet, Take 1 tablet (25 mg) by mouth daily  vitamin B complex with vitamin C (STRESS TAB) tablet, Take 1 tablet by mouth At Bedtime  Vitamin D, Cholecalciferol, 1000 units TABS, Take 2 tablets by mouth At Bedtime strength unknown     No current facility-administered medications on file prior to visit.     ALLERGIES:                                                      Allergies   Allergen Reactions     Exenatide Rash     Hmg-Coa-R Inhibitors Muscle Pain (Myalgia) and Other (See Comments)     Extreme Fatigue        Amlodipine      Other reaction(s): Dizziness  Dizzy       Hctz [Hydrochlorothiazide]      FAMILY/SOCIAL HISTORY:                                                    Family and social history was reviewed with the patient at today's visit.  Family history is positive for MSA, multiple sclerosis, and stroke.  Never smoker.  Denies current alcohol and recreational drug use.  , lives with his wife.  Works as a  part-time, but currently on short-term disability.  REVIEW OF SYSTEMS:                                                    Patient has completed a [facility] Patient Health History, including a 14-system review, which was personally reviewed, and pertinent positives are listed in HPI. He denies any additional problems on the further questioning.  EXAM:                                                    VITAL SIGNS:   /80 (BP Location: Right arm, Patient Position: Sitting, Cuff Size: Adult Regular)   Pulse 79   Temp 98.1  F (36.7  C) (Oral)   Wt 91.4 kg (201 lb 8 oz)   SpO2 98%   BMI 28.10 kg/m    Mini-Cog Assessment:  Mini Cog Assessment  Clock Draw Score: 2 Normal  3 Item Recall: 3 objects recalled  Mini Cog Total Score: 5    General: pt is in NAD, cooperative.  Skin: normal turgor, moist mucous membranes, no lesions/rashes noticed.  HEENT: ATNC, EOMI, PERRL, white sclera, normal conjunctiva, no nystagmus or ptosis. No carotid bruits bilaterally.  Respiratory: lung sounds clear to auscultation bilaterally, no crackles, wheezes, rhonchi. Symmetric lung excursion, no accessory respiratory muscle use.  Cardiovascular: normal S1/S2, no murmurs/rubs/gallops.   Abdomen: Not distended.  : deferred.    Neurological:  Mental: alert, follows commands, Mini Cog Total Score: 5/5 with 3/3 on memory recall, no aphasia or dysarthria. Fund of knowledge is appropriate for age.  Cranial Nerves:  CN II: visual acuity - able to accurately count fingers with each eye. Visual fields intact,  fundi: discs sharp, no papilledema and normal vessels bilaterally.  CN III, IV, VI: EOM intact, pupils equal and reactive  CN V: facial sensation nl  CN VII: face symmetric, no facial droop  CN VIII: hearing normal  CN IX: palate elevation symmetric, uvula at midline  CN XI SCM normal, shoulder shrug nl  CN XII: tongue midline  Motor: Strength: 5/5 in major groups of both upper and lower extremities, except 4+/5 in the intrinsic muscles of the right hand, 4/5 in intrinsic muscles of the left hand, 3/5 dorsiflexion bilaterally.  Reduced tone.  Fasciculations were observed in the left FDI and bilateral biceps muscles. No pronator drift b/l.  Reflexes: Triceps, biceps, and brachioradialis are normal bilaterally, patellar reflexes are significantly reduced bilaterally, and achilles reflexes are absent bilaterally. No clonus noted. Toes are down-going b/l.   Sensory: temperature, light touch, pinprick, and vibration intact. Romberg: Positive.  Coordination: FNF and heel-shin tests intact b/l.   Gait: Walks with bilateral steppage and a cane.  DATA:   LABS/IMAGING/OTHER STUDIES: I reviewed pertinent medical records, including Care Everywhere, as detailed in the history of present illness.  ASSESSMENT AND PLAN:      ASSESSMENT: Jose Reid is a 59 year old male patient with past medical history of hyperlipidemia, CHF, obesity, hypertension, and diabetes mellitus type 2, who presents with peripheral polyneuropathy, bilateral foot drops, bilateral asymmetric intrinsic hand muscle weakness, fasciculations, and balance difficulty.    We had a prolonged discussion with the patient and his wife regarding his presenting complaints.  His neurological exam today is notable for reduced strength in both hands and distal lower extremities with bilateral foot drops, fasciculations in at least 3 muscles, and findings of peripheral polyneuropathy.  The clinical presentation might be consistent with severe diabetic polyneuropathy, but  my concern is that he might have superimposed motor neuron disease or other condition (MMN, etc.).  Diabetic amyotrophy is also included in the differential.  For further diagnostic clarification I ordered EMG.  He would benefit from physical therapy meanwhile.  His podiatrist already ordered him bilateral foot orthotics to help with walking, and I encouraged the patient to get them as soon as possible.    DIAGNOSES:    ICD-10-CM    1. Diabetic polyneuropathy associated with type 2 diabetes mellitus (H) E11.42 EMG   2. Atrophy of muscle of multiple sites M62.59 EMG   3. Fasciculations of muscle R25.3 EMG   4. Difficulty balancing R29.818 PHYSICAL THERAPY REFERRAL   5. Bilateral foot-drop M21.371 PHYSICAL THERAPY REFERRAL    M21.372      PLAN: At today's visit we thoroughly discussed various diagnostic possibilities for patient's symptoms, necessary evaluation, and the plan, which includes:  Orders Placed This Encounter   Procedures     PHYSICAL THERAPY REFERRAL     EMG     He denies any significant degree of bilateral feet pain to require medications.  We discussed that he might consider a trial of alpha lipoic acid 600 mg daily.  No other medication changes.    Additional recommendations after the work-up.    Next follow-up appointment is in the next 4-6 weeks or earlier if needed.    Total Time:  82 minutes with > 50% spent counseling the patient and his wife on stated above assessment and recommendations, including nature of the diagnosis, needed w/u, available treatment options, and proposed plan.  Additional time was used to answer questions regarding patient's symptoms, my recommendations, and the plan.    Xu Lentz MD  / Neurology  [place]  (Chart documentation was completed in part with Dragon voice-recognition software. Even though reviewed, some grammatical, spelling, and word errors may remain.)

## 2019-09-24 NOTE — PATIENT INSTRUCTIONS
AFTER VISIT SUMMARY (AVS):    At today's visit we thoroughly discussed various diagnostic possibilities for your symptoms, necessary evaluation, and the plan, which includes:  Orders Placed This Encounter   Procedures     PHYSICAL THERAPY REFERRAL     EMG     You might consider a trial of alpha lipoic acid 600 mg daily.  No other medication changes..      Additional recommendations after the work-up.    Next follow-up appointment is in the next 4-6 weeks or earlier if needed.    Please do not hesitate to call me with any questions or concerns.    Thanks.

## 2019-09-24 NOTE — LETTER
9/24/2019         RE: Jose Reid  [address]  [facility] [id number]        Dear Colleague,    Thank you for referring your patient, Jose Reid, to the [facility]. Please see a copy of my visit note below.    INITIAL NEUROLOGY CONSULTATION    DATE OF VISIT: 9/24/2019  CLINIC LOCATION: [facility]  MRN: [id number]  PATIENT NAME: Jose Reid  YOB: 1960    PRIMARY CARE PROVIDER: REINIER LARRY PA-C     REASON FOR VISIT:   Chief Complaint   Patient presents with     Numbness     in feet and leg pain. Also poor balance. Started about a couple of years ago.      Referral          HISTORY OF PRESENT ILLNESS:                                                    Mr. Jose Reid is 59 year old right handed male patient with past medical history of hyperlipidemia, CHF, obesity, hypertension, and diabetes mellitus type 2, who was seen in consultation today requested by Dr. Kelley/Reinier Larry PA-C for peripheral polyneuropathy/bilateral foot drop.  The patient is accompanied by his wife, who participates in interview.    Per patient's report and review of medical records, he was hospitalized in July 2019 for sepsis related to cellulitis and back abscess, which was surgically drained.  Also found to have acute kidney injury and poorly controlled diabetes mellitus type 2 (hemoglobin A1C from greater than 15 in March 2019 to 10.4 in August 2019).  In August 2019 was readmitted again with progressive dyspnea and leg edema due to acute diastolic CHF exacerbation in the setting of uncontrolled hypertension.    The patient reports that he developed mild intermittent numbness, tingling, and pain in both feet approximately 5 years ago.  His symptoms continue to gradually progress over time.  Approximately 2 years ago he also noticed significant balance difficulty, especially in poorly lit environments or when he closes his eyes in the shower.  He admits  to previous falls approximately 2 years ago.  No recent falls.  Using a cane.  He also has bilateral foot drop.  The left foot drop developed approximately 2 years ago, and the right foot drop is present for approximately last 6 months.  Going up or down stairs is especially challenging.  Easily loses balance with turns.  Feels better after taking a bath and with rest.    The patient feels that he cannot work more than 100 feet due to dyspnea.  He noticed that distal lower extremity muscles are significantly reduced in size.  He also feels that intrinsic muscles of both hands are not as strong as they used to be because he has difficulty buttoning his shirt.  He needs to lie down to button his pants.  His swallowing and speech are not impaired.  No changes in bowel or bladder function.  Reports occasional dizziness and occasional blurry vision, but no other visual changes.  Denies any additional focal neurological symptoms.  Reports a history of concussion 2 years ago without long-term sequelae.  No prior history of seizures, strokes, or CNS infections.  No memory trouble.  Reports positive family history of MSA (father) and multiple sclerosis.    Most recent pertinent labs include elevated creatinine to 1.27 in September 2019 on BMP, elevated TSH (4.34), with normal free T4 (0.94) and unremarkable CBC in August 2019.  His HIV and hepatitis C serology was negative in March 2019.    No prior brain or spine imaging.  No additional useful information is available in Care Everywhere, which was reviewed.    Review of Systems - the patient endorses muscle tenderness, chest pain, feet edema, loss of libido, anxiety, depression, restless leg, heartburn, rash, and shortness of breath.  All of them have been previously discussed with other medical providers. Otherwise, he denies any other complaints on 14-point comprehensive review of systems.  PAST MEDICAL/SURGICAL HISTORY:                                                     I personally reviewed patient's past medical and surgical history with the patient at today's visit.  Patient Active Problem List   Diagnosis     Type 2 diabetes, HbA1c goal < 7% (H)     Hyperlipidemia with target LDL less than 100     Obesity     Back abscess     HTN (hypertension)     CHF exacerbation (H)     Past Medical History:   Diagnosis Date     Abscess     MSSA back abscess     Diabetes (H)      Dyslipidemia      Erectile dysfunction      Hypertension      Peripheral neuropathy      Seasonal depression (H)      Past Surgical History:   Procedure Laterality Date     APPENDECTOMY       INCISION AND DRAINAGE, ABSCESS, SIMPLE N/A 7/19/2019    Procedure: INCISION AND DRAINAGE OF BACK ABSCESS;  Surgeon: Richard Lopez MD;  Location:  GI     MEDICATIONS:                                                    I personally reviewed patient's medications and allergies with the patient at today's visit.    aspirin 81 MG EC tablet, Take 81 mg by mouth At Bedtime   carvedilol (COREG) 25 MG tablet, Take 1 tablet (25 mg) by mouth 2 times daily (with meals)  cyanocobalamin (CYANOCOBALAMIN) 1000 MCG/ML injection, Inject 1,000 mcg into the muscle every 3 days   ezetimibe (ZETIA) 10 MG tablet, Take 1 tablet (10 mg) by mouth At Bedtime  furosemide (LASIX) 40 MG tablet, Take 1 tablet (40 mg) by mouth daily  insulin aspart (NOVOLOG PEN) 100 UNIT/ML pen, Pre-Meal 140-189 =1 unit, 190-239 =2 units, 240-289 =3 units, 290-339 =4 units, = or >340 =5 units  insulin glargine (LANTUS PEN) 100 UNIT/ML pen, Inject 40 Units Subcutaneous At Bedtime  losartan (COZAAR) 50 MG tablet, Take 0.5 tablets (25 mg) by mouth daily (with dinner)  spironolactone (ALDACTONE) 25 MG tablet, Take 1 tablet (25 mg) by mouth daily  vitamin B complex with vitamin C (STRESS TAB) tablet, Take 1 tablet by mouth At Bedtime  Vitamin D, Cholecalciferol, 1000 units TABS, Take 2 tablets by mouth At Bedtime strength unknown     No current facility-administered  medications on file prior to visit.     ALLERGIES:                                                      Allergies   Allergen Reactions     Exenatide Rash     Hmg-Coa-R Inhibitors Muscle Pain (Myalgia) and Other (See Comments)     Extreme Fatigue       Amlodipine      Other reaction(s): Dizziness  Dizzy       Hctz [Hydrochlorothiazide]      FAMILY/SOCIAL HISTORY:                                                    Family and social history was reviewed with the patient at today's visit.  Family history is positive for MSA, multiple sclerosis, and stroke.  Never smoker.  Denies current alcohol and recreational drug use.  , lives with his wife.  Works as a  part-time, but currently on short-term disability.  REVIEW OF SYSTEMS:                                                    Patient has completed a [facility] Patient Health History, including a 14-system review, which was personally reviewed, and pertinent positives are listed in HPI. He denies any additional problems on the further questioning.  EXAM:                                                    VITAL SIGNS:   /80 (BP Location: Right arm, Patient Position: Sitting, Cuff Size: Adult Regular)   Pulse 79   Temp 98.1  F (36.7  C) (Oral)   Wt 91.4 kg (201 lb 8 oz)   SpO2 98%   BMI 28.10 kg/m     Mini-Cog Assessment:  Mini Cog Assessment  Clock Draw Score: 2 Normal  3 Item Recall: 3 objects recalled  Mini Cog Total Score: 5    General: pt is in NAD, cooperative.  Skin: normal turgor, moist mucous membranes, no lesions/rashes noticed.  HEENT: ATNC, EOMI, PERRL, white sclera, normal conjunctiva, no nystagmus or ptosis. No carotid bruits bilaterally.  Respiratory: lung sounds clear to auscultation bilaterally, no crackles, wheezes, rhonchi. Symmetric lung excursion, no accessory respiratory muscle use.  Cardiovascular: normal S1/S2, no murmurs/rubs/gallops.   Abdomen: Not distended.  : deferred.    Neurological:  Mental: alert,  follows commands, Mini Cog Total Score: 5/5 with 3/3 on memory recall, no aphasia or dysarthria. Fund of knowledge is appropriate for age.  Cranial Nerves:  CN II: visual acuity - able to accurately count fingers with each eye. Visual fields intact, fundi: discs sharp, no papilledema and normal vessels bilaterally.  CN III, IV, VI: EOM intact, pupils equal and reactive  CN V: facial sensation nl  CN VII: face symmetric, no facial droop  CN VIII: hearing normal  CN IX: palate elevation symmetric, uvula at midline  CN XI SCM normal, shoulder shrug nl  CN XII: tongue midline  Motor: Strength: 5/5 in major groups of both upper and lower extremities, except 4+/5 in the intrinsic muscles of the right hand, 4/5 in intrinsic muscles of the left hand, 3/5 dorsiflexion bilaterally.  Reduced tone.  Fasciculations were observed in the left FDI and bilateral biceps muscles. No pronator drift b/l.  Reflexes: Triceps, biceps, and brachioradialis are normal bilaterally, patellar reflexes are significantly reduced bilaterally, and achilles reflexes are absent bilaterally. No clonus noted. Toes are down-going b/l.   Sensory: temperature, light touch, pinprick, and vibration intact. Romberg: Positive.  Coordination: FNF and heel-shin tests intact b/l.   Gait: Walks with bilateral steppage and a cane.  DATA:   LABS/IMAGING/OTHER STUDIES: I reviewed pertinent medical records, including Care Everywhere, as detailed in the history of present illness.  ASSESSMENT AND PLAN:      ASSESSMENT: Jose Reid is a 59 year old male patient with past medical history of hyperlipidemia, CHF, obesity, hypertension, and diabetes mellitus type 2, who presents with peripheral polyneuropathy, bilateral foot drops, bilateral asymmetric intrinsic hand muscle weakness, fasciculations, and balance difficulty.    We had a prolonged discussion with the patient and his wife regarding his presenting complaints.  His neurological exam today is notable for  reduced strength in both hands and distal lower extremities with bilateral foot drops, fasciculations in at least 3 muscles, and findings of peripheral polyneuropathy.  The clinical presentation might be consistent with severe diabetic polyneuropathy, but my concern is that he might have superimposed motor neuron disease or other condition (MMN, etc.).  Diabetic amyotrophy is also included in the differential.  For further diagnostic clarification I ordered EMG.  He would benefit from physical therapy meanwhile.  His podiatrist already ordered him bilateral foot orthotics to help with walking, and I encouraged the patient to get them as soon as possible.    DIAGNOSES:    ICD-10-CM    1. Diabetic polyneuropathy associated with type 2 diabetes mellitus (H) E11.42 EMG   2. Atrophy of muscle of multiple sites M62.59 EMG   3. Fasciculations of muscle R25.3 EMG   4. Difficulty balancing R29.818 PHYSICAL THERAPY REFERRAL   5. Bilateral foot-drop M21.371 PHYSICAL THERAPY REFERRAL    M21.372      PLAN: At today's visit we thoroughly discussed various diagnostic possibilities for patient's symptoms, necessary evaluation, and the plan, which includes:  Orders Placed This Encounter   Procedures     PHYSICAL THERAPY REFERRAL     EMG     He denies any significant degree of bilateral feet pain to require medications.  We discussed that he might consider a trial of alpha lipoic acid 600 mg daily.  No other medication changes.    Additional recommendations after the work-up.    Next follow-up appointment is in the next 4-6 weeks or earlier if needed.    Total Time:  82 minutes with > 50% spent counseling the patient and his wife on stated above assessment and recommendations, including nature of the diagnosis, needed w/u, available treatment options, and proposed plan.  Additional time was used to answer questions regarding patient's symptoms, my recommendations, and the plan.    Xu Lentz MD  HP/HW  Neurology  Check  (Chart documentation was completed in part with Dragon voice-recognition software. Even though reviewed, some grammatical, spelling, and word errors may remain.)            Again, thank you for allowing me to participate in the care of your patient.        Sincerely,        Xu Lentz MD

## 2019-09-30 NOTE — PROGRESS NOTES
"Received VM from [facility] ([phone]) requesting ok to dispense spironolactone while pt on losartan d/t risk of potassium retention.     Reviewed Ludmila BAKER's last note, \"remains on Losartan 25, Coreg 25 BID, Paul 25, Lasix 40 daily\".     Called [facility] and reviewed ok to continue spironolactone and losartan, our office plans to monitor potassium levels routinely-see below.         Future Appointments   Date Time Provider Department Center   11/22/2019  9:50 AM DO LAB [facility]   11/22/2019 10:45 AM [facility]         Krystyna Sylvester RN BSN   10:32 AM 09/30/19        "

## 2019-10-03 NOTE — TELEPHONE ENCOUNTER
"Phone call to [facility] pharmacist. Previous Rx had \"max 15 units/day\" and this is needed in order for insurance to cover.   Per PCP notes, dose has not changed from previous so gave verbal to include this for script sent today as well.    Dangelo Alvarado RN    "

## 2019-10-03 NOTE — PROGRESS NOTES
Subjective     Jose Reid is a 59 year old male who presents to clinic today for the following health issues:    HPI   Patient is here to follow up on cardiac issues.  Has been to 3 sessions of cardiac rehab and would like to discuss. He feels good. Energy improving. Less swelling in abdomen and legs.    Patient Active Problem List   Diagnosis     Type 2 diabetes, HbA1c goal < 7% (H)     Hyperlipidemia with target LDL less than 100     Obesity     Back abscess     HTN (hypertension)     CHF exacerbation (H)      Current Outpatient Medications   Medication     aspirin 81 MG EC tablet     carvedilol (COREG) 25 MG tablet     cyanocobalamin (CYANOCOBALAMIN) 1000 MCG/ML injection     ezetimibe (ZETIA) 10 MG tablet     furosemide (LASIX) 40 MG tablet     insulin aspart (NOVOLOG PEN) 100 UNIT/ML pen     insulin glargine (LANTUS PEN) 100 UNIT/ML pen     losartan (COZAAR) 50 MG tablet     spironolactone (ALDACTONE) 25 MG tablet     vitamin B complex with vitamin C (STRESS TAB) tablet     Vitamin D, Cholecalciferol, 1000 units TABS     No current facility-administered medications for this visit.       Patient Active Problem List   Diagnosis     Type 2 diabetes, HbA1c goal < 7% (H)     Hyperlipidemia with target LDL less than 100     Obesity     Back abscess     HTN (hypertension)     CHF exacerbation (H)     Past Surgical History:   Procedure Laterality Date     APPENDECTOMY       INCISION AND DRAINAGE, ABSCESS, SIMPLE N/A 7/19/2019    Procedure: INCISION AND DRAINAGE OF BACK ABSCESS;  Surgeon: Richard Lopez MD;  Location:  GI       Social History     Tobacco Use     Smoking status: Never Smoker     Smokeless tobacco: Never Used   Substance Use Topics     Alcohol use: No     Frequency: Never     Family History   Problem Relation Age of Onset     Hypertension Maternal Grandmother      Diabetes Maternal Grandmother      Family History Negative Mother      Unknown/Adopted Father      Diabetes Paternal  "Grandmother      Cerebrovascular Disease Paternal Grandmother      Family History Negative Brother      Family History Negative Sister      Family History Negative Son      Family History Negative Daughter          Reviewed and updated as needed this visit by Provider         Review of Systems   ROS COMP: Constitutional, HEENT, cardiovascular, pulmonary, GI, , musculoskeletal, neuro, skin, endocrine and psych systems are negative, except as otherwise noted.      Objective    /78 (Cuff Size: Adult Regular)   Pulse 85   Temp 98.6  F (37  C) (Oral)   Ht 1.803 m (5' 11\")   Wt 91.2 kg (201 lb)   SpO2 98%   BMI 28.03 kg/m    Body mass index is 28.03 kg/m .  Physical Exam   GENERAL: healthy, alert and no distress  HENT: ear canals and TM's normal, nose and mouth without ulcers or lesions  NECK: no adenopathy, no asymmetry, masses, or scars and thyroid normal to palpation  RESP: lungs clear to auscultation - no rales, rhonchi or wheezes  CV: regular rate and rhythm, normal S1 S2, no S3 or S4, no murmur, click or rub, no peripheral edema and peripheral pulses strong  ABDOMEN: Mild ascites noted on exam otherwise negative     Diagnostic Test Results:  Labs reviewed in Epic        Assessment & Plan     (E11.59) Type 2 diabetes mellitus with other circulatory complication, unspecified whether long term insulin use (H)  (primary encounter diagnosis)  Comment:   Plan: insulin aspart (NOVOLOG PEN) 100 UNIT/ML pen          Lab Results   Component Value Date    A1C 10.4 08/19/2019     Recheck in 2 months. Continue meds. His glucose ranges are 120-180s    (I50.9) Congestive heart failure, unspecified HF chronicity, unspecified heart failure type (H)  Comment:   Plan: Asymptomatic. CORE patient. Doing well.    (N28.9) Decreased renal function  Comment:   Plan: Recheck labs when due. Stable and improving per review of BMPs.  Lab Results   Component Value Date    BUN 45 09/18/2019        Lab Results   Component Value Date " "   CR 1.27 09/18/2019           BMI:   Estimated body mass index is 28.03 kg/m  as calculated from the following:    Height as of this encounter: 1.803 m (5' 11\").    Weight as of this encounter: 91.2 kg (201 lb).       No follow-ups on file.    REINIER PINO PA-C  [facility]      "

## 2019-10-03 NOTE — TELEPHONE ENCOUNTER
Reason for Call:  prescription    Detailed comments: Needing clarification on insulin aspart (NOVOLOG PEN) 100 UNIT/ML pen    Phone Number Patient can be reached at: Other phone number:  [phone]    Best Time: Anytime    Can we leave a detailed message on this number? YES    Call taken on 10/3/2019 at 3:28 PM by Karen Dunham

## 2019-10-11 NOTE — PROGRESS NOTES
"   10/11/19 0700   Quick Adds   Type of Visit Initial OP PT Evaluation   General Information   Start of Care Date 10/11/19   Referring Physician Xu Lentz MD    Orders Evaluate and Treat as Indicated   Order Date 09/24/19   Medical Diagnosis Difficulty balancing, bilateral foot drop   Onset of illness/injury or Date of Surgery 09/24/19   Surgical/Medical history reviewed Yes   Pertinent history of current problem (include personal factors and/or comorbidities that impact the POC) Jose presents with worsening LE weakness, N/T, and bilateral food drop and subsequent balance concerns. he was hospitalized in July 2019 for sepsis related to cellulitis and back abscess, which was surgically drained.  Also found to have acute kidney injury and poorly controlled diabetes mellitus type 2 (hemoglobin A1C from greater than 15 in March 2019 to 10.4 in August 2019).  In August 2019 was readmitted again with progressive dyspnea and leg edema due to acute diastolic CHF exacerbation in the setting of uncontrolled hypertension. He is currently attending cardiac rehab. Due to progressive weakness and numbness, he is being worked up for progressive motor neuron disease. Reports that his balance has been off. Has a difficult time standing still. Is no longer walking as far as he needs to. Gets short of breath.    Prior level of function comment Used to walk 2 miles every evening, that has been shortening over the past 2 years.    Patient role/Employment history Disabled  (on Long term disability, pt is a )   [facility]   Home/Community Accessibility Comments with stairs, spouse. Adult children live in [facility]   Current Assistive Devices   (walking stick)   Patient/Family Goals Statement \"I don't really know\" is waiting on neurology. Would like to get back to walking   Fall Risk Screen   Fall screen completed by PT   Have you fallen 2 or more times in the past year? Yes   Have you fallen " and had an injury in the past year? No   Timed Up and Go score (seconds) 10.18   Fall screen comments Falls while snowblowing, otherwise able to catch himself.   Pain   Patient currently in pain No   Cognitive Status Examination   Orientation orientation to person, place and time   Range of Motion (ROM)   ROM Comment tightness through gastrocs, hips   Strength   Strength Comments B hip flexion and abduction 4+/5, knee extension 4/5, L dorisflexion 2-/5, R dorsiflexion 2/5   Bed Mobility   Bed Mobility Comments Independent   Gait   Gait Comments Ambulates with steppage gait pattern, higher on L, wide THADDEUS, hips ER   Gait Special Tests   Gait Special Tests 25 FOOT TIMED WALK   Gait Special Tests 25 Foot Timed Walk   Seconds 9.5   Steps 16 Steps   Balance Special Tests   Balance Special Tests Sit to stand reps;Modified CTSIB Conditions   Balance Special Tests Modified CTSIB Conditions   Condition 1, seconds 30 Seconds   Condition 2, seconds 4 Seconds   Condition 4, seconds 20 Seconds   Condition 5, seconds 0 Seconds   Balance Special Tests Sit to Stand Reps in 30 Seconds   Reps in 30 seconds 12   Comments requires UE support.   Sensory Examination   Sensory Perception Comments Complete numbness in both feet, reduced sensation to mid calf (per neurology appt)   Coordination   Coordination Comments impaired    Muscle Tone   Muscle Tone Comments 1 on the modified marques scale   Planned Therapy Interventions   Planned Therapy Interventions balance training;gait training;neuromuscular re-education;orthotic fitting/training;motor coordination training;strengthening;stretching;transfer training   Clinical Impression   Criteria for Skilled Therapeutic Interventions Met yes, treatment indicated   PT Diagnosis Impaired strenth, balance, and gait   Influenced by the following impairments weakness, N/T, limited ROM   Functional limitations due to impairments difficulty with balance, walking in household and community, increased  fall risk   Clinical Presentation Evolving/Changing   Clinical Presentation Rationale work up for motor neuron condition, fluctuating tone, pain   Clinical Decision Making (Complexity) Moderate complexity   Therapy Frequency 1 time/week   Predicted Duration of Therapy Intervention (days/wks) 60 days   Risk & Benefits of therapy have been explained Yes   Patient, Family & other staff in agreement with plan of care Yes   Clinical Impression Comments Jose is also seeing cardiac rehab, for this reason did not assess activity tolerance. Will focus primarily on functional strengthening, balance, coordination and gait training   Education Assessment   Preferred Learning Style Listening;Demonstration   Barriers to Learning No barriers   GOALS   PT Eval Goals 1;2;3   Goal 1   Goal Identifier 25' walk   Goal Description With LRAD, the pt will reduce time on 15' walk to under 7.5 seconds, improving ability to ambulate in the community   Target Date 12/10/19   Goal 2   Goal Identifier Sit to stand   Goal Description Due to improvements in funtional strenght the pt will be able to stand from standard chair with minimal assist fro UEs, improving ability to transfer from a variety of surfaces.   Target Date 12/10/19   Goal 3   Goal Identifier mCTSIB   Goal Description The pt will hold for 30 seconds on conditions 1 and 4 of mCTSIB, improving ability to maintain static balance in all environments   Target Date 12/10/19   Total Evaluation Time   PT Eval, Moderate Complexity Minutes (92806) 30

## 2019-10-22 NOTE — PROGRESS NOTES
[facility]  Electrodiagnostic Laboratory    Nerve Conduction & EMG Report          Patient:       Jose Reid  Patient ID:    [id number]  Gender:        Male  YOB: 1960  Age:           59 Years 7 Months        History & Examination:  59 year old man with poorly controlled diabetes reports progressive distal lower limb weakness and numbness over the last year. On examination he also has hand weakness and upper limb fasciculations. Eval for polyneuropathy vs motor neuronopathy.     Techniques: Motor and sensory conduction studies were done with surface recording electrodes. EMG was done with a concentric needle electrode.      Results:  Nerve conduction studies:   1. Bilateral sural and left ulnar sensory responses are absent.   2. Bilateral median-D2 and right ulnar-D5 sensory responses show moderately to severely reduced amplitudes and slowed CVs.   3. Left radial sensory response shows moderately reduced amplitude and slowed CV.   4. Bilateral peroneal-EDB, left tibial-AH and left peroneal-TA motor responses are absent.   5. Left ulnar-ADM motor response shows moderately prolonged DL, moderately to severely reduced amplitude, moderate CV slowing in the forearm and severe CV slowing across the elbow.   6. Right ulnar-ADM motor response shows moderately prolonged DL, moderately reduced amplitude, moderate CV slowing in the forearm and severe CV slowing across the elbow.   7. Right median-APB motor response shows moderately to severely  prolonged DL, moderately reduced amplitude, and moderate CV slowing in the forearm.  8. Left median-APB motor response shows moderately to severely  prolonged DL, normal amplitude, and moderate CV slowing in the forearm.    Needle EM. Fibrillation potentials and positive sharp waves were seen in the left FDI, TA, and gastrocnemius muscles.   2. Large amplitude and/or long duration motor unit potentials (MUP) were seen in the left triceps, PT, EIP,  "FDI, VL, TA, gastrocnemius, and glut med muscles.   3. Rapidly firing MUPs with reduced recruitment were seen in the left FDI, TA, and gastrocnemius muscles.     Interpretation:  This is an abnormal and very complex study. There is electrophysiologic evidence of (1) a sensorimotor polyneuropathy characterized by severe axon loss preferentially affecting the distal lower limb muscles. Superimposed upon the polyneuropathy are (2,3) bilateral median neuropathies at the wrist and (4,5) bilateral ulnar neuropathies at the elbows. Although the superimposed polyneuropathy limits the ability to assess focal neuropathy severity with certainty, both ulnar neuropathies and the right median neuropathy appear severe, while the left median neuropathy appears moderate. In addition, there are findings suggestive of (6) chronic multilevel cervical radiculopathies affecting the C7 and C8 nerve roots as well as (7) chronic lumbosacral radiculopathies affecting the L4 and perhaps L5 and S1 nerve roots. There is no evidence of a generalized disorder of lower motor neurons as clinically questioned. Clinical correlation is recommended. See additional comment.     Comment:   While the polyneuropathy is best classified as \"axonal\" note is made of conduction velocity slowing to moderate degrees. As the slowing does not reach a severity that would be considered \"demyelinating\" as defined by EFNS/PNS criteria, the most likely explanation of the slowing is a combination of amplitude-dependent loss as can be seen in severe axonal neuropathies and amplitude-independant slowing as can be seen in the context of poorly controlled diabetes.  Clinical correlation for features distinct to diabetic neuropathy and acquired immune mediated non-diabetic demyelinating polyneuropathies is strongly encouraged. Particular clinical consideration for painless diabetic motor neuropathy (PDMN) is recommended.       Jelani Farnsworth MD  Department of Neurology       "   Sensory NCS      Nerve / Sites Rec. Site Onset Peak NP Amp Ref. PP Amp Dist Nilson Ref. Temp     ms ms  V  V  V cm m/s m/s  C   L MEDIAN - Dig II Anti      Wrist Dig II 4.17 5.57 3.4 10.0 3.8 14 33.6 48.0 31.9   R MEDIAN - Dig II Anti      Wrist Dig II 4.64 5.78 2.8 10.0 2.5 14 30.2 48.0 32   L ULNAR - Dig V Anti      Wrist Dig V 3.54 4.22 1.7 8.0 1.9 12.5 35.3 48.0 32   R ULNAR - Dig V Anti      Wrist Dig V NR NR NR 8.0 NR 12.5 NR 48.0 32   L RADIAL - Snuff      Forearm Snuff 2.45 3.07 9.4 15.0 66.6 10 40.9 48.0 31.9   L SURAL - Lat Mall 60      Calf Ankle NR NR NR 5.0 NR 14 NR 38.0 29   R SURAL - Lat Mall 60      Calf Ankle NR NR NR 5.0 NR 14 NR 38.0 30.6       Motor NCS      Nerve / Sites Rec. Site Lat Ref. Amp Ref. Rel Amp Dist Nilson Ref. Dur. Area Temp.     ms ms mV mV % cm m/s m/s ms %  C   L MEDIAN - APB      Wrist APB 6.15 4.40 5.1 5.0 100 8   5.68 100 32.8      Elbow APB 12.71  4.8  93.4 25 38.1 48.0 5.78 74.5 32   R MEDIAN - APB      Wrist APB 6.56 4.40 3.2 5.0 100 8   4.95 100 32.3      Elbow APB 13.18  2.6  82.1 26 39.3 48.0 5.26 79.1 32.2   L ULNAR - ADM      Wrist ADM 4.58 3.50 1.2 5.0 100 8   5.68 100 32.2      B.Elbow ADM 10.21  0.6  47.7 21 37.3 48.0 6.88 49.3 32.3      A.Elbow ADM 13.02  0.7  54.6 8 28.4 48.0 7.03 50.5 32.3   R ULNAR - ADM      Wrist ADM 4.58 3.50 2.1 5.0 100 8   7.45 100 32.2      B.Elbow ADM 10.36  1.8  87.9 21 36.3 48.0 8.54 95.8 32.2      A.Elbow ADM 13.49  1.9  94.3 8 25.6 48.0 9.43 103 32.2   L DEEP PERONEAL - EDB 60      Ankle EDB NR 6.00 NR 2.0 NR 8   NR NR 29   R DEEP PERONEAL - EDB 60      Ankle EDB NR 6.00 NR 2.0 NR 8   NR NR 30.6   L TIBIAL - AH      Ankle AH NR 6.00 NR 4.0 NR 8   NR NR 29.3   L PERONEAL - Tib Ant      Fib Head Tib Ant NR  NR  NR    NR NR 30.3           EMG Summary Table     Spontaneous MUAP Recruitment    IA Fib/PSW Fasc H.F. Amp Dur. PPP Pattern   L. DELTOID N None None None N N N Normal   L. BICEPS N None None None N N N Normal   L. TRICEPS N None None  None N 1+ 2+ Normal   L. PRON TERES N None None None N 1+ 2+ Normal   L. FIRST D INTEROSS Increased 3+ None None 2+ 1+ 1+ Moderately Reduced   L. EXT INDICIS N None None None N 2+ 2+ Normal   L. VAST LATERALIS N None None None 1+ 2+ 2+ Normal   L. TIB ANTERIOR Increased 3+ None None N 2+ 2+ Discrete   L. GASTROCN (MED) Increased 3+ None None N 2+ 2+ Severely reduced   L. GLUTEUS MED N None None None N 2+ 2+ Normal   L. LUMB PSP (L) N None None None

## 2019-10-22 NOTE — LETTER
10/22/2019     RE: Jose Reid  [address]  [facility] [address]     Dear Colleague,    Thank you for referring your patient, Jose Reid, to the [facility] at [facility]. Please see a copy of my visit note below.          [facility]  Electrodiagnostic Laboratory    Nerve Conduction & EMG Report          Patient:       Jose Reid  Patient ID:    [id number]  Gender:        Male  YOB: 1960  Age:           59 Years 7 Months        History & Examination:  59 year old man with poorly controlled diabetes reports progressive distal lower limb weakness and numbness over the last year. On examination he also has hand weakness and upper limb fasciculations. Eval for polyneuropathy vs motor neuronopathy.     Techniques: Motor and sensory conduction studies were done with surface recording electrodes. EMG was done with a concentric needle electrode.      Results:  Nerve conduction studies:   1. Bilateral sural and left ulnar sensory responses are absent.   2. Bilateral median-D2 and right ulnar-D5 sensory responses show moderately to severely reduced amplitudes and slowed CVs.   3. Left radial sensory response shows moderately reduced amplitude and slowed CV.   4. Bilateral peroneal-EDB, left tibial-AH and left peroneal-TA motor responses are absent.   5. Left ulnar-ADM motor response shows moderately prolonged DL, moderately to severely reduced amplitude, moderate CV slowing in the forearm and severe CV slowing across the elbow.   6. Right ulnar-ADM motor response shows moderately prolonged DL, moderately reduced amplitude, moderate CV slowing in the forearm and severe CV slowing across the elbow.   7. Right median-APB motor response shows moderately to severely  prolonged DL, moderately reduced amplitude, and moderate CV slowing in the forearm.  8. Left median-APB motor response shows moderately to severely  prolonged DL,  "normal amplitude, and moderate CV slowing in the forearm.    Needle EM. Fibrillation potentials and positive sharp waves were seen in the left FDI, TA, and gastrocnemius muscles.   2. Large amplitude and/or long duration motor unit potentials (MUP) were seen in the left triceps, PT, EIP, FDI, VL, TA, gastrocnemius, and glut med muscles.   3. Rapidly firing MUPs with reduced recruitment were seen in the left FDI, TA, and gastrocnemius muscles.     Interpretation:  This is an abnormal and very complex study. There is electrophysiologic evidence of (1) a sensorimotor polyneuropathy characterized by severe axon loss preferentially affecting the distal lower limb muscles. Superimposed upon the polyneuropathy are (2,3) bilateral median neuropathies at the wrist and (4,5) bilateral ulnar neuropathies at the elbows. Although the superimposed polyneuropathy limits the ability to assess focal neuropathy severity with certainty, both ulnar neuropathies and the right median neuropathy appear severe, while the left median neuropathy appears moderate. In addition, there are findings suggestive of (6) chronic multilevel cervical radiculopathies affecting the C7 and C8 nerve roots as well as (7) chronic lumbosacral radiculopathies affecting the L4 and perhaps L5 and S1 nerve roots. There is no evidence of a generalized disorder of lower motor neurons as clinically questioned. Clinical correlation is recommended. See additional comment.     Comment:   While the polyneuropathy is best classified as \"axonal\" note is made of conduction velocity slowing to moderate degrees. As the slowing does not reach a severity that would be considered \"demyelinating\" as defined by EFNS/PNS criteria, the most likely explanation of the slowing is a combination of amplitude-dependent loss as can be seen in severe axonal neuropathies and amplitude-independant slowing as can be seen in the context of poorly controlled diabetes.  Clinical correlation " for features distinct to diabetic neuropathy and acquired immune mediated non-diabetic demyelinating polyneuropathies is strongly encouraged. Particular clinical consideration for painless diabetic motor neuropathy (PDMN) is recommended.       Jelani Farnsworth MD  Department of Neurology         Sensory NCS      Nerve / Sites Rec. Site Onset Peak NP Amp Ref. PP Amp Dist Nilson Ref. Temp     ms ms  V  V  V cm m/s m/s  C   L MEDIAN - Dig II Anti      Wrist Dig II 4.17 5.57 3.4 10.0 3.8 14 33.6 48.0 31.9   R MEDIAN - Dig II Anti      Wrist Dig II 4.64 5.78 2.8 10.0 2.5 14 30.2 48.0 32   L ULNAR - Dig V Anti      Wrist Dig V 3.54 4.22 1.7 8.0 1.9 12.5 35.3 48.0 32   R ULNAR - Dig V Anti      Wrist Dig V NR NR NR 8.0 NR 12.5 NR 48.0 32   L RADIAL - Snuff      Forearm Snuff 2.45 3.07 9.4 15.0 66.6 10 40.9 48.0 31.9   L SURAL - Lat Mall 60      Calf Ankle NR NR NR 5.0 NR 14 NR 38.0 29   R SURAL - Lat Mall 60      Calf Ankle NR NR NR 5.0 NR 14 NR 38.0 30.6       Motor NCS      Nerve / Sites Rec. Site Lat Ref. Amp Ref. Rel Amp Dist Nilson Ref. Dur. Area Temp.     ms ms mV mV % cm m/s m/s ms %  C   L MEDIAN - APB      Wrist APB 6.15 4.40 5.1 5.0 100 8   5.68 100 32.8      Elbow APB 12.71  4.8  93.4 25 38.1 48.0 5.78 74.5 32   R MEDIAN - APB      Wrist APB 6.56 4.40 3.2 5.0 100 8   4.95 100 32.3      Elbow APB 13.18  2.6  82.1 26 39.3 48.0 5.26 79.1 32.2   L ULNAR - ADM      Wrist ADM 4.58 3.50 1.2 5.0 100 8   5.68 100 32.2      B.Elbow ADM 10.21  0.6  47.7 21 37.3 48.0 6.88 49.3 32.3      A.Elbow ADM 13.02  0.7  54.6 8 28.4 48.0 7.03 50.5 32.3   R ULNAR - ADM      Wrist ADM 4.58 3.50 2.1 5.0 100 8   7.45 100 32.2      B.Elbow ADM 10.36  1.8  87.9 21 36.3 48.0 8.54 95.8 32.2      A.Elbow ADM 13.49  1.9  94.3 8 25.6 48.0 9.43 103 32.2   L DEEP PERONEAL - EDB 60      Ankle EDB NR 6.00 NR 2.0 NR 8   NR NR 29   R DEEP PERONEAL - EDB 60      Ankle EDB NR 6.00 NR 2.0 NR 8   NR NR 30.6   L TIBIAL - AH      Ankle AH NR 6.00 NR 4.0 NR 8   NR NR  29.3   L PERONEAL - Tib Ant      Fib Head Tib Ant NR  NR  NR    NR NR 30.3           EMG Summary Table     Spontaneous MUAP Recruitment    IA Fib/PSW Fasc H.F. Amp Dur. PPP Pattern   L. DELTOID N None None None N N N Normal   L. BICEPS N None None None N N N Normal   L. TRICEPS N None None None N 1+ 2+ Normal   L. PRON TERES N None None None N 1+ 2+ Normal   L. FIRST D INTEROSS Increased 3+ None None 2+ 1+ 1+ Moderately Reduced   L. EXT INDICIS N None None None N 2+ 2+ Normal   L. VAST LATERALIS N None None None 1+ 2+ 2+ Normal   L. TIB ANTERIOR Increased 3+ None None N 2+ 2+ Discrete   L. GASTROCN (MED) Increased 3+ None None N 2+ 2+ Severely reduced   L. GLUTEUS MED N None None None N 2+ 2+ Normal   L. LUMB PSP (L) N None None None                                                  Again, thank you for allowing me to participate in the care of your patient.      Sincerely,    Jelani Farnsworth MD

## 2019-10-25 PROBLEM — E11.42 DIABETIC POLYNEUROPATHY ASSOCIATED WITH TYPE 2 DIABETES MELLITUS (H): Status: ACTIVE | Noted: 2019-01-01

## 2019-10-25 NOTE — PROGRESS NOTES
Clinic Care Coordination Contact  [facility]/Voicemail       Clinical Data: Care Coordinator Outreach  Outreach attempted x 1.  Left message on patient's voicemail with call back information and requested return call.  Plan: Care Coordinator sent care coordination introduction letter on 8/26/19 via mail. Care Coordinator will try to reach patient again in 3-5 business days.        Winston Haddad MSN, RN, PHN, CCM   Primary Care Clinical RN Care Coordinator  [facility]  Employee of [facility]   [email or web address]  Office: [phone]

## 2019-10-25 NOTE — LETTER
[facility]ARE [facility]ORDINATION  [address] [facility] [address]    November 1, 2019    Jose Reid  [address]  [place] [facility] [address]      Dear Jose,    I have been unsuccessful in reaching you since our last contact. At this time I will make no further attempts to reach you, however this does not change your ability to continue receiving care from your providers at [facility]. If you are needing additional support from a care coordinator in the future please contact me at [phone].    All of us at [facility] are invested in your health and are here to assist you in meeting your goals.    Sincerely,    Winston Haddad MSN, RN, PHN, [facility]   Primary Care Clinical RN Care Coordinator  [facility]  Employee of [facility]   [email or web address]  Office: [phone]

## 2019-10-25 NOTE — PATIENT INSTRUCTIONS
AFTER VISIT SUMMARY (AVS):    At today's visit we thoroughly discussed current symptoms, EMG results (diabetic polyneuropathy, bilateral carpal tunnel, bilateral ulnar neuropathies, cervical and lumbar radiculopathies), available treatment options, and the plan.    Please wear bilateral wrist splints every night and during the day with repetitive wrist movements.  Please also wear bilateral elbow splints every night and bilateral elbow pads during the day.  I also placed a referral to orthopedic surgeon to discuss possible surgical treatments.    We discussed the importance of good blood sugar control to prevent further progression of your diabetic polyneuropathy.  Additional information was provided for home review.  We will perform lab work to check for additional causes of neuropathy.    Next follow-up appointment is in the next 6 months or earlier if needed.    Please do not hesitate to call me with any questions or concerns.    Thanks.    Patient Education     Treating Peripheral Neuropathy    Peripheral neuropathy is a disease of the nerves. It most often starts in your feet and may also eventually affect the arms. It may cause pain or may make you unable to sense pain. Sometimes, weakness occurs as well. Lack of pain and weakness makes you more likely to injure yourself without knowing it.  Learn ways to protect your feet. Check your feet daily for wounds you may not have felt. Avoid burns by testing bath water with your elbow before stepping in. Also, always wear shoes to prevent injury.  Regular foot care  If you have foot numbness, you may not notice cutting yourself while trimming your nails. To prevent problems, your healthcare provider may ask you to visit for nail and callus trimming. See your provider for foot care as often as suggested.  Check your feet daily  Catch problems early by checking your feet every day for changes. Look at the top and bottom of your feet, your heels, and between your toes.  It may help to use a mirror. If this is hard, ask someone to check for you. Call your healthcare provider if you notice a wound, ulceration, ingrown nail, or any changes in your feet. This includes increased heat, swelling, numbness, tingling, pain, and redness.  Wear proper footwear  Always wear shoes and socks, even indoors. Ask your healthcare provider how to choose the right shoe. After buying shoes, bring them to your doctor to be checked for proper fit. Take new shoes off every hour or so to check for red pressure areas on your feet. Each time you put on your shoes, use your fingers first to feel inside for foreign objects.  Common causes of peripheral neuropathy  Some common causes of peripheral neuropathy include:    Diabetes or other endocrine disorders    Toxins (such as alcohol)    Nutritional deficiencies (such as Vitamin B-12)    Kidney disease    Injury    Repetitive stress (such as carpal tunnel syndrome)    Autoimmune disease    Cancer and tumors    Chemotherapy     Arthritis    Advanced age    Heredity    Infection  Diagnosis and treatment  Diagnosis of peripheral neuropathy includes a complete history and physical exam.  Lab tests including blood work and imaging often help determine the cause. Special nerve tests are often helpful including nerve conduction velocity studies (NCV), and electromyography (EMG).  Treatment focuses on treating the underlying disorder and treating symptoms through the use of medicines, injections, TENS (transcutaneous electrical nerve stimulation), acupuncture, massage, and other methods.  Date Last Reviewed: 1/1/2018 2000-2018 [employer]. [address] [place] [address], [place] [address]. All rights reserved. This information is not intended as a substitute for professional medical care. Always follow your healthcare professional's instructions.

## 2019-10-25 NOTE — PROGRESS NOTES
ESTABLISHED PATIENT NEUROLOGY NOTE    DATE OF VISIT: 10/29/2019  CLINIC LOCATION: [facility]  MRN: [id number]  PATIENT NAME: Jose Reid  YOB: 1960    PCP: REINIER PINO PA-C    REASON FOR VISIT:   Chief Complaint   Patient presents with     Follow Up     EMG     SUBJECTIVE:                                                      HISTORY OF PRESENT ILLNESS: Patient is here for a follow up regarding peripheral polyneuropathy, bilateral foot drops, bilateral asymmetric intrinsic hand muscle weakness, fasciculations, and balance difficulty. Please refer to my initial note from 09/24/2019 for further information.  The patient is accompanied by his wife, who participates in interview.    Since the last visit, the patient reports no significant changes in his symptoms.  He reports occasional bilateral hand twitching after he had an EMG.  Denies interval development of new neurological symptoms.    EMG from 10/22/2019 was abnormal and very complex.  Severe sensorimotor polyneuropathy, predominantly affecting distal lower limbs, bilateral median neuropathies at the wrist, bilateral ulnar neuropathies at the elbow, chronic multilevel cervical C7-8 radiculopathies and chronic lumbosacral radiculopathies affecting L4 and possibly L5-S1 nerve roots were noted.  It felt that even though superimposed polyneuropathy limited the interpretation, both ulnar neuropathies and right median neuropathy appeared to be severe, while the left medial neuropathy appeared moderate in severity.  No evidence of generalized disorder of lower motor neurons was seen.  It was commented that conduction slowing seen during the study possibly explained by an amplitude dependent loss that could be seen with severe axonal neuropathies.  A consideration for painless diabetic motor neuropathy was advised on clinical grounds.    On review of systems, patient endorses no other active complaints. Medications, allergies, family  and social history were also reviewed. There are no changes reported by patient.  REVIEW OF SYSTEMS:                                                    10-system review was completed. Pertinent positives are included in HPI. The remainder of ROS is negative.  EXAM:                                                    Physical Exam:   Vitals: /78 (BP Location: Left arm, Patient Position: Sitting, Cuff Size: Adult Regular)   Pulse 87   Temp 98.3  F (36.8  C) (Oral)   Wt 92.1 kg (203 lb)   SpO2 98%   BMI 28.31 kg/m      General: pt is in NAD, cooperative.  Skin: normal turgor, moist mucous membranes, no lesions/rashes noticed.  HEENT: ATNC, white sclera, normal conjunctiva.  Respiratory: Symmetric lung excursion, no accessory respiratory muscle use.  Abdomen: Non distended.  Neurological: awake, cooperative, follows commands, no aphasia or dysarthria noted, cranial nerves II-XII: no ptosis, extraocular motility is full, face is symmetric, tongue is midline, strength is reduced to 4-4.5/5 in intrinsic muscles of both hands and 3/5 dorsiflexion bilaterally, left FDI fasciculations, walks with bilateral steppage.  Uses a cane.  DATA:   EMG: I personally reviewed tabulated data from EMG report, as detailed in the history of present illness.  ASSESSMENT AND PLAN:                                                    Assessment: 59-year-old male patient with bilateral peripheral polyneuropathy/bilateral foot drops, bilateral asymmetric intrinsic hand muscle weakness, occasional fasciculations, and balance difficulty presents for a follow-up after the completion of recommended EMG.    We had a detailed discussion with the patient and his wife regarding his current symptoms, EMG results, available treatment options, and the plan.  At the initial visit the concern was raised for a possibility of motor neuron disease or other conditions such as multifocal motor neuropathy or diabetic amyotrophy.  However, the results of  EMG were negative for alternative diagnosis.  It appears that he has severe sensorimotor polyneuropathy versus painless diabetic motor polyneuropathy superimposed on bilateral severe ulnar neuropathies at the elbow, severe right and moderate left median neuropathies at the wrists, and chronic (not currently active) multilevel cervical and lumbar radiculopathies.  Polyneuropathy is most likely caused by diabetes, though I added labs to search for additional treatable causes.    We discussed available treatments for each condition that include vigilant control of diabetes to prevent further progression of diabetic polyneuropathy along with regular feet care.  We also talked about treatments of carpal tunnel syndrome and ulnar neuropathies that include splinting, steroid injections, and surgery (which is advised with severe neuropathy).  The patient was issued prescriptions for bilateral wrist and elbow splints, elbow pads, and was referred to discuss surgical treatments.  Occupational therapy referral for hand therapy was also placed.    For cervical and lumbar radiculopathies along with other conditions, he was advised to continue physical therapy.    Diagnoses:    ICD-10-CM    1. Diabetic polyneuropathy associated with type 2 diabetes mellitus (H) E11.42    2. Bilateral carpal tunnel syndrome G56.03    3. Ulnar neuropathy of both upper extremities G56.23    4. Cervical radiculopathy M54.12    5. Lumbar radiculopathy M54.16    6. Fasciculations of muscle R25.3    7. Atrophy of muscle of multiple sites M62.59      Plan: At today's visit we thoroughly discussed current symptoms, EMG results (diabetic polyneuropathy, bilateral carpal tunnel, bilateral ulnar neuropathies, cervical and lumbar radiculopathies), available treatment options, and the plan.    I counseled the patient to wear bilateral wrist splints every night and during the day with repetitive wrist movements.  He was also advised to wear bilateral elbow  splints every night and bilateral elbow pads during the day.  I placed a referral to orthopedic surgeon to discuss possible surgical treatments.    We discussed the importance of good blood sugar control to prevent further progression of his diabetic polyneuropathy.  Additional information was provided for home review.  We will perform lab work to check for additional causes of neuropathy.    Next follow-up appointment is in the next 6 months or earlier if needed.    Total Time: 45 minutes with > 50% spent counseling the patient and his wife on stated above assessment and recommendations.    Xu Lentz MD  HP/HW Neurology

## 2019-10-29 NOTE — LETTER
10/29/2019         RE: Jose Reid  [address]  [facility] [address]        Dear Colleague,    Thank you for referring your patient, Jose Reid, to the [facility]. Please see a copy of my visit note below.    ESTABLISHED PATIENT NEUROLOGY NOTE    DATE OF VISIT: 10/29/2019  CLINIC LOCATION: [facility]  MRN: [id number]  PATIENT NAME: Jose Reid  YOB: 1960    PCP: REINIER PINO PA-C    REASON FOR VISIT:   Chief Complaint   Patient presents with     Follow Up     EMG     SUBJECTIVE:                                                      HISTORY OF PRESENT ILLNESS: Patient is here for a follow up regarding peripheral polyneuropathy, bilateral foot drops, bilateral asymmetric intrinsic hand muscle weakness, fasciculations, and balance difficulty. Please refer to my initial note from 09/24/2019 for further information.  The patient is accompanied by his wife, who participates in interview.    Since the last visit, the patient reports no significant changes in his symptoms.  He reports occasional bilateral hand twitching after he had an EMG.  Denies interval development of new neurological symptoms.    EMG from 10/22/2019 was abnormal and very complex.  Severe sensorimotor polyneuropathy, predominantly affecting distal lower limbs, bilateral median neuropathies at the wrist, bilateral ulnar neuropathies at the elbow, chronic multilevel cervical C7-8 radiculopathies and chronic lumbosacral radiculopathies affecting L4 and possibly L5-S1 nerve roots were noted.  It felt that even though superimposed polyneuropathy limited the interpretation, both ulnar neuropathies and right median neuropathy appeared to be severe, while the left medial neuropathy appeared moderate in severity.  No evidence of generalized disorder of lower motor neurons was seen.  It was commented that conduction slowing seen during the study possibly explained by an amplitude  dependent loss that could be seen with severe axonal neuropathies.  A consideration for painless diabetic motor neuropathy was advised on clinical grounds.    On review of systems, patient endorses no other active complaints. Medications, allergies, family and social history were also reviewed. There are no changes reported by patient.  REVIEW OF SYSTEMS:                                                    10-system review was completed. Pertinent positives are included in HPI. The remainder of ROS is negative.  EXAM:                                                    Physical Exam:   Vitals: /78 (BP Location: Left arm, Patient Position: Sitting, Cuff Size: Adult Regular)   Pulse 87   Temp 98.3  F (36.8  C) (Oral)   Wt 92.1 kg (203 lb)   SpO2 98%   BMI 28.31 kg/m       General: pt is in NAD, cooperative.  Skin: normal turgor, moist mucous membranes, no lesions/rashes noticed.  HEENT: ATNC, white sclera, normal conjunctiva.  Respiratory: Symmetric lung excursion, no accessory respiratory muscle use.  Abdomen: Non distended.  Neurological: awake, cooperative, follows commands, no aphasia or dysarthria noted, cranial nerves II-XII: no ptosis, extraocular motility is full, face is symmetric, tongue is midline, strength is reduced to 4-4.5/5 in intrinsic muscles of both hands and 3/5 dorsiflexion bilaterally, left FDI fasciculations, walks with bilateral steppage.  Uses a cane.  DATA:   EMG: I personally reviewed tabulated data from EMG report, as detailed in the history of present illness.  ASSESSMENT AND PLAN:                                                    Assessment: 59-year-old male patient with bilateral peripheral polyneuropathy/bilateral foot drops, bilateral asymmetric intrinsic hand muscle weakness, occasional fasciculations, and balance difficulty presents for a follow-up after the completion of recommended EMG.    We had a detailed discussion with the patient and his wife regarding his current  symptoms, EMG results, available treatment options, and the plan.  At the initial visit the concern was raised for a possibility of motor neuron disease or other conditions such as multifocal motor neuropathy or diabetic amyotrophy.  However, the results of EMG were negative for alternative diagnosis.  It appears that he has severe sensorimotor polyneuropathy versus painless diabetic motor polyneuropathy superimposed on bilateral severe ulnar neuropathies at the elbow, severe right and moderate left median neuropathies at the wrists, and chronic (not currently active) multilevel cervical and lumbar radiculopathies.  Polyneuropathy is most likely caused by diabetes, though I added labs to search for additional treatable causes.    We discussed available treatments for each condition that include vigilant control of diabetes to prevent further progression of diabetic polyneuropathy along with regular feet care.  We also talked about treatments of carpal tunnel syndrome and ulnar neuropathies that include splinting, steroid injections, and surgery (which is advised with severe neuropathy).  The patient was issued prescriptions for bilateral wrist and elbow splints, elbow pads, and was referred to discuss surgical treatments.  Occupational therapy referral for hand therapy was also placed.    For cervical and lumbar radiculopathies along with other conditions, he was advised to continue physical therapy.    Diagnoses:    ICD-10-CM    1. Diabetic polyneuropathy associated with type 2 diabetes mellitus (H) E11.42    2. Bilateral carpal tunnel syndrome G56.03    3. Ulnar neuropathy of both upper extremities G56.23    4. Cervical radiculopathy M54.12    5. Lumbar radiculopathy M54.16    6. Fasciculations of muscle R25.3    7. Atrophy of muscle of multiple sites M62.59      Plan: At today's visit we thoroughly discussed current symptoms, EMG results (diabetic polyneuropathy, bilateral carpal tunnel, bilateral ulnar  neuropathies, cervical and lumbar radiculopathies), available treatment options, and the plan.    I counseled the patient to wear bilateral wrist splints every night and during the day with repetitive wrist movements.  He was also advised to wear bilateral elbow splints every night and bilateral elbow pads during the day.  I placed a referral to orthopedic surgeon to discuss possible surgical treatments.    We discussed the importance of good blood sugar control to prevent further progression of his diabetic polyneuropathy.  Additional information was provided for home review.  We will perform lab work to check for additional causes of neuropathy.    Next follow-up appointment is in the next 6 months or earlier if needed.    Total Time: 45 minutes with > 50% spent counseling the patient and his wife on stated above assessment and recommendations.    Xu Lentz MD  / Neurology      Again, thank you for allowing me to participate in the care of your patient.        Sincerely,        Xu Lentz MD

## 2019-10-31 NOTE — PROGRESS NOTES
[facility] lab called and Heavy metal test had to be cancelled due to improper processing.   VM message left for patient to came back for redraw. Test has been furtured.    Thanks,  Laith griffin

## 2019-11-01 NOTE — PROGRESS NOTES
Clinic Care Coordination Contact  [facility]/Voicemail       Clinical Data: Care Coordinator Outreach  Outreach attempted x 3.  Left message on patient's voicemail with call back information and requested return call.  Plan: Care Coordinator will send disenrollment letter with care coordinator contact information via [employer]. Care Coordinator will do no further outreaches at this time.          Winston Haddad MSN, RN, PHN, CCM   Primary Care Clinical RN Care Coordinator  [facility]  Employee of [facility]   [email or web address]  Office: [phone]

## 2019-11-07 PROBLEM — I50.9 CONGESTIVE HEART FAILURE, UNSPECIFIED HF CHRONICITY, UNSPECIFIED HEART FAILURE TYPE (H): Status: ACTIVE | Noted: 2019-01-01

## 2019-11-07 NOTE — PROGRESS NOTES
Subjective     Jose Reid is a 59 year old male who presents to clinic today for the following health issues:    HPI   Diabetes Follow-up    How often are you checking your blood sugar? Two times daily - Variable - a bit higher than usual 200-300 the past day or two. Thinks it has been diet.   Blood sugar testing frequency justification:  Uncontrolled diabetes  What time of day are you checking your blood sugars (select all that apply)?  Before meals  Have you had any blood sugars above 200?  Yes   Have you had any blood sugars below 70?  No    What symptoms do you notice when your blood sugar is low?  None    What concerns do you have today about your diabetes? Other: Abnormal albumin results and weight gain     Do you have any of these symptoms? (Select all that apply)  Numbness in feet and Weight gain     Have you had a diabetic eye exam in the last 12 months? No    Diabetes Management Resources    Hyperlipidemia Follow-Up      Are you having any of the following symptoms? (Select all that apply)  No complaints of shortness of breath, chest pain or pressure.  No increased sweating or nausea with activity.  No left-sided neck or arm pain.  No complaints of pain in calves when walking 1-2 blocks.     Are you regularly taking any medication or supplement to lower your cholesterol?   Yes- Ezetimibe    Are you having muscle aches or other side effects that you think could be caused by your cholesterol lowering medication?  No     Hypertension Follow-up      Do you check your blood pressure regularly outside of the clinic? Yes     Are you following a low salt diet? Yes    Are your blood pressures ever more than 140 on the top number (systolic) OR more   than 90 on the bottom number (diastolic), for example 140/90? Yes     CHF - seeing [facility]. No current major symptoms, though he's fatigued and tired at baseline lately. Denies new shortness of breath. Overall, symptoms are relatively stable. Weight is up a few  pounds. His dry weight is probably closer to 195-200 range. He denies lower extremity swelling.     BP Readings from Last 2 Encounters:   11/07/19 (!) 150/96   10/29/19 131/78     Hemoglobin A1C (%)   Date Value   08/19/2019 10.4 (H)   08/05/2019 12.6 (H)     LDL Cholesterol Calculated (mg/dL)   Date Value   03/01/2019 190 (H)     LDL Cholesterol Direct (mg/dl)   Date Value   07/03/2012 82   06/08/2011 72         How many servings of fruits and vegetables do you eat daily?  4 or more    On average, how many sweetened beverages do you drink each day (soda, juice, sweet tea, etc)?   0    How many days per week do you miss taking your medication? 0    Patient Active Problem List   Diagnosis     Type 2 diabetes, HbA1c goal < 7% (H)     Hyperlipidemia with target LDL less than 100     Obesity     Back abscess     HTN (hypertension)     CHF exacerbation (H)     Diabetic polyneuropathy associated with type 2 diabetes mellitus (H)     Congestive heart failure, unspecified HF chronicity, unspecified heart failure type (H)     Past Surgical History:   Procedure Laterality Date     APPENDECTOMY       INCISION AND DRAINAGE, ABSCESS, SIMPLE N/A 7/19/2019    Procedure: INCISION AND DRAINAGE OF BACK ABSCESS;  Surgeon: Richard Lopez MD;  [facility]       Social History     Tobacco Use     Smoking status: Never Smoker     Smokeless tobacco: Never Used   Substance Use Topics     Alcohol use: No     Frequency: Never     Family History   Problem Relation Age of Onset     Hypertension Maternal Grandmother      Diabetes Maternal Grandmother      Family History Negative Mother      Unknown/Adopted Father      Diabetes Paternal Grandmother      Cerebrovascular Disease Paternal Grandmother      Family History Negative Brother      Family History Negative Sister      Family History Negative Son      Family History Negative Daughter          Reviewed and updated as needed this visit by Provider         Review of Systems   ROS  "COMP: Constitutional, HEENT, cardiovascular, pulmonary, GI, , musculoskeletal, neuro, skin, endocrine and psych systems are negative, except as otherwise noted.      Objective    BP (!) 150/96 (BP Location: Right arm, Patient Position: Chair, Cuff Size: Adult Regular)   Pulse 88   Temp 98  F (36.7  C) (Oral)   Ht 1.803 m (5' 11\")   Wt 93.5 kg (206 lb 3.2 oz)   BMI 28.76 kg/m    Body mass index is 28.76 kg/m .  Physical Exam   GENERAL: healthy, alert and no distress  HENT: ear canals and TM's normal, nose and mouth without ulcers or lesions  NECK: no adenopathy, no asymmetry, masses, or scars and thyroid normal to palpation  RESP: lungs clear to auscultation - no rales, rhonchi or wheezes  CV: regular rate and rhythm, normal S1 S2, no S3 or S4, no murmur, click or rub, no peripheral edema and peripheral pulses strong  MS: no gross musculoskeletal defects noted, no edema  SKIN: no suspicious lesions or rashes  NEURO: Normal strength and tone, mentation intact and speech normal  PSYCH: mentation appears normal, affect normal/bright    Diagnostic Test Results:  Labs reviewed in Epic        Assessment & Plan     (I50.9) Congestive heart failure, unspecified HF chronicity, unspecified heart failure type (H)  (primary encounter diagnosis)  Comment:   Plan: Stable but a bit of weight gain. He agrees to monitor and contact his [facility] team if his weight goes up more. He can take an extra Lasix today.    (E11.59) Type 2 diabetes mellitus with other circulatory complication, unspecified whether long term insulin use (H)  Comment:   Plan: Hemoglobin A1c, Basic metabolic panel  (Ca, Cl,        CO2, Creat, Gluc, K, Na, BUN)          Lab Results   Component Value Date    A1C 11.4 11/07/2019     Unfortunately still very elevated. Reviewed - recommend better diet, closer adherence to insulin. I think he can increase his basal insulin safely and more tightly titrate pre-meal. Will recheck in 3 months. He agrees to track sugars. "     (E11.42) Diabetic polyneuropathy associated with type 2 diabetes mellitus (H)  Comment:   Plan: See Neurology notes. Improving a little bit with improving glucose ranges.     (I10) Hypertension, unspecified type  Comment:   Plan: BP is elevated. Reviewed options. He wants to leave his management where it is and declines any management changes.      Follow up in 3 months. Earlier if issues.  Marcial Larry, FORTINOS, PA-C

## 2019-11-08 NOTE — PROGRESS NOTES
Called patient - in addition to his BP being monitored at cardiac rehab, he is also checking it at home. [facility] will connect w/him by the middle of next week to assess for need to increase losartan per Ludmila Soares(see her note below). Reminder note sent to board.  Cherri Michel RN on 11/8/2019 at 4:23 PM

## 2019-11-08 NOTE — PROGRESS NOTES
Regarding his uncontrolled BP, let's check his recordings at rehab again next week and if he is persistently hypertensive, will increase Losartan back to 50 mg daily, add a BMP on return to clinic. Thanks.

## 2019-11-08 NOTE — PROGRESS NOTES
[facility]  Incoming call from patient w/concerns about weight gain and increased BP readings    Recent weights:    11/8 201.4   11/7 202(pt took lasix 80mg per PCP recommendation)   11/6 202   11/5 200.4  Prior to this patient maintained at his dry weight 195 - 197#    Current diuretic:   lasix 40mg/d   spironolactone 25mg/d    SOB: Patient continues to have some increased SOB, but not to the degree he had 2 days ago when his weight @202#. He notices some improvement, but still gets more OOB than usual when going up stairs. He also continues to feel some mild congestion in his chest.  He went to PT yesterday and was not able to do as much as usual d/t fatigue. He denied any orthopnea/PND  BLE Edema/ Abdominal Edema: his R leg swollen 2 days ago - today it is back to normal. His abdomen also was bloated, it is better but not back to normal    He has also noted that his BP has been running higher than usual.    11/7 160/100   11/6 158/98  Today it is closer to his baseline @ 138/80.     He added that he had nasal congestion, low grade fever, malaise ~2-3 days ago and was in bed those days. He saw his PCP yesterday(Thurs 11/7) and was directed to take extra 40mg lasix which he did. This improved his sx, but he does not yet feel back to his baseline.     Assessment and Plan:    Will forward to Ludmila Soares for review  Cherri Michel RN on 11/8/2019 at 11:29 AM    Future Appointments   Date Time Provider Department Center   11/11/2019  8:00 AM 1,  [facility]   11/13/2019  8:00 AM 1,  [facility]   11/14/2019  9:00 AM Maddi Beth, PT [facility]   11/15/2019  8:00 AM 1,  Card[facility]   11/19/2019  8:00 AM 2,  Card[facility] Re[facility]   11/21/2019  9:00 AM Maddi Beth, PT [facility]   11/22/2019  9:50 AM DO LAB [facility]   11/22/2019 10:45 AM Guzman San,  MD [facility]   12/5/2019  9:00 AM Maddi Beth, PT [facility] [address]   12/12/2019  9:00 AM Maddi Beth, PT [facility] [address]   12/19/2019  9:00 AM Maddi Beth, PT [facility] [address]   12/26/2019  9:00 AM Maddi Beth, PT [facility] [address]   4/29/2020 10:00 AM Xu Lentz, MD [facility]

## 2019-11-08 NOTE — PROGRESS NOTES
Let's put him on a sliding scale regimen for now, torsemide 60 mg for wt >197 lbs, 40 mg daily for wt < 197 lbs. Did PCP adjust anything for hypertension? Thanks.

## 2019-11-08 NOTE — PROGRESS NOTES
Patient called w/instructions re:lasix. He verbalized understanding and had no questions. Med list updated and new Rx sent to his preferred pharmacy.     He stated that his PCP did not adjust any of his BP meds. Will update Ludmila Soares.  Cherri Michel RN on 11/8/2019 at 12:39 PM

## 2019-11-13 NOTE — PROGRESS NOTES
Received phone call back from patient regarding weight and blood pressure follow up. Patient states that his blood pressures have come down and have been more to his baseline 130's/80's. Patient states that his blood pressure did become elevated while exercising at cardiac rehab to 152/80 but came down when he rested. He states that he's still taking 60 mg of Lasix daily until his weight is <197#. Patient states that his weight's have been staying at 200#. Patient states that he's still having issues with chest congestion after being sick last week. He states that he doesn't believe this is fluid related, but he's still having issues with fatigue. He states that he doesn't have any leg swelling but he notices that his abdomen is slightly bloated.       ROGER Ramirez November 13, 2019 3:05 PM

## 2019-11-13 NOTE — PROGRESS NOTES
"Thank you. BP's looked very appropriate at rehab two days ago. Would make no changes for now. If his weight falls to 197 but his \"chest congestion\" persists we should have him get a CXR. Thanks.   "

## 2019-11-13 NOTE — PROGRESS NOTES
Called patient to follow up on BP's. Left voice mail for call back.         ROGER Ramirez November 13, 2019 9:21 AM

## 2019-11-18 NOTE — PROGRESS NOTES
"[facility]    Reviewed notes from last two weeks from CORE team. Pt called w/ concerns re: wt gain and increased BP. Lasix sliding scale initiated 11/8 and plan made to follow-up on cardiac rehab BPs.     Follow-up phone call 11/13 noted wt remained around ~200# despite higher lasix and he reported \"chest congestion\", fatigue and abd bloating.     Called pt to assess home BP/wt; no answer, left  requesting call back. Sent reminder to CORE RN board to call pt again 11/20 if we've not heard back. Pt has follow-up w/ Dr. San this Friday.     11/15/19 cardiac rehab  \"\"    Recent weights:               11/8     201.4              11/7     202(pt took lasix 80mg per PCP recommendation)              11/6     202              11/5     200.4  Prior to this patient maintained at his dry weight 195 - 197#    Current diuretic:              lasix sliding scale    Lasix 40mg for wt >197 lbs, 40 mg daily for wt < 197 lbs              spironolactone 25mg/d    Future Appointments   Date Time Provider Department Center   11/19/2019  8:00 AM 2, [facility]   11/22/2019  9:50 AM DO LAB [facility]   11/22/2019 10:45 AM Guzman San MD [facility]     Krystyna Sylvester RN BSN   3:38 PM 11/18/19        "

## 2019-11-20 NOTE — PROGRESS NOTES
[facility]    Called pt to follow-up on home BP and wt after diuretic change. Left another  requesting call back today. He has follow-up w/ Dr. San 11/22-see below.    Data from 11/19/19 cardiac rehab notes:          Future Appointments   Date Time Provider Department Center   11/22/2019  9:50 AM DO LAB [facility]   11/22/2019 10:45 AM Guzman San MD [facility]   12/5/2019  9:00 AM Maddi Beth, PT [facility] [address]   12/12/2019  9:00 AM Maddi Beth, PT [facility] [address]   12/19/2019  9:00 AM Maddi Beth, PT [facility] [address]   12/26/2019  9:00 AM Maddi Beth, PT [facility] [address]   4/29/2020 10:00 AM Xu Lentz MD [facility] KEVIN Sylvester RN BSN   1:36 PM 11/20/19

## 2019-11-21 NOTE — PROGRESS NOTES
Called patient to follow up on blood pressures and weight/symptoms since diuretic sliding scale was initiated last week. Patient states that his weight has still been 197 # or greater and has been taking 60 mg of Lasix since. In regards to his symptoms, he states that his chest congestion is nearly gone. He states that he for sure had the flu, and overall has been feeling well and energetic. He does state that he has SOB with longer walks and significant SOB while walking up the stairs. He states that he sleeps well overnight with no issues laying flat and feelings of SOB. He has no swelling in his legs, but does note some swelling in his abdomen. His blood pressures have been stable, pre medications his SBP have been in the 150's and an hour after medications he's been running 130's. Has follow up appointment scheduled for tomorrow with Dr. San @ 10:45.    Future Appointments   Date Time Provider Department Center   11/22/2019  9:50 AM [facility]   11/22/2019 10:45 AM Guzman San MD [facility] CLIN   12/5/2019  9:00 AM Maddi Beth, PT [facility] [address]   12/12/2019  9:00 AM Maddi Beth, PT [facility] [address]   12/19/2019  9:00 AM Maddi Beth, PT [facility] [address]   12/26/2019  9:00 AM Maddi Beth, PT [facility] [address]   4/29/2020 10:00 AM Xu Lentz MD [facility], RN November 21, 2019 11:39 AM

## 2019-11-22 NOTE — PROGRESS NOTES
Service Date: 11/22/2019      OFFICE PROGRESS NOTE       REASON FOR CLINIC VISIT:  Followup cardiomyopathy.      HISTORY OF PRESENT ILLNESS:  Mr. Reid is a very pleasant 59-year-old gentleman who I saw initially in 08/2019 when he was admitted with very uncontrolled elevated blood pressure, orthopnea, dyspnea on exertion, all consistent with decompensated congestive heart failure.  He was noted to have LVEF around 45%.  He was started on antihypertensive medication and cardiomyopathy medication and underwent diuresis.  He was also noted to have uncontrolled diabetes with hemoglobin A1c earlier this year greater than 15.  Also, dyslipidemia with LDL greater than 190 with intolerance to statins.  Subsequently, the patient underwent cardiac MRI stress perfusion that did not reveal any evidence of ischemia.  LVEF actually improved on the cardiac MRI to around 63%.  Delayed enhancement, somewhat technically challenging, showed small area of nonspecific fibrosis at the RV attachment point inferiorly.  In the followup, the patient has been seen by Ludmila Soares.  He felt better in terms of his overall symptoms and today, he is coming for routine followup.  He is accompanied by his wife.  The patient tells me that he never felt completely back to normal which he felt earlier this summer before all these symptoms started.  His main issue is that he gets dyspnea on exertion with walking.  For example, he visited his daughter in [place] and then he was climbing the subway stairs.  Every few steps, he had to take a break because he would get short of breath.  Recently, about 2 weeks ago, the patient had a flu from which he is still recovering.  He gained some weight, around 3-4 pounds, and his Lasix dose was escalated and he lost about a pound but since that time, his weights have been stable around 204 pounds or so.  The patient tells me that he never completely recovered in terms of dyspnea on exertion, even after the blood  pressure was controlled and his cardiomyopathy medication was started.  No exertional chest pain or chest pain at rest.  He has intolerance to statins.  His LDL was 190 and in response to Zetia, it has improved to 117.  He does have chronic kidney disease stage III with baseline creatinine around 1.3 to 1.4.  Today, creatinine is around 1.41.  He is on carvedilol 25 mg b.i.d.  He is on losartan 25 mg daily, spironolactone 25 mg daily, Lasix 60 mg daily.  To be noted, earlier this summer, the patient also had hospitalization due to back abscess and cellulitis due to MSSA.      PHYSICAL EXAMINATION:   VITAL SIGNS:  Blood pressure 128/82, heart rate 81 and regular, weight 206 pounds, BMI 28.81.   GENERAL:  The patient appears pleasant, comfortable.   NECK:  Normal JVP, negative hepatojugular reflux.   CARDIOVASCULAR SYSTEM:  S1, S2 normal, no murmur, rub or gallop.   RESPIRATORY:  Clear to auscultation bilaterally.   GASTROINTESTINAL SYSTEM:  Abdomen soft, nontender.   EXTREMITIES:  No pitting pedal edema.   NEUROLOGIC:  Alert, oriented x3.   PSYCHIATRIC:  Normal affect.   SKIN:  No obvious rash.    HEENT:  No pallor or icterus.      IMPRESSION AND PLAN:  A pleasant 59-year-old gentleman who recently had acute decompensated congestive heart failure with borderline to mildly reduced LV systolic function in the setting of uncontrolled hypertension.  Other comorbidities of diabetes which has been historically very poorly controlled although hemoglobin A1c is better but still more than 11, dyslipidemia with LDL of 190 and history of intolerance to statins, now LDL improved to 117 on Zetia, chronic kidney disease stage III.  Remarkably, although the patient is feeling much better compared to August when he was diagnosed with congestive heart failure, he still has dyspnea on exertion.  One of the reasons why dyspnea on exertion could be there is of course some physical deconditioning, as patient tells me that due to  neuropathy affecting his lower extremity, he has been less active and also recently had flu and is recovering from that.  The other possibility of dyspnea on exertion could be that this may represent an anginal equivalent, although he does not have typical exertional chest discomfort but he does have significant CAD risk factors including uncontrolled risk factors of diabetes and until recently, uncontrolled hypertension and also dyslipidemia.  I had a long discussion with the patient and his wife about some clinical concerns regarding dyspnea on exertion being an anginal equivalent.  We discussed sensitivity and specificity of stress test which he had recently which did not reveal any ischemia.  We discussed about coronary angiogram as the most definite way of ruling out coronary artery disease.  We discussed risks and benefits of coronary angiogram.  I recommended that coronary angiogram with right heart catheterization is quite reasonable in this situation to further evaluate the dyspnea on exertion and also to measure hemodynamics, as on my clinical evaluation, I do not find him volume overloaded.  The patient and his wife tell me that they are going to think about it.  They did express apprehension about potential risk of infection.  I discussed with them that there is a small, not high, risk of infection with coronary angiogram and we use sterile precautions.  For now, the patient's wife tells me that they would like to think about it.  They would like to increase his physical activity, as they are anticipating that he is going to get lower extremity braces which will make him walk easier.  Of course, if this is due to deconditioning, I expect that as he increases physical activity, he should feel better.  However, if his symptoms worsen or does not improve with increased amount of gradual physical activity, I think option of coronary angiogram will probably need to be revisited by patient.  I recommend  followup in about 6-8 weeks' time, sooner if he notes any change in clinical status.  I recommend continuing current cardiac medications.  We also talked about importance of salt restriction, daily weights, especially with the holiday season coming up.         DANIELLE CLOUD MD             D: 2019   T: 2019   MT: JAYRO      Name:     KATLYN RAMIREZ   MRN:      [id number]        Account:      [id number]   :      1960           Service Date: 2019      Document: [id number]

## 2019-11-22 NOTE — LETTER
11/22/2019      REINIER PINO PA-C  [address] [facility] [address]      RE: Jose Reid       Dear Colleague,    I had the pleasure of seeing Jose Reid in the [facility].    Service Date: 11/22/2019      OFFICE PROGRESS NOTE       REASON FOR CLINIC VISIT:  Followup cardiomyopathy.      HISTORY OF PRESENT ILLNESS:  Mr. Reid is a very pleasant 59-year-old gentleman who I saw initially in 08/2019 when he was admitted with very uncontrolled elevated blood pressure, orthopnea, dyspnea on exertion, all consistent with decompensated congestive heart failure.  He was noted to have LVEF around 45%.  He was started on antihypertensive medication and cardiomyopathy medication and underwent diuresis.  He was also noted to have uncontrolled diabetes with hemoglobin A1c earlier this year greater than 15.  Also, dyslipidemia with LDL greater than 190 with intolerance to statins.  Subsequently, the patient underwent cardiac MRI stress perfusion that did not reveal any evidence of ischemia.  LVEF actually improved on the cardiac MRI to around 63%.  Delayed enhancement, somewhat technically challenging, showed small area of nonspecific fibrosis at the RV attachment point inferiorly.  In the followup, the patient has been seen by Ludmila Soares.  He felt better in terms of his overall symptoms and today, he is coming for routine followup.  He is accompanied by his wife.  The patient tells me that he never felt completely back to normal which he felt earlier this summer before all these symptoms started.  His main issue is that he gets dyspnea on exertion with walking.  For example, he visited his daughter in [place] and then he was climbing the subway stairs.  Every few steps, he had to take a break because he would get short of breath.  Recently, about 2 weeks ago, the patient had a flu from which he is still recovering.  He gained some weight, around 3-4 pounds, and his Lasix  dose was escalated and he lost about a pound but since that time, his weights have been stable around 204 pounds or so.  The patient tells me that he never completely recovered in terms of dyspnea on exertion, even after the blood pressure was controlled and his cardiomyopathy medication was started.  No exertional chest pain or chest pain at rest.  He has intolerance to statins.  His LDL was 190 and in response to Zetia, it has improved to 117.  He does have chronic kidney disease stage III with baseline creatinine around 1.3 to 1.4.  Today, creatinine is around 1.41.  He is on carvedilol 25 mg b.i.d.  He is on losartan 25 mg daily, spironolactone 25 mg daily, Lasix 60 mg daily.  To be noted, earlier this summer, the patient also had hospitalization due to back abscess and cellulitis due to MSSA.      PHYSICAL EXAMINATION:   VITAL SIGNS:  Blood pressure 128/82, heart rate 81 and regular, weight 206 pounds, BMI 28.81.   GENERAL:  The patient appears pleasant, comfortable.   NECK:  Normal JVP, negative hepatojugular reflux.   CARDIOVASCULAR SYSTEM:  S1, S2 normal, no murmur, rub or gallop.   RESPIRATORY:  Clear to auscultation bilaterally.   GASTROINTESTINAL SYSTEM:  Abdomen soft, nontender.   EXTREMITIES:  No pitting pedal edema.   NEUROLOGIC:  Alert, oriented x3.   PSYCHIATRIC:  Normal affect.   SKIN:  No obvious rash.    HEENT:  No pallor or icterus.      IMPRESSION AND PLAN:  A pleasant 59-year-old gentleman who recently had acute decompensated congestive heart failure with borderline to mildly reduced LV systolic function in the setting of uncontrolled hypertension.  Other comorbidities of diabetes which has been historically very poorly controlled although hemoglobin A1c is better but still more than 11, dyslipidemia with LDL of 190 and history of intolerance to statins, now LDL improved to 117 on Zetia, chronic kidney disease stage III.  Remarkably, although the patient is feeling much better compared to  August when he was diagnosed with congestive heart failure, he still has dyspnea on exertion.  One of the reasons why dyspnea on exertion could be there is of course some physical deconditioning, as patient tells me that due to neuropathy affecting his lower extremity, he has been less active and also recently had flu and is recovering from that.  The other possibility of dyspnea on exertion could be that this may represent an anginal equivalent, although he does not have typical exertional chest discomfort but he does have significant CAD risk factors including uncontrolled risk factors of diabetes and until recently, uncontrolled hypertension and also dyslipidemia.  I had a long discussion with the patient and his wife about some clinical concerns regarding dyspnea on exertion being an anginal equivalent.  We discussed sensitivity and specificity of stress test which he had recently which did not reveal any ischemia.  We discussed about coronary angiogram as the most definite way of ruling out coronary artery disease.  We discussed risks and benefits of coronary angiogram.  I recommended that coronary angiogram with right heart catheterization is quite reasonable in this situation to further evaluate the dyspnea on exertion and also to measure hemodynamics, as on my clinical evaluation, I do not find him volume overloaded.  The patient and his wife tell me that they are going to think about it.  They did express apprehension about potential risk of infection.  I discussed with them that there is a small, not high, risk of infection with coronary angiogram and we use sterile precautions.  For now, the patient's wife tells me that they would like to think about it.  They would like to increase his physical activity, as they are anticipating that he is going to get lower extremity braces which will make him walk easier.  Of course, if this is due to deconditioning, I expect that as he increases physical activity, he  should feel better.  However, if his symptoms worsen or does not improve with increased amount of gradual physical activity, I think option of coronary angiogram will probably need to be revisited by patient.  I recommend followup in about 6-8 weeks' time, sooner if he notes any change in clinical status.  I recommend continuing current cardiac medications.  We also talked about importance of salt restriction, daily weights, especially with the holiday season coming up.         DANIELLE CLOUD MD             D: 2019   T: 2019   MT: JAYRO      Name:     KATLYN RAMIREZ   MRN:      [id number]        Account:      [id number]   :      1960           Service Date: 2019      Document: [id number]           Outpatient Encounter Medications as of 2019   Medication Sig Dispense Refill     alpha-lipoic acid 100 MG capsule Take 600 mg by mouth daily       aspirin 81 MG EC tablet Take 81 mg by mouth At Bedtime  90 tablet 3     carvedilol (COREG) 25 MG tablet Take 1 tablet (25 mg) by mouth 2 times daily (with meals) 60 tablet 11     ezetimibe (ZETIA) 10 MG tablet Take 1 tablet (10 mg) by mouth At Bedtime 30 tablet 11     insulin aspart (NOVOLOG PEN) 100 UNIT/ML pen Pre-Meal 140-189 =1 unit,190-239 =2 units,240-289 =3 units,290-339 =4 units,= or >340 =5 units; Max 15 units/day 27 mL 1     insulin glargine (LANTUS PEN) 100 UNIT/ML pen Inject 50 Units Subcutaneous At Bedtime 24 mL 1     losartan (COZAAR) 50 MG tablet Take 0.5 tablets (25 mg) by mouth daily (with dinner) 15 tablet 11     order for DME Equipment being ordered: Bilateral wrist splints. 1 each 0     order for DME Equipment being ordered: Bilateral ulnar braces and ulnar pads. 1 each 0     order for DME Equipment being ordered: bilateral AFO's. 1 each 0     spironolactone (ALDACTONE) 25 MG tablet Take 1 tablet (25 mg) by mouth daily 30 tablet 11     vitamin B complex with vitamin C (STRESS TAB) tablet Take 1 tablet by mouth At Bedtime        Vitamin D, Cholecalciferol, 1000 units TABS Take 2 tablets by mouth At Bedtime strength unknown        [DISCONTINUED] furosemide (LASIX) 40 MG tablet Take 1 tablet(40mg) by mouth every day for weight <197#. Take 1 1/2 tablets(60mg) for weight >/= 197# 135 tablet 3     cyanocobalamin (CYANOCOBALAMIN) 1000 MCG/ML injection Inject 1,000 mcg into the muscle every 3 days        No facility-administered encounter medications on file as of 11/22/2019.        Again, thank you for allowing me to participate in the care of your patient.      Sincerely,    Guzman San MD     [facility]

## 2019-11-22 NOTE — LETTER
11/22/2019      REINIER PINO PA-C  [address] [facility] [address]      RE: Jose Reid       Dear Colleague,    I had the pleasure of seeing Jose Reid in the [facility].    Service Date: 11/22/2019      OFFICE PROGRESS NOTE       REASON FOR CLINIC VISIT:  Followup cardiomyopathy.      HISTORY OF PRESENT ILLNESS:  Mr. Reid is a very pleasant 59-year-old gentleman who I saw initially in 08/2019 when he was admitted with very uncontrolled elevated blood pressure, orthopnea, dyspnea on exertion, all consistent with decompensated congestive heart failure.  He was noted to have LVEF around 45%.  He was started on antihypertensive medication and cardiomyopathy medication and underwent diuresis.  He was also noted to have uncontrolled diabetes with hemoglobin A1c earlier this year greater than 15.  Also, dyslipidemia with LDL greater than 190 with intolerance to statins.  Subsequently, the patient underwent cardiac MRI stress perfusion that did not reveal any evidence of ischemia.  LVEF actually improved on the cardiac MRI to around 63%.  Delayed enhancement, somewhat technically challenging.  Showed small area of nonspecific fibrosis at the RV attachment point inferiorly.  In the followup, the patient has been seen by Ludmila Soares.  He felt better in terms of his overall symptoms and today, he is coming for routine followup.  He is accompanied by his wife.  The patient tells me that he never felt completely back to normal which he felt earlier this summer before all these symptoms started.  His main issue is that he gets dyspnea on exertion with walking.  For example, he visited his daughter in [place] and then he was climbing the subway stairs.  Every few steps, he had to take a break because he would get short of breath.  Recently, about 2 weeks ago, the patient had a flu from which he is still recovering.  He gained some weight, around 3-4 pounds, and his Lasix  dose was escalated and he lost about a pound but since that time, his weights have been stable around 204 pounds or so.  The patient tells me that he never completely recovered in terms of dyspnea on exertion, even after the blood pressure was controlled and his cardiomyopathy medication was started.  No exertional chest pain or chest pain at rest.  He has intolerance to statins.  His LDL was 190 and in response to Zetia, it has improved to 117.  He does have chronic kidney disease stage III with baseline creatinine around 1.3 to 1.4.  Today, creatinine is around 1.41.  He is on carvedilol 25 mg b.i.d.  He is on losartan 25 mg daily, spironolactone 25 mg daily, Lasix 60 mg daily.  To be noted, earlier this summer, the patient also had hospitalization due to back abscess and cellulitis due to MSSA.      PHYSICAL EXAMINATION:   VITAL SIGNS:  Blood pressure 128/82, heart rate 81 and regular, weight 206 pounds, BMI 28.81.   GENERAL:  The patient appears pleasant, comfortable.   NECK:  Normal JVP, negative hepatojugular reflux.   CARDIOVASCULAR SYSTEM:  S1, S2 normal, no murmur, rub or gallop.   RESPIRATORY:  Clear to auscultation bilaterally.   GASTROINTESTINAL SYSTEM:  Abdomen soft, nontender.   EXTREMITIES:  No pitting pedal edema.   NEUROLOGIC:  Alert, oriented x3.   PSYCHIATRIC:  Normal affect.   SKIN:  No obvious rash.    HEENT:  No pallor or icterus.      IMPRESSION AND PLAN:  A pleasant 59-year-old gentleman who recently had acute decompensated congestive heart failure with borderline to mildly reduced LV systolic function in the setting of uncontrolled hypertension.  Other comorbidities of diabetes which has been historically very poorly controlled although hemoglobin A1c is better but still more than 11, dyslipidemia with LDL of 190 and history of intolerance to statins, now LDL improved to 117 on Zetia, chronic kidney disease stage III.  Remarkably, although the patient is feeling much better compared to  August when he was diagnosed with congestive heart failure, he still has dyspnea on exertion.  One of the reasons why dyspnea on exertion could be there is of course some physical deconditioning, as patient tells me that due to neuropathy affecting his lower extremity, he has been less active and also recently had flu and is recovering from that.  The other possibility of dyspnea on exertion could be that this may represent an anginal equivalent, although he does not have typical exertional chest discomfort but he does have significant CAD risk factors including uncontrolled risk factors of diabetes and until recently, uncontrolled hypertension and also dyslipidemia.  I had a long discussion with the patient and his wife about some clinical concerns regarding dyspnea on exertion *** anginal equivalent.  We discussed sensitivity and specificity of stress test which he had recently which did not reveal any ischemia.  We discussed about coronary angiogram as the most definite way of ruling out coronary artery disease.  We discussed risks and benefits of coronary angiogram.  I recommended that coronary angiogram with right heart catheterization is quite reasonable in this situation to further evaluate the dyspnea on exertion and also to measure hemodynamics, as on my clinical evaluation, I do not find him volume overloaded.  The patient and his wife tell me that they are going to think about it.  They did express apprehension about potential risk of infection.  I discussed with them that there is a small, not high, risk of infection with coronary angiogram and we use precautions.  For now, the patient's wife tells me that they would like to think about it.  They would like to increase his physical activity, as they are anticipating that he is going to get lower extremity braces which will make him walk easier.  Of course, if this is due to deconditioning, I expect that as he increases physical activity, he should feel  better.  However, if his symptoms worsen or does not improve with increased amount of gradual physical activity, I think option of coronary angiogram will probably need to be revisited.  I recommend followup in about 6-8 weeks' time, sooner if he notes any change in clinical status.  I recommend continuing current cardiac medications.  We also talked about importance of salt restriction, daily weights, especially with the holiday season coming up.         DANIELLE CLOUD MD             D: 2019   T: 2019   MT: JAYRO      Name:     KATLYN RAMIREZ   MRN:      [id number]        Account:      [id number]   :      1960           Service Date: 2019      Document: [id number]         Outpatient Encounter Medications as of 2019   Medication Sig Dispense Refill     alpha-lipoic acid 100 MG capsule Take 600 mg by mouth daily       aspirin 81 MG EC tablet Take 81 mg by mouth At Bedtime  90 tablet 3     carvedilol (COREG) 25 MG tablet Take 1 tablet (25 mg) by mouth 2 times daily (with meals) 60 tablet 11     ezetimibe (ZETIA) 10 MG tablet Take 1 tablet (10 mg) by mouth At Bedtime 30 tablet 11     furosemide (LASIX) 40 MG tablet Take 1 tablet(40mg) by mouth every day for weight <197#. Take 1 1/2 tablets(60mg) for weight >/= 197# 135 tablet 3     insulin aspart (NOVOLOG PEN) 100 UNIT/ML pen Pre-Meal 140-189 =1 unit,190-239 =2 units,240-289 =3 units,290-339 =4 units,= or >340 =5 units; Max 15 units/day 27 mL 1     insulin glargine (LANTUS PEN) 100 UNIT/ML pen Inject 50 Units Subcutaneous At Bedtime 24 mL 1     losartan (COZAAR) 50 MG tablet Take 0.5 tablets (25 mg) by mouth daily (with dinner) 15 tablet 11     order for DME Equipment being ordered: Bilateral wrist splints. 1 each 0     order for DME Equipment being ordered: Bilateral ulnar braces and ulnar pads. 1 each 0     order for DME Equipment being ordered: bilateral AFO's. 1 each 0     spironolactone (ALDACTONE) 25 MG tablet Take 1 tablet (25 mg)  by mouth daily 30 tablet 11     vitamin B complex with vitamin C (STRESS TAB) tablet Take 1 tablet by mouth At Bedtime       Vitamin D, Cholecalciferol, 1000 units TABS Take 2 tablets by mouth At Bedtime strength unknown        cyanocobalamin (CYANOCOBALAMIN) 1000 MCG/ML injection Inject 1,000 mcg into the muscle every 3 days        No facility-administered encounter medications on file as of 11/22/2019.        Again, thank you for allowing me to participate in the care of your patient.      Sincerely,    Guzman San MD     [facility]

## 2019-11-22 NOTE — PROGRESS NOTES
HPI and Plan:   See dictation(#[id number])    Orders Placed This Encounter   Procedures     Basic metabolic panel     [facility] with [facility] visit       Orders Placed This Encounter   Medications     alpha-lipoic acid 100 MG capsule     Sig: Take 600 mg by mouth daily       There are no discontinued medications.      Encounter Diagnosis   Name Primary?     Acute heart failure with preserved ejection fraction (H)        CURRENT MEDICATIONS:  Current Outpatient Medications   Medication Sig Dispense Refill     alpha-lipoic acid 100 MG capsule Take 600 mg by mouth daily       aspirin 81 MG EC tablet Take 81 mg by mouth At Bedtime  90 tablet 3     carvedilol (COREG) 25 MG tablet Take 1 tablet (25 mg) by mouth 2 times daily (with meals) 60 tablet 11     ezetimibe (ZETIA) 10 MG tablet Take 1 tablet (10 mg) by mouth At Bedtime 30 tablet 11     furosemide (LASIX) 40 MG tablet Take 1 tablet(40mg) by mouth every day for weight <197#. Take 1 1/2 tablets(60mg) for weight >/= 197# 135 tablet 3     insulin aspart (NOVOLOG PEN) 100 UNIT/ML pen Pre-Meal 140-189 =1 unit,190-239 =2 units,240-289 =3 units,290-339 =4 units,= or >340 =5 units; Max 15 units/day 27 mL 1     insulin glargine (LANTUS PEN) 100 UNIT/ML pen Inject 50 Units Subcutaneous At Bedtime 24 mL 1     losartan (COZAAR) 50 MG tablet Take 0.5 tablets (25 mg) by mouth daily (with dinner) 15 tablet 11     order for DME Equipment being ordered: Bilateral wrist splints. 1 each 0     order for DME Equipment being ordered: Bilateral ulnar braces and ulnar pads. 1 each 0     order for DME Equipment being ordered: bilateral AFO's. 1 each 0     spironolactone (ALDACTONE) 25 MG tablet Take 1 tablet (25 mg) by mouth daily 30 tablet 11     vitamin B complex with vitamin C (STRESS TAB) tablet Take 1 tablet by mouth At Bedtime       Vitamin D, Cholecalciferol, 1000 units TABS Take 2 tablets by mouth At Bedtime strength unknown        cyanocobalamin (CYANOCOBALAMIN) 1000 MCG/ML  injection Inject 1,000 mcg into the muscle every 3 days          ALLERGIES     Allergies   Allergen Reactions     Exenatide Rash     Hmg-Coa-R Inhibitors Muscle Pain (Myalgia) and Other (See Comments)     Extreme Fatigue       Amlodipine      Other reaction(s): Dizziness  Dizzy       Hctz [Hydrochlorothiazide]        PAST MEDICAL HISTORY:  Past Medical History:   Diagnosis Date     Abscess     MSSA back abscess     Diabetes (H)      Dyslipidemia      Erectile dysfunction      Hypertension      Peripheral neuropathy      Seasonal depression (H)        PAST SURGICAL HISTORY:  Past Surgical History:   Procedure Laterality Date     APPENDECTOMY       INCISION AND DRAINAGE, ABSCESS, SIMPLE N/A 7/19/2019    Procedure: INCISION AND DRAINAGE OF BACK ABSCESS;  Surgeon: Richard Lopez MD;  Location:  GI       FAMILY HISTORY:  Family History   Problem Relation Age of Onset     Hypertension Maternal Grandmother      Diabetes Maternal Grandmother      Family History Negative Mother      Unknown/Adopted Father      Diabetes Paternal Grandmother      Cerebrovascular Disease Paternal Grandmother      Family History Negative Brother      Family History Negative Sister      Family History Negative Son      Family History Negative Daughter        SOCIAL HISTORY:  Social History     Socioeconomic History     Marital status:      Spouse name: Not on file     Number of children: Not on file     Years of education: Not on file     Highest education level: Not on file   Occupational History     Not on file   Social Needs     Financial resource strain: Not on file     Food insecurity:     Worry: Not on file     Inability: Not on file     Transportation needs:     Medical: Not on file     Non-medical: Not on file   Tobacco Use     Smoking status: Never Smoker     Smokeless tobacco: Never Used   Substance and Sexual Activity     Alcohol use: No     Frequency: Never     Drug use: No     Sexual activity: Not on file  "  Lifestyle     Physical activity:     Days per week: Not on file     Minutes per session: Not on file     Stress: Not on file   Relationships     Social connections:     Talks on phone: Not on file     Gets together: Not on file     Attends [employer] service: Not on file     Active member of club or organization: Not on file     Attends meetings of clubs or organizations: Not on file     Relationship status: Not on file     Intimate partner violence:     Fear of current or ex partner: Not on file     Emotionally abused: Not on file     Physically abused: Not on file     Forced sexual activity: Not on file   Other Topics Concern     Parent/sibling w/ CABG, MI or angioplasty before 65F 55M? Not Asked   Social History Narrative     Not on file       Review of Systems:  Skin:  Negative       Eyes:  Positive for glasses    ENT:  Negative      Respiratory:  Positive for dyspnea on exertion;shortness of breath on occ   Cardiovascular:  Negative Positive for;dizziness;lightheadedness;edema;chest pain lightheadedness and dizziness with lasix. weight up by 3lbs still.holds weight in bellly  Gastroenterology: Negative      Genitourinary:  Negative      Musculoskeletal:  Negative      Neurologic:  Positive for numbness or tingling of feet all the time over the past two years  Psychiatric:  Negative      Heme/Lymph/Imm:  Negative      Endocrine:  Negative        Physical Exam:  Vitals: /82   Pulse 81   Ht 1.803 m (5' 11\")   Wt 93.7 kg (206 lb 9.6 oz)   BMI 28.81 kg/m      Constitutional:           Skin:             Head:           Eyes:           Lymph:      ENT:           Neck:           Respiratory:            Cardiac:                                                           GI:           Extremities and Muscular Skeletal:                 Neurological:           Psych:             CC  Marcial Larry, PA-C  [address] [place] [address]                  "

## 2019-11-22 NOTE — LETTER
11/22/2019    REINIER PINO PA-C  [address] [facility] [address]    RE: Jose Reid       Dear Colleague,    I had the pleasure of seeing Jose Reid in the [facility].    HPI and Plan:   See dictation(#[id number])    Orders Placed This Encounter   Procedures     Basic metabolic panel     [facility] with [facility] visit       Orders Placed This Encounter   Medications     alpha-lipoic acid 100 MG capsule     Sig: Take 600 mg by mouth daily       There are no discontinued medications.      Encounter Diagnosis   Name Primary?     Acute heart failure with preserved ejection fraction (H)        CURRENT MEDICATIONS:  Current Outpatient Medications   Medication Sig Dispense Refill     alpha-lipoic acid 100 MG capsule Take 600 mg by mouth daily       aspirin 81 MG EC tablet Take 81 mg by mouth At Bedtime  90 tablet 3     carvedilol (COREG) 25 MG tablet Take 1 tablet (25 mg) by mouth 2 times daily (with meals) 60 tablet 11     ezetimibe (ZETIA) 10 MG tablet Take 1 tablet (10 mg) by mouth At Bedtime 30 tablet 11     furosemide (LASIX) 40 MG tablet Take 1 tablet(40mg) by mouth every day for weight <197#. Take 1 1/2 tablets(60mg) for weight >/= 197# 135 tablet 3     insulin aspart (NOVOLOG PEN) 100 UNIT/ML pen Pre-Meal 140-189 =1 unit,190-239 =2 units,240-289 =3 units,290-339 =4 units,= or >340 =5 units; Max 15 units/day 27 mL 1     insulin glargine (LANTUS PEN) 100 UNIT/ML pen Inject 50 Units Subcutaneous At Bedtime 24 mL 1     losartan (COZAAR) 50 MG tablet Take 0.5 tablets (25 mg) by mouth daily (with dinner) 15 tablet 11     order for DME Equipment being ordered: Bilateral wrist splints. 1 each 0     order for DME Equipment being ordered: Bilateral ulnar braces and ulnar pads. 1 each 0     order for DME Equipment being ordered: bilateral AFO's. 1 each 0     spironolactone (ALDACTONE) 25 MG tablet Take 1 tablet (25 mg) by mouth daily 30 tablet 11     vitamin B complex  with vitamin C (STRESS TAB) tablet Take 1 tablet by mouth At Bedtime       Vitamin D, Cholecalciferol, 1000 units TABS Take 2 tablets by mouth At Bedtime strength unknown        cyanocobalamin (CYANOCOBALAMIN) 1000 MCG/ML injection Inject 1,000 mcg into the muscle every 3 days          ALLERGIES     Allergies   Allergen Reactions     Exenatide Rash     Hmg-Coa-R Inhibitors Muscle Pain (Myalgia) and Other (See Comments)     Extreme Fatigue       Amlodipine      Other reaction(s): Dizziness  Dizzy       Hctz [Hydrochlorothiazide]        PAST MEDICAL HISTORY:  Past Medical History:   Diagnosis Date     Abscess     MSSA back abscess     Diabetes (H)      Dyslipidemia      Erectile dysfunction      Hypertension      Peripheral neuropathy      Seasonal depression (H)        PAST SURGICAL HISTORY:  Past Surgical History:   Procedure Laterality Date     APPENDECTOMY       INCISION AND DRAINAGE, ABSCESS, SIMPLE N/A 7/19/2019    Procedure: INCISION AND DRAINAGE OF BACK ABSCESS;  Surgeon: Richard Lopez MD;  Location: [facility]       FAMILY HISTORY:  Family History   Problem Relation Age of Onset     Hypertension Maternal Grandmother      Diabetes Maternal Grandmother      Family History Negative Mother      Unknown/Adopted Father      Diabetes Paternal Grandmother      Cerebrovascular Disease Paternal Grandmother      Family History Negative Brother      Family History Negative Sister      Family History Negative Son      Family History Negative Daughter        SOCIAL HISTORY:  Social History     Socioeconomic History     Marital status:      Spouse name: Not on file     Number of children: Not on file     Years of education: Not on file     Highest education level: Not on file   Occupational History     Not on file   Social Needs     Financial resource strain: Not on file     Food insecurity:     Worry: Not on file     Inability: Not on file     Transportation needs:     Medical: Not on file     Non-medical: Not  "on file   Tobacco Use     Smoking status: Never Smoker     Smokeless tobacco: Never Used   Substance and Sexual Activity     Alcohol use: No     Frequency: Never     Drug use: No     Sexual activity: Not on file   Lifestyle     Physical activity:     Days per week: Not on file     Minutes per session: Not on file     Stress: Not on file   Relationships     Social connections:     Talks on phone: Not on file     Gets together: Not on file     Attends [employer] service: Not on file     Active member of club or organization: Not on file     Attends meetings of clubs or organizations: Not on file     Relationship status: Not on file     Intimate partner violence:     Fear of current or ex partner: Not on file     Emotionally abused: Not on file     Physically abused: Not on file     Forced sexual activity: Not on file   Other Topics Concern     Parent/sibling w/ CABG, MI or angioplasty before 65F 55M? Not Asked   Social History Narrative     Not on file       Review of Systems:  Skin:  Negative       Eyes:  Positive for glasses    ENT:  Negative      Respiratory:  Positive for dyspnea on exertion;shortness of breath on occ   Cardiovascular:  Negative Positive for;dizziness;lightheadedness;edema;chest pain lightheadedness and dizziness with lasix. weight up by 3lbs still.holds weight in bellly  Gastroenterology: Negative      Genitourinary:  Negative      Musculoskeletal:  Negative      Neurologic:  Positive for numbness or tingling of feet all the time over the past two years  Psychiatric:  Negative      Heme/Lymph/Imm:  Negative      Endocrine:  Negative        Physical Exam:  Vitals: /82   Pulse 81   Ht 1.803 m (5' 11\")   Wt 93.7 kg (206 lb 9.6 oz)   BMI 28.81 kg/m       Constitutional:           Skin:             Head:           Eyes:           Lymph:      ENT:           Neck:           Respiratory:            Cardiac:                                                           GI:           Extremities and " Muscular Skeletal:                 Neurological:           Psych:             CC  Marcial Larry PA-C  [address] [place] [address]                    Thank you for allowing me to participate in the care of your patient.      Sincerely,     Guzman San MD     [facility]    cc:   Marcial Larry PA-C  [address] [place] [address]

## 2020-01-01 ENCOUNTER — APPOINTMENT (OUTPATIENT)
Dept: GENERAL RADIOLOGY | Facility: CLINIC | Age: 60
End: 2020-01-01
Attending: SURGERY
Payer: COMMERCIAL

## 2020-01-01 ENCOUNTER — CARE COORDINATION (OUTPATIENT)
Dept: CARDIOLOGY | Facility: CLINIC | Age: 60
End: 2020-01-01

## 2020-01-01 ENCOUNTER — APPOINTMENT (OUTPATIENT)
Dept: GENERAL RADIOLOGY | Facility: CLINIC | Age: 60
DRG: 236 | End: 2020-01-01
Attending: STUDENT IN AN ORGANIZED HEALTH CARE EDUCATION/TRAINING PROGRAM
Payer: COMMERCIAL

## 2020-01-01 ENCOUNTER — ANESTHESIA (OUTPATIENT)
Dept: SURGERY | Facility: CLINIC | Age: 60
DRG: 236 | End: 2020-01-01
Payer: COMMERCIAL

## 2020-01-01 ENCOUNTER — ANESTHESIA (OUTPATIENT)
Dept: MEDSURG UNIT | Facility: CLINIC | Age: 60
DRG: 236 | End: 2020-01-01
Payer: COMMERCIAL

## 2020-01-01 ENCOUNTER — OFFICE VISIT (OUTPATIENT)
Dept: CARDIOLOGY | Facility: CLINIC | Age: 60
End: 2020-01-01
Attending: INTERNAL MEDICINE
Payer: COMMERCIAL

## 2020-01-01 ENCOUNTER — HOSPITAL ENCOUNTER (OUTPATIENT)
Dept: PHYSICAL THERAPY | Facility: CLINIC | Age: 60
Setting detail: THERAPIES SERIES
End: 2020-02-04
Attending: PSYCHIATRY & NEUROLOGY
Payer: COMMERCIAL

## 2020-01-01 ENCOUNTER — HOSPITAL ENCOUNTER (OUTPATIENT)
Dept: PHYSICAL THERAPY | Facility: CLINIC | Age: 60
Setting detail: THERAPIES SERIES
End: 2020-01-14
Attending: PSYCHIATRY & NEUROLOGY
Payer: COMMERCIAL

## 2020-01-01 ENCOUNTER — APPOINTMENT (OUTPATIENT)
Dept: GENERAL RADIOLOGY | Facility: CLINIC | Age: 60
DRG: 236 | End: 2020-01-01
Attending: NURSE PRACTITIONER
Payer: COMMERCIAL

## 2020-01-01 ENCOUNTER — HOSPITAL ENCOUNTER (INPATIENT)
Facility: CLINIC | Age: 60
LOS: 3 days | DRG: 236 | End: 2020-02-22
Attending: SURGERY | Admitting: SURGERY
Payer: COMMERCIAL

## 2020-01-01 ENCOUNTER — APPOINTMENT (OUTPATIENT)
Dept: PHYSICAL THERAPY | Facility: CLINIC | Age: 60
DRG: 236 | End: 2020-01-01
Attending: SURGERY
Payer: COMMERCIAL

## 2020-01-01 ENCOUNTER — APPOINTMENT (OUTPATIENT)
Dept: ULTRASOUND IMAGING | Facility: CLINIC | Age: 60
End: 2020-01-01
Attending: SURGERY
Payer: COMMERCIAL

## 2020-01-01 ENCOUNTER — APPOINTMENT (OUTPATIENT)
Dept: GENERAL RADIOLOGY | Facility: CLINIC | Age: 60
DRG: 236 | End: 2020-01-01
Attending: SURGERY
Payer: COMMERCIAL

## 2020-01-01 ENCOUNTER — ANESTHESIA EVENT (OUTPATIENT)
Dept: SURGERY | Facility: CLINIC | Age: 60
DRG: 236 | End: 2020-01-01
Payer: COMMERCIAL

## 2020-01-01 ENCOUNTER — ANESTHESIA EVENT (OUTPATIENT)
Dept: MEDSURG UNIT | Facility: CLINIC | Age: 60
DRG: 236 | End: 2020-01-01
Payer: COMMERCIAL

## 2020-01-01 ENCOUNTER — HOSPITAL ENCOUNTER (OUTPATIENT)
Dept: PHYSICAL THERAPY | Facility: CLINIC | Age: 60
Setting detail: THERAPIES SERIES
End: 2020-01-22
Attending: PSYCHIATRY & NEUROLOGY
Payer: COMMERCIAL

## 2020-01-01 ENCOUNTER — OFFICE VISIT (OUTPATIENT)
Dept: CARDIOLOGY | Facility: CLINIC | Age: 60
End: 2020-01-01
Payer: COMMERCIAL

## 2020-01-01 ENCOUNTER — HOSPITAL ENCOUNTER (OUTPATIENT)
Dept: PHYSICAL THERAPY | Facility: CLINIC | Age: 60
Setting detail: THERAPIES SERIES
End: 2020-01-28
Attending: PSYCHIATRY & NEUROLOGY
Payer: COMMERCIAL

## 2020-01-01 ENCOUNTER — TELEPHONE (OUTPATIENT)
Dept: CARDIOLOGY | Facility: CLINIC | Age: 60
End: 2020-01-01

## 2020-01-01 ENCOUNTER — PREP FOR PROCEDURE (OUTPATIENT)
Dept: CARDIOLOGY | Facility: CLINIC | Age: 60
End: 2020-01-01

## 2020-01-01 ENCOUNTER — HOSPITAL ENCOUNTER (OUTPATIENT)
Facility: CLINIC | Age: 60
Discharge: HOME | End: 2020-01-24
Admitting: INTERNAL MEDICINE
Payer: COMMERCIAL

## 2020-01-01 ENCOUNTER — DOCUMENTATION ONLY (OUTPATIENT)
Dept: OTHER | Facility: CLINIC | Age: 60
End: 2020-01-01

## 2020-01-01 ENCOUNTER — APPOINTMENT (OUTPATIENT)
Dept: PHYSICAL THERAPY | Facility: CLINIC | Age: 60
DRG: 236 | End: 2020-01-01
Attending: STUDENT IN AN ORGANIZED HEALTH CARE EDUCATION/TRAINING PROGRAM
Payer: COMMERCIAL

## 2020-01-01 ENCOUNTER — HOSPITAL ENCOUNTER (OUTPATIENT)
Dept: LAB | Facility: CLINIC | Age: 60
Discharge: HOME | End: 2020-02-03
Attending: SURGERY | Admitting: SURGERY
Payer: COMMERCIAL

## 2020-01-01 VITALS
DIASTOLIC BLOOD PRESSURE: 91 MMHG | SYSTOLIC BLOOD PRESSURE: 152 MMHG | HEART RATE: 85 BPM | HEIGHT: 71 IN | WEIGHT: 218 LBS | BODY MASS INDEX: 30.52 KG/M2

## 2020-01-01 VITALS
SYSTOLIC BLOOD PRESSURE: 140 MMHG | WEIGHT: 233.47 LBS | TEMPERATURE: 99.2 F | OXYGEN SATURATION: 93 % | HEART RATE: 99 BPM | BODY MASS INDEX: 32.58 KG/M2 | RESPIRATION RATE: 18 BRPM | DIASTOLIC BLOOD PRESSURE: 78 MMHG

## 2020-01-01 VITALS
HEART RATE: 82 BPM | DIASTOLIC BLOOD PRESSURE: 80 MMHG | WEIGHT: 211.1 LBS | BODY MASS INDEX: 29.55 KG/M2 | SYSTOLIC BLOOD PRESSURE: 130 MMHG | OXYGEN SATURATION: 94 % | HEIGHT: 71 IN

## 2020-01-01 VITALS
BODY MASS INDEX: 29.4 KG/M2 | DIASTOLIC BLOOD PRESSURE: 79 MMHG | OXYGEN SATURATION: 94 % | TEMPERATURE: 98.2 F | SYSTOLIC BLOOD PRESSURE: 135 MMHG | WEIGHT: 210 LBS | HEART RATE: 79 BPM | RESPIRATION RATE: 16 BRPM | HEIGHT: 71 IN

## 2020-01-01 VITALS
BODY MASS INDEX: 29.99 KG/M2 | DIASTOLIC BLOOD PRESSURE: 82 MMHG | HEART RATE: 82 BPM | WEIGHT: 214.2 LBS | OXYGEN SATURATION: 97 % | HEIGHT: 71 IN | SYSTOLIC BLOOD PRESSURE: 138 MMHG

## 2020-01-01 DIAGNOSIS — I50.31 ACUTE HEART FAILURE WITH PRESERVED EJECTION FRACTION (H): ICD-10-CM

## 2020-01-01 DIAGNOSIS — E78.5 HYPERLIPIDEMIA WITH TARGET LDL LESS THAN 100: Primary | ICD-10-CM

## 2020-01-01 DIAGNOSIS — I25.118 CORONARY ARTERY DISEASE OF NATIVE ARTERY OF NATIVE HEART WITH STABLE ANGINA PECTORIS (H): Primary | ICD-10-CM

## 2020-01-01 DIAGNOSIS — I25.118 CORONARY ARTERY DISEASE OF NATIVE HEART WITH STABLE ANGINA PECTORIS, UNSPECIFIED VESSEL OR LESION TYPE (H): Primary | ICD-10-CM

## 2020-01-01 DIAGNOSIS — I25.10 CAD (CORONARY ARTERY DISEASE): ICD-10-CM

## 2020-01-01 DIAGNOSIS — I25.10 CAD (CORONARY ARTERY DISEASE): Primary | ICD-10-CM

## 2020-01-01 DIAGNOSIS — I25.118 CORONARY ARTERY DISEASE OF NATIVE HEART WITH STABLE ANGINA PECTORIS, UNSPECIFIED VESSEL OR LESION TYPE (H): ICD-10-CM

## 2020-01-01 DIAGNOSIS — I25.10 CORONARY ARTERY DISEASE INVOLVING NATIVE CORONARY ARTERY OF NATIVE HEART, ANGINA PRESENCE UNSPECIFIED: Primary | ICD-10-CM

## 2020-01-01 DIAGNOSIS — E11.9 TYPE 2 DIABETES, HBA1C GOAL < 7% (H): Primary | Chronic | ICD-10-CM

## 2020-01-01 LAB
ABO + RH BLD: NORMAL
ALBUMIN SERPL-MCNC: 2.3 G/DL (ref 3.4–5)
ALBUMIN SERPL-MCNC: 2.4 G/DL (ref 3.4–5)
ALBUMIN SERPL-MCNC: 3 G/DL (ref 3.4–5)
ALBUMIN SERPL-MCNC: 3.2 G/DL (ref 3.4–5)
ALBUMIN UR-MCNC: 10 MG/DL
ALP SERPL-CCNC: 41 U/L (ref 40–150)
ALP SERPL-CCNC: 44 U/L (ref 40–150)
ALP SERPL-CCNC: 75 U/L (ref 40–150)
ALP SERPL-CCNC: 87 U/L (ref 40–150)
ALT SERPL W P-5'-P-CCNC: 22 U/L (ref 0–70)
ALT SERPL W P-5'-P-CCNC: 22 U/L (ref 0–70)
ALT SERPL W P-5'-P-CCNC: 34 U/L (ref 0–70)
ALT SERPL W P-5'-P-CCNC: 45 U/L (ref 0–70)
ANION GAP SERPL CALCULATED.3IONS-SCNC: 10.5 MMOL/L (ref 6–17)
ANION GAP SERPL CALCULATED.3IONS-SCNC: 11.3 MMOL/L (ref 6–17)
ANION GAP SERPL CALCULATED.3IONS-SCNC: 13 MMOL/L (ref 3–14)
ANION GAP SERPL CALCULATED.3IONS-SCNC: 3 MMOL/L (ref 3–14)
ANION GAP SERPL CALCULATED.3IONS-SCNC: 4 MMOL/L (ref 3–14)
ANION GAP SERPL CALCULATED.3IONS-SCNC: 5 MMOL/L (ref 3–14)
ANION GAP SERPL CALCULATED.3IONS-SCNC: 6 MMOL/L (ref 3–14)
ANION GAP SERPL CALCULATED.3IONS-SCNC: 6 MMOL/L (ref 3–14)
ANION GAP SERPL CALCULATED.3IONS-SCNC: 9.6 MMOL/L (ref 6–17)
APPEARANCE UR: CLEAR
APTT PPP: 25 SEC (ref 22–37)
APTT PPP: 39 SEC (ref 22–37)
APTT PPP: 42 SEC (ref 22–37)
AST SERPL W P-5'-P-CCNC: 13 U/L (ref 0–45)
AST SERPL W P-5'-P-CCNC: 24 U/L (ref 0–45)
AST SERPL W P-5'-P-CCNC: 60 U/L (ref 0–45)
AST SERPL W P-5'-P-CCNC: 75 U/L (ref 0–45)
BASE DEFICIT BLDA-SCNC: 1.1 MMOL/L
BASE DEFICIT BLDA-SCNC: 3.3 MMOL/L
BASE DEFICIT BLDV-SCNC: 16 MMOL/L
BASE EXCESS BLDA CALC-SCNC: 0.3 MMOL/L
BASE EXCESS BLDA CALC-SCNC: 0.9 MMOL/L
BASE EXCESS BLDA CALC-SCNC: 1.5 MMOL/L
BASE EXCESS BLDA CALC-SCNC: 1.7 MMOL/L
BASE EXCESS BLDV CALC-SCNC: 2.6 MMOL/L
BILIRUB DIRECT SERPL-MCNC: <0.1 MG/DL (ref 0–0.2)
BILIRUB SERPL-MCNC: 0.3 MG/DL (ref 0.2–1.3)
BILIRUB SERPL-MCNC: 0.5 MG/DL (ref 0.2–1.3)
BILIRUB SERPL-MCNC: 0.5 MG/DL (ref 0.2–1.3)
BILIRUB SERPL-MCNC: 0.6 MG/DL (ref 0.2–1.3)
BILIRUB UR QL STRIP: NEGATIVE
BLD GP AB SCN SERPL QL: NORMAL
BLD GP AB SCN SERPL QL: NORMAL
BLD PROD TYP BPU: NORMAL
BLD UNIT ID BPU: 0
BLOOD BANK CMNT PATIENT-IMP: NORMAL
BLOOD PRODUCT CODE: NORMAL
BPU ID: NORMAL
BUN SERPL-MCNC: 28 MG/DL (ref 7–30)
BUN SERPL-MCNC: 30 MG/DL (ref 7–30)
BUN SERPL-MCNC: 33 MG/DL (ref 7–30)
BUN SERPL-MCNC: 34 MG/DL (ref 7–30)
BUN SERPL-MCNC: 39 MG/DL (ref 7–30)
BUN SERPL-MCNC: 39 MG/DL (ref 7–30)
BUN SERPL-MCNC: 45 MG/DL (ref 7–30)
BUN SERPL-MCNC: 57 MG/DL (ref 7–30)
BUN SERPL-MCNC: 59 MG/DL (ref 7–30)
CA-I BLD-MCNC: 3.9 MG/DL (ref 4.4–5.2)
CA-I BLD-MCNC: 4.1 MG/DL (ref 4.4–5.2)
CA-I BLD-MCNC: 4.4 MG/DL (ref 4.4–5.2)
CA-I BLD-MCNC: 4.6 MG/DL (ref 4.4–5.2)
CA-I BLD-MCNC: 4.8 MG/DL (ref 4.4–5.2)
CA-I BLD-MCNC: 4.8 MG/DL (ref 4.4–5.2)
CA-I BLD-MCNC: 4.9 MG/DL (ref 4.4–5.2)
CA-I SERPL ISE-MCNC: 5.7 MG/DL (ref 4.4–5.2)
CALCIUM SERPL-MCNC: 7.8 MG/DL (ref 8.5–10.1)
CALCIUM SERPL-MCNC: 7.8 MG/DL (ref 8.5–10.1)
CALCIUM SERPL-MCNC: 8.1 MG/DL (ref 8.5–10.1)
CALCIUM SERPL-MCNC: 8.6 MG/DL (ref 8.5–10.1)
CALCIUM SERPL-MCNC: 8.6 MG/DL (ref 8.5–10.1)
CALCIUM SERPL-MCNC: 9.2 MG/DL (ref 8.5–10.5)
CALCIUM SERPL-MCNC: 9.3 MG/DL (ref 8.5–10.5)
CALCIUM SERPL-MCNC: 9.3 MG/DL (ref 8.5–10.5)
CALCIUM SERPL-MCNC: 9.7 MG/DL (ref 8.5–10.1)
CHLORIDE SERPL-SCNC: 102 MMOL/L (ref 98–107)
CHLORIDE SERPL-SCNC: 103 MMOL/L (ref 98–107)
CHLORIDE SERPL-SCNC: 108 MMOL/L (ref 94–109)
CHLORIDE SERPL-SCNC: 108 MMOL/L (ref 94–109)
CHLORIDE SERPL-SCNC: 109 MMOL/L (ref 94–109)
CHLORIDE SERPL-SCNC: 110 MMOL/L (ref 94–109)
CHLORIDE SERPL-SCNC: 113 MMOL/L (ref 94–109)
CHLORIDE SERPL-SCNC: 98 MMOL/L (ref 94–109)
CHLORIDE SERPL-SCNC: 98 MMOL/L (ref 98–107)
CO2 BLDCOV-SCNC: 19 MMOL/L (ref 21–28)
CO2 BLDCOV-SCNC: 31 MMOL/L (ref 21–28)
CO2 SERPL-SCNC: 17 MMOL/L (ref 20–32)
CO2 SERPL-SCNC: 24 MMOL/L (ref 20–32)
CO2 SERPL-SCNC: 25 MMOL/L (ref 20–32)
CO2 SERPL-SCNC: 26 MMOL/L (ref 20–32)
CO2 SERPL-SCNC: 28 MMOL/L (ref 20–32)
CO2 SERPL-SCNC: 29 MMOL/L (ref 23–29)
CO2 SERPL-SCNC: 30 MMOL/L (ref 20–32)
CO2 SERPL-SCNC: 31 MMOL/L (ref 23–29)
CO2 SERPL-SCNC: 32 MMOL/L (ref 23–29)
COLOR UR AUTO: YELLOW
CREAT SERPL-MCNC: 1.12 MG/DL (ref 0.66–1.25)
CREAT SERPL-MCNC: 1.14 MG/DL (ref 0.66–1.25)
CREAT SERPL-MCNC: 1.31 MG/DL (ref 0.7–1.3)
CREAT SERPL-MCNC: 1.34 MG/DL (ref 0.7–1.3)
CREAT SERPL-MCNC: 1.44 MG/DL (ref 0.66–1.25)
CREAT SERPL-MCNC: 1.53 MG/DL (ref 0.66–1.25)
CREAT SERPL-MCNC: 1.68 MG/DL (ref 0.66–1.25)
CREAT SERPL-MCNC: 1.82 MG/DL (ref 0.66–1.25)
CREAT SERPL-MCNC: 1.91 MG/DL (ref 0.7–1.3)
ERYTHROCYTE [DISTWIDTH] IN BLOOD BY AUTOMATED COUNT: 13 % (ref 10–15)
ERYTHROCYTE [DISTWIDTH] IN BLOOD BY AUTOMATED COUNT: 13.4 % (ref 10–15)
ERYTHROCYTE [DISTWIDTH] IN BLOOD BY AUTOMATED COUNT: 13.5 % (ref 10–15)
ERYTHROCYTE [DISTWIDTH] IN BLOOD BY AUTOMATED COUNT: 13.8 % (ref 10–15)
ERYTHROCYTE [DISTWIDTH] IN BLOOD BY AUTOMATED COUNT: 13.9 % (ref 10–15)
ERYTHROCYTE [DISTWIDTH] IN BLOOD BY AUTOMATED COUNT: 14 % (ref 10–15)
FIBRINOGEN PPP-MCNC: 348 MG/DL (ref 200–420)
GFR SERPL CREATININE-BSD FRML MDRD: 36 ML/MIN/{1.73_M2}
GFR SERPL CREATININE-BSD FRML MDRD: 40 ML/MIN/{1.73_M2}
GFR SERPL CREATININE-BSD FRML MDRD: 44 ML/MIN/{1.73_M2}
GFR SERPL CREATININE-BSD FRML MDRD: 49 ML/MIN/{1.73_M2}
GFR SERPL CREATININE-BSD FRML MDRD: 53 ML/MIN/{1.73_M2}
GFR SERPL CREATININE-BSD FRML MDRD: 55 ML/MIN/{1.73_M2}
GFR SERPL CREATININE-BSD FRML MDRD: 56 ML/MIN/{1.73_M2}
GFR SERPL CREATININE-BSD FRML MDRD: 70 ML/MIN/{1.73_M2}
GFR SERPL CREATININE-BSD FRML MDRD: 72 ML/MIN/{1.73_M2}
GLUCOSE BLDC GLUCOMTR-MCNC: 110 MG/DL (ref 70–99)
GLUCOSE BLDC GLUCOMTR-MCNC: 114 MG/DL (ref 70–99)
GLUCOSE BLDC GLUCOMTR-MCNC: 117 MG/DL (ref 70–99)
GLUCOSE BLDC GLUCOMTR-MCNC: 117 MG/DL (ref 70–99)
GLUCOSE BLDC GLUCOMTR-MCNC: 118 MG/DL (ref 70–99)
GLUCOSE BLDC GLUCOMTR-MCNC: 119 MG/DL (ref 70–99)
GLUCOSE BLDC GLUCOMTR-MCNC: 120 MG/DL (ref 70–99)
GLUCOSE BLDC GLUCOMTR-MCNC: 123 MG/DL (ref 70–99)
GLUCOSE BLDC GLUCOMTR-MCNC: 127 MG/DL (ref 70–99)
GLUCOSE BLDC GLUCOMTR-MCNC: 129 MG/DL (ref 70–99)
GLUCOSE BLDC GLUCOMTR-MCNC: 129 MG/DL (ref 70–99)
GLUCOSE BLDC GLUCOMTR-MCNC: 133 MG/DL (ref 70–99)
GLUCOSE BLDC GLUCOMTR-MCNC: 133 MG/DL (ref 70–99)
GLUCOSE BLDC GLUCOMTR-MCNC: 134 MG/DL (ref 70–99)
GLUCOSE BLDC GLUCOMTR-MCNC: 136 MG/DL (ref 70–99)
GLUCOSE BLDC GLUCOMTR-MCNC: 137 MG/DL (ref 70–99)
GLUCOSE BLDC GLUCOMTR-MCNC: 138 MG/DL (ref 70–99)
GLUCOSE BLDC GLUCOMTR-MCNC: 139 MG/DL (ref 70–99)
GLUCOSE BLDC GLUCOMTR-MCNC: 146 MG/DL (ref 70–99)
GLUCOSE BLDC GLUCOMTR-MCNC: 147 MG/DL (ref 70–99)
GLUCOSE BLDC GLUCOMTR-MCNC: 148 MG/DL (ref 70–99)
GLUCOSE BLDC GLUCOMTR-MCNC: 154 MG/DL (ref 70–99)
GLUCOSE BLDC GLUCOMTR-MCNC: 157 MG/DL (ref 70–99)
GLUCOSE BLDC GLUCOMTR-MCNC: 159 MG/DL (ref 70–99)
GLUCOSE BLDC GLUCOMTR-MCNC: 163 MG/DL (ref 70–99)
GLUCOSE BLDC GLUCOMTR-MCNC: 172 MG/DL (ref 70–99)
GLUCOSE BLDC GLUCOMTR-MCNC: 174 MG/DL (ref 70–99)
GLUCOSE BLDC GLUCOMTR-MCNC: 174 MG/DL (ref 70–99)
GLUCOSE BLDC GLUCOMTR-MCNC: 238 MG/DL (ref 70–99)
GLUCOSE BLDC GLUCOMTR-MCNC: 252 MG/DL (ref 70–99)
GLUCOSE BLDC GLUCOMTR-MCNC: 259 MG/DL (ref 70–99)
GLUCOSE BLDC GLUCOMTR-MCNC: 287 MG/DL (ref 70–99)
GLUCOSE BLDC GLUCOMTR-MCNC: 338 MG/DL (ref 70–99)
GLUCOSE SERPL-MCNC: 103 MG/DL (ref 70–99)
GLUCOSE SERPL-MCNC: 130 MG/DL (ref 70–99)
GLUCOSE SERPL-MCNC: 147 MG/DL (ref 70–99)
GLUCOSE SERPL-MCNC: 151 MG/DL (ref 70–99)
GLUCOSE SERPL-MCNC: 177 MG/DL (ref 70–99)
GLUCOSE SERPL-MCNC: 293 MG/DL (ref 70–105)
GLUCOSE SERPL-MCNC: 303 MG/DL (ref 70–105)
GLUCOSE SERPL-MCNC: 314 MG/DL (ref 70–105)
GLUCOSE SERPL-MCNC: 329 MG/DL (ref 70–99)
GLUCOSE SERPL-MCNC: 425 MG/DL (ref 70–99)
GLUCOSE UR STRIP-MCNC: NEGATIVE MG/DL
HBA1C MFR BLD: 11.8 % (ref 0–5.6)
HCO3 BLD-SCNC: 23 MMOL/L (ref 21–28)
HCO3 BLD-SCNC: 24 MMOL/L (ref 21–28)
HCO3 BLD-SCNC: 26 MMOL/L (ref 21–28)
HCO3 BLD-SCNC: 26 MMOL/L (ref 21–28)
HCO3 BLD-SCNC: 27 MMOL/L (ref 21–28)
HCO3 BLD-SCNC: 28 MMOL/L (ref 21–28)
HCO3 BLDV-SCNC: 17 MMOL/L (ref 21–28)
HCO3 BLDV-SCNC: 29 MMOL/L (ref 21–28)
HCT VFR BLD AUTO: 28.3 % (ref 40–53)
HCT VFR BLD AUTO: 28.3 % (ref 40–53)
HCT VFR BLD AUTO: 29 % (ref 40–53)
HCT VFR BLD AUTO: 29.8 % (ref 40–53)
HCT VFR BLD AUTO: 30.7 % (ref 40–53)
HCT VFR BLD AUTO: 40.9 % (ref 40–53)
HCT VFR BLD CALC: 28 %PCV (ref 40–53)
HGB BLD CALC-MCNC: 9.5 G/DL (ref 13.3–17.7)
HGB BLD-MCNC: 10.3 G/DL (ref 13.3–17.7)
HGB BLD-MCNC: 13.7 G/DL (ref 13.3–17.7)
HGB BLD-MCNC: 8.9 G/DL (ref 13.3–17.7)
HGB BLD-MCNC: 9.1 G/DL (ref 13.3–17.7)
HGB BLD-MCNC: 9.5 G/DL (ref 13.3–17.7)
HGB BLD-MCNC: 9.6 G/DL (ref 13.3–17.7)
HGB UR QL STRIP: NEGATIVE
HYALINE CASTS #/AREA URNS LPF: 9 /LPF (ref 0–2)
INR PPP: 0.96 (ref 0.86–1.14)
INR PPP: 1.15 (ref 0.86–1.14)
INR PPP: 1.27 (ref 0.86–1.14)
INR PPP: 1.51 (ref 0.86–1.14)
INTERPRETATION ECG - MUSE: NORMAL
INTERPRETATION ECG - MUSE: NORMAL
KETONES UR STRIP-MCNC: NEGATIVE MG/DL
LACTATE BLD-SCNC: 0.4 MMOL/L (ref 0.7–2)
LACTATE BLD-SCNC: 0.5 MMOL/L (ref 0.7–2)
LACTATE BLD-SCNC: 0.5 MMOL/L (ref 0.7–2)
LACTATE BLD-SCNC: 0.7 MMOL/L (ref 0.7–2)
LACTATE BLD-SCNC: 0.8 MMOL/L (ref 0.7–2)
LACTATE BLD-SCNC: 0.8 MMOL/L (ref 0.7–2)
LACTATE BLD-SCNC: 8.7 MMOL/L (ref 0.7–2)
LEUKOCYTE ESTERASE UR QL STRIP: NEGATIVE
MAGNESIUM SERPL-MCNC: 2.8 MG/DL (ref 1.6–2.3)
MAGNESIUM SERPL-MCNC: 2.8 MG/DL (ref 1.6–2.3)
MAGNESIUM SERPL-MCNC: 3.3 MG/DL (ref 1.6–2.3)
MCH RBC QN AUTO: 30.4 PG (ref 26.5–33)
MCH RBC QN AUTO: 30.5 PG (ref 26.5–33)
MCH RBC QN AUTO: 30.6 PG (ref 26.5–33)
MCH RBC QN AUTO: 30.9 PG (ref 26.5–33)
MCHC RBC AUTO-ENTMCNC: 30.7 G/DL (ref 31.5–36.5)
MCHC RBC AUTO-ENTMCNC: 32.2 G/DL (ref 31.5–36.5)
MCHC RBC AUTO-ENTMCNC: 32.2 G/DL (ref 31.5–36.5)
MCHC RBC AUTO-ENTMCNC: 33.5 G/DL (ref 31.5–36.5)
MCHC RBC AUTO-ENTMCNC: 33.6 G/DL (ref 31.5–36.5)
MCHC RBC AUTO-ENTMCNC: 33.6 G/DL (ref 31.5–36.5)
MCV RBC AUTO: 101 FL (ref 78–100)
MCV RBC AUTO: 92 FL (ref 78–100)
MCV RBC AUTO: 94 FL (ref 78–100)
MCV RBC AUTO: 95 FL (ref 78–100)
MRSA DNA SPEC QL NAA+PROBE: NEGATIVE
MUCOUS THREADS #/AREA URNS LPF: PRESENT /LPF
NITRATE UR QL: NEGATIVE
NUM BPU REQUESTED: 4
O2/TOTAL GAS SETTING VFR VENT: 100 %
O2/TOTAL GAS SETTING VFR VENT: 45 %
O2/TOTAL GAS SETTING VFR VENT: 50 %
O2/TOTAL GAS SETTING VFR VENT: 70 %
O2/TOTAL GAS SETTING VFR VENT: 70 %
OXYHGB MFR BLD: 100 % (ref 92–100)
OXYHGB MFR BLD: 96 % (ref 92–100)
OXYHGB MFR BLD: 97 % (ref 92–100)
OXYHGB MFR BLD: 97 % (ref 92–100)
OXYHGB MFR BLDV: 57 %
OXYHGB MFR BLDV: 79 %
PCO2 BLD: 43 MM HG (ref 35–45)
PCO2 BLD: 44 MM HG (ref 35–45)
PCO2 BLD: 47 MM HG (ref 35–45)
PCO2 BLD: 49 MM HG (ref 35–45)
PCO2 BLDV: 50 MM HG (ref 40–50)
PCO2 BLDV: 56 MM HG (ref 40–50)
PCO2 BLDV: 84 MM HG (ref 40–50)
PCO2 BLDV: 97 MM HG (ref 40–50)
PH BLD: 7.3 PH (ref 7.35–7.45)
PH BLD: 7.36 PH (ref 7.35–7.45)
PH BLD: 7.36 PH (ref 7.35–7.45)
PH BLD: 7.38 PH (ref 7.35–7.45)
PH BLD: 7.39 PH (ref 7.35–7.45)
PH BLD: 7.4 PH (ref 7.35–7.45)
PH BLDV: 6.9 PH (ref 7.32–7.43)
PH BLDV: 6.91 PH (ref 7.32–7.43)
PH BLDV: 7.36 PH (ref 7.32–7.43)
PH BLDV: 7.36 PH (ref 7.32–7.43)
PH UR STRIP: 5 PH (ref 5–7)
PHOSPHATE SERPL-MCNC: 3.4 MG/DL (ref 2.5–4.5)
PHOSPHATE SERPL-MCNC: 4.2 MG/DL (ref 2.5–4.5)
PHOSPHATE SERPL-MCNC: 6.2 MG/DL (ref 2.5–4.5)
PLATELET # BLD AUTO: 115 10E9/L (ref 150–450)
PLATELET # BLD AUTO: 144 10E9/L (ref 150–450)
PLATELET # BLD AUTO: 154 10E9/L (ref 150–450)
PLATELET # BLD AUTO: 163 10E9/L (ref 150–450)
PLATELET # BLD AUTO: 181 10E9/L (ref 150–450)
PLATELET # BLD AUTO: 237 10E9/L (ref 150–450)
PO2 BLD: 123 MM HG (ref 80–105)
PO2 BLD: 181 MM HG (ref 80–105)
PO2 BLD: 291 MM HG (ref 80–105)
PO2 BLD: 303 MM HG (ref 80–105)
PO2 BLD: 82 MM HG (ref 80–105)
PO2 BLD: 83 MM HG (ref 80–105)
PO2 BLDV: 33 MM HG (ref 25–47)
PO2 BLDV: 39 MM HG (ref 25–47)
PO2 BLDV: 41 MM HG (ref 25–47)
PO2 BLDV: 44 MM HG (ref 25–47)
POTASSIUM BLD-SCNC: 6.3 MMOL/L (ref 3.4–5.3)
POTASSIUM SERPL-SCNC: 3.9 MMOL/L (ref 3.4–5.3)
POTASSIUM SERPL-SCNC: 4 MMOL/L (ref 3.4–5.3)
POTASSIUM SERPL-SCNC: 4.1 MMOL/L (ref 3.4–5.3)
POTASSIUM SERPL-SCNC: 4.1 MMOL/L (ref 3.4–5.3)
POTASSIUM SERPL-SCNC: 4.3 MMOL/L (ref 3.4–5.3)
POTASSIUM SERPL-SCNC: 4.3 MMOL/L (ref 3.4–5.3)
POTASSIUM SERPL-SCNC: 4.5 MMOL/L (ref 3.5–5.1)
POTASSIUM SERPL-SCNC: 4.6 MMOL/L (ref 3.5–5.1)
POTASSIUM SERPL-SCNC: 5.3 MMOL/L (ref 3.5–5.1)
POTASSIUM SERPL-SCNC: 5.4 MMOL/L (ref 3.4–5.3)
PROT SERPL-MCNC: 4.8 G/DL (ref 6.8–8.8)
PROT SERPL-MCNC: 5.7 G/DL (ref 6.8–8.8)
PROT SERPL-MCNC: 5.9 G/DL (ref 6.8–8.8)
PROT SERPL-MCNC: 7.1 G/DL (ref 6.8–8.8)
RBC # BLD AUTO: 2.88 10E12/L (ref 4.4–5.9)
RBC # BLD AUTO: 2.98 10E12/L (ref 4.4–5.9)
RBC # BLD AUTO: 3.07 10E12/L (ref 4.4–5.9)
RBC # BLD AUTO: 3.16 10E12/L (ref 4.4–5.9)
RBC # BLD AUTO: 3.33 10E12/L (ref 4.4–5.9)
RBC # BLD AUTO: 4.47 10E12/L (ref 4.4–5.9)
RBC #/AREA URNS AUTO: 0 /HPF (ref 0–2)
SAO2 % BLDV FROM PO2: 40 %
SAO2 % BLDV FROM PO2: 60 %
SODIUM BLD-SCNC: 138 MMOL/L (ref 133–144)
SODIUM SERPL-SCNC: 134 MMOL/L (ref 133–144)
SODIUM SERPL-SCNC: 136 MMOL/L (ref 133–144)
SODIUM SERPL-SCNC: 136 MMOL/L (ref 136–145)
SODIUM SERPL-SCNC: 138 MMOL/L (ref 133–144)
SODIUM SERPL-SCNC: 138 MMOL/L (ref 136–145)
SODIUM SERPL-SCNC: 138 MMOL/L (ref 136–145)
SODIUM SERPL-SCNC: 139 MMOL/L (ref 133–144)
SODIUM SERPL-SCNC: 142 MMOL/L (ref 133–144)
SODIUM SERPL-SCNC: 144 MMOL/L (ref 133–144)
SOURCE: ABNORMAL
SP GR UR STRIP: 1.01 (ref 1–1.03)
SPECIMEN EXP DATE BLD: NORMAL
SPECIMEN EXP DATE BLD: NORMAL
SPECIMEN SOURCE: NORMAL
TRANSFUSION STATUS PATIENT QL: NORMAL
UROBILINOGEN UR STRIP-MCNC: NORMAL MG/DL (ref 0–2)
WBC # BLD AUTO: 10.5 10E9/L (ref 4–11)
WBC # BLD AUTO: 10.9 10E9/L (ref 4–11)
WBC # BLD AUTO: 14.6 10E9/L (ref 4–11)
WBC # BLD AUTO: 19.3 10E9/L (ref 4–11)
WBC # BLD AUTO: 19.8 10E9/L (ref 4–11)
WBC # BLD AUTO: 19.9 10E9/L (ref 4–11)
WBC #/AREA URNS AUTO: <1 /HPF (ref 0–5)

## 2020-01-01 PROCEDURE — 25000565 ZZH ISOFLURANE, EA 15 MIN: Performed by: SURGERY

## 2020-01-01 PROCEDURE — 85730 THROMBOPLASTIN TIME PARTIAL: CPT | Performed by: STUDENT IN AN ORGANIZED HEALTH CARE EDUCATION/TRAINING PROGRAM

## 2020-01-01 PROCEDURE — 00000146 ZZHCL STATISTIC GLUCOSE BY METER IP

## 2020-01-01 PROCEDURE — 80048 BASIC METABOLIC PNL TOTAL CA: CPT

## 2020-01-01 PROCEDURE — 25000128 H RX IP 250 OP 636

## 2020-01-01 PROCEDURE — 82805 BLOOD GASES W/O2 SATURATION: CPT | Performed by: SURGERY

## 2020-01-01 PROCEDURE — 25000128 H RX IP 250 OP 636: Performed by: SURGERY

## 2020-01-01 PROCEDURE — P9047 ALBUMIN (HUMAN), 25%, 50ML: HCPCS | Performed by: PHYSICIAN ASSISTANT

## 2020-01-01 PROCEDURE — 36415 COLL VENOUS BLD VENIPUNCTURE: CPT | Performed by: SURGERY

## 2020-01-01 PROCEDURE — 25000131 ZZH RX MED GY IP 250 OP 636 PS 637: Performed by: SURGERY

## 2020-01-01 PROCEDURE — 40000671 ZZH STATISTIC ANESTHESIA CASE

## 2020-01-01 PROCEDURE — 25800030 ZZH RX IP 258 OP 636: Performed by: NURSE PRACTITIONER

## 2020-01-01 PROCEDURE — 20000003 ZZH R&B ICU

## 2020-01-01 PROCEDURE — 40000852 ZZH STATISTIC HEART CATH LAB OR EP LAB

## 2020-01-01 PROCEDURE — 86850 RBC ANTIBODY SCREEN: CPT | Performed by: SURGERY

## 2020-01-01 PROCEDURE — 93005 ELECTROCARDIOGRAM TRACING: CPT

## 2020-01-01 PROCEDURE — 97530 THERAPEUTIC ACTIVITIES: CPT | Mod: GP | Performed by: PHYSICAL THERAPIST

## 2020-01-01 PROCEDURE — 85027 COMPLETE CBC AUTOMATED: CPT | Performed by: STUDENT IN AN ORGANIZED HEALTH CARE EDUCATION/TRAINING PROGRAM

## 2020-01-01 PROCEDURE — 25000128 H RX IP 250 OP 636: Performed by: PHYSICIAN ASSISTANT

## 2020-01-01 PROCEDURE — 80048 BASIC METABOLIC PNL TOTAL CA: CPT | Performed by: NURSE PRACTITIONER

## 2020-01-01 PROCEDURE — 36415 COLL VENOUS BLD VENIPUNCTURE: CPT

## 2020-01-01 PROCEDURE — 84132 ASSAY OF SERUM POTASSIUM: CPT

## 2020-01-01 PROCEDURE — 82947 ASSAY GLUCOSE BLOOD QUANT: CPT | Performed by: ANESTHESIOLOGY

## 2020-01-01 PROCEDURE — 25000132 ZZH RX MED GY IP 250 OP 250 PS 637: Performed by: SURGERY

## 2020-01-01 PROCEDURE — 94003 VENT MGMT INPAT SUBQ DAY: CPT

## 2020-01-01 PROCEDURE — P9041 ALBUMIN (HUMAN),5%, 50ML: HCPCS

## 2020-01-01 PROCEDURE — 82805 BLOOD GASES W/O2 SATURATION: CPT | Performed by: STUDENT IN AN ORGANIZED HEALTH CARE EDUCATION/TRAINING PROGRAM

## 2020-01-01 PROCEDURE — 82330 ASSAY OF CALCIUM: CPT | Performed by: SURGERY

## 2020-01-01 PROCEDURE — 97116 GAIT TRAINING THERAPY: CPT | Mod: GP | Performed by: PHYSICAL THERAPIST

## 2020-01-01 PROCEDURE — 25800030 ZZH RX IP 258 OP 636

## 2020-01-01 PROCEDURE — 37000008 ZZH ANESTHESIA TECHNICAL FEE, 1ST 30 MIN: Performed by: SURGERY

## 2020-01-01 PROCEDURE — 31500 INSERT EMERGENCY AIRWAY: CPT

## 2020-01-01 PROCEDURE — 97112 NEUROMUSCULAR REEDUCATION: CPT | Mod: GP | Performed by: PHYSICAL THERAPIST

## 2020-01-01 PROCEDURE — 93010 ELECTROCARDIOGRAM REPORT: CPT | Performed by: INTERNAL MEDICINE

## 2020-01-01 PROCEDURE — C1894 INTRO/SHEATH, NON-LASER: HCPCS | Performed by: INTERNAL MEDICINE

## 2020-01-01 PROCEDURE — 36415 COLL VENOUS BLD VENIPUNCTURE: CPT | Performed by: INTERNAL MEDICINE

## 2020-01-01 PROCEDURE — 80053 COMPREHEN METABOLIC PANEL: CPT | Performed by: STUDENT IN AN ORGANIZED HEALTH CARE EDUCATION/TRAINING PROGRAM

## 2020-01-01 PROCEDURE — 85027 COMPLETE CBC AUTOMATED: CPT | Performed by: SURGERY

## 2020-01-01 PROCEDURE — C9113 INJ PANTOPRAZOLE SODIUM, VIA: HCPCS | Performed by: STUDENT IN AN ORGANIZED HEALTH CARE EDUCATION/TRAINING PROGRAM

## 2020-01-01 PROCEDURE — 83605 ASSAY OF LACTIC ACID: CPT | Performed by: NURSE PRACTITIONER

## 2020-01-01 PROCEDURE — 25000125 ZZHC RX 250: Performed by: NURSE ANESTHETIST, CERTIFIED REGISTERED

## 2020-01-01 PROCEDURE — 37000009 ZZH ANESTHESIA TECHNICAL FEE, EACH ADDTL 15 MIN: Performed by: SURGERY

## 2020-01-01 PROCEDURE — 83036 HEMOGLOBIN GLYCOSYLATED A1C: CPT

## 2020-01-01 PROCEDURE — 36415 COLL VENOUS BLD VENIPUNCTURE: CPT | Performed by: ANESTHESIOLOGY

## 2020-01-01 PROCEDURE — 99152 MOD SED SAME PHYS/QHP 5/>YRS: CPT | Performed by: INTERNAL MEDICINE

## 2020-01-01 PROCEDURE — 86901 BLOOD TYPING SEROLOGIC RH(D): CPT | Performed by: SURGERY

## 2020-01-01 PROCEDURE — 80048 BASIC METABOLIC PNL TOTAL CA: CPT | Performed by: SURGERY

## 2020-01-01 PROCEDURE — 25800030 ZZH RX IP 258 OP 636: Performed by: ANESTHESIOLOGY

## 2020-01-01 PROCEDURE — C1779 LEAD, PMKR, TRANSVENOUS VDD: HCPCS | Performed by: SURGERY

## 2020-01-01 PROCEDURE — 82330 ASSAY OF CALCIUM: CPT | Performed by: STUDENT IN AN ORGANIZED HEALTH CARE EDUCATION/TRAINING PROGRAM

## 2020-01-01 PROCEDURE — 80053 COMPREHEN METABOLIC PANEL: CPT | Performed by: NURSE PRACTITIONER

## 2020-01-01 PROCEDURE — 36000075 ZZH SURGERY LEVEL 6 EA 15 ADDTL MIN: Performed by: SURGERY

## 2020-01-01 PROCEDURE — 40000275 ZZH STATISTIC RCP TIME EA 10 MIN

## 2020-01-01 PROCEDURE — 12000000 ZZH R&B MED SURG/OB

## 2020-01-01 PROCEDURE — 99231 SBSQ HOSP IP/OBS SF/LOW 25: CPT | Performed by: NURSE PRACTITIONER

## 2020-01-01 PROCEDURE — P9041 ALBUMIN (HUMAN),5%, 50ML: HCPCS | Performed by: STUDENT IN AN ORGANIZED HEALTH CARE EDUCATION/TRAINING PROGRAM

## 2020-01-01 PROCEDURE — 25000125 ZZHC RX 250: Performed by: STUDENT IN AN ORGANIZED HEALTH CARE EDUCATION/TRAINING PROGRAM

## 2020-01-01 PROCEDURE — 25000128 H RX IP 250 OP 636: Performed by: STUDENT IN AN ORGANIZED HEALTH CARE EDUCATION/TRAINING PROGRAM

## 2020-01-01 PROCEDURE — 36415 COLL VENOUS BLD VENIPUNCTURE: CPT | Performed by: NURSE PRACTITIONER

## 2020-01-01 PROCEDURE — 021009W BYPASS CORONARY ARTERY, ONE ARTERY FROM AORTA WITH AUTOLOGOUS VENOUS TISSUE, OPEN APPROACH: ICD-10-PCS | Performed by: SURGERY

## 2020-01-01 PROCEDURE — 83735 ASSAY OF MAGNESIUM: CPT | Performed by: STUDENT IN AN ORGANIZED HEALTH CARE EDUCATION/TRAINING PROGRAM

## 2020-01-01 PROCEDURE — 99153 MOD SED SAME PHYS/QHP EA: CPT | Performed by: INTERNAL MEDICINE

## 2020-01-01 PROCEDURE — 86900 BLOOD TYPING SEROLOGIC ABO: CPT | Performed by: SURGERY

## 2020-01-01 PROCEDURE — 25000132 ZZH RX MED GY IP 250 OP 250 PS 637: Performed by: STUDENT IN AN ORGANIZED HEALTH CARE EDUCATION/TRAINING PROGRAM

## 2020-01-01 PROCEDURE — 71045 X-RAY EXAM CHEST 1 VIEW: CPT

## 2020-01-01 PROCEDURE — 93460 R&L HRT ART/VENTRICLE ANGIO: CPT | Performed by: INTERNAL MEDICINE

## 2020-01-01 PROCEDURE — 83605 ASSAY OF LACTIC ACID: CPT | Performed by: STUDENT IN AN ORGANIZED HEALTH CARE EDUCATION/TRAINING PROGRAM

## 2020-01-01 PROCEDURE — 84100 ASSAY OF PHOSPHORUS: CPT | Performed by: NURSE PRACTITIONER

## 2020-01-01 PROCEDURE — 86923 COMPATIBILITY TEST ELECTRIC: CPT | Performed by: SURGERY

## 2020-01-01 PROCEDURE — 83735 ASSAY OF MAGNESIUM: CPT | Performed by: NURSE PRACTITIONER

## 2020-01-01 PROCEDURE — 85730 THROMBOPLASTIN TIME PARTIAL: CPT | Performed by: SURGERY

## 2020-01-01 PROCEDURE — 25800030 ZZH RX IP 258 OP 636: Performed by: NURSE ANESTHETIST, CERTIFIED REGISTERED

## 2020-01-01 PROCEDURE — 82803 BLOOD GASES ANY COMBINATION: CPT

## 2020-01-01 PROCEDURE — 99207 ZZC CONSULT E&M CHANGED TO INITIAL LEVEL: CPT | Performed by: NURSE PRACTITIONER

## 2020-01-01 PROCEDURE — 97162 PT EVAL MOD COMPLEX 30 MIN: CPT | Mod: GP | Performed by: PHYSICAL THERAPIST

## 2020-01-01 PROCEDURE — 84100 ASSAY OF PHOSPHORUS: CPT | Performed by: STUDENT IN AN ORGANIZED HEALTH CARE EDUCATION/TRAINING PROGRAM

## 2020-01-01 PROCEDURE — 25800030 ZZH RX IP 258 OP 636: Performed by: SURGERY

## 2020-01-01 PROCEDURE — 25000131 ZZH RX MED GY IP 250 OP 636 PS 637: Performed by: STUDENT IN AN ORGANIZED HEALTH CARE EDUCATION/TRAINING PROGRAM

## 2020-01-01 PROCEDURE — 87640 STAPH A DNA AMP PROBE: CPT | Performed by: SURGERY

## 2020-01-01 PROCEDURE — 84295 ASSAY OF SERUM SODIUM: CPT

## 2020-01-01 PROCEDURE — 85027 COMPLETE CBC AUTOMATED: CPT | Performed by: NURSE PRACTITIONER

## 2020-01-01 PROCEDURE — 02100Z9 BYPASS CORONARY ARTERY, ONE ARTERY FROM LEFT INTERNAL MAMMARY, OPEN APPROACH: ICD-10-PCS | Performed by: SURGERY

## 2020-01-01 PROCEDURE — 40000235 ZZH STATISTIC TELEMETRY

## 2020-01-01 PROCEDURE — 99215 OFFICE O/P EST HI 40 MIN: CPT | Performed by: PHYSICIAN ASSISTANT

## 2020-01-01 PROCEDURE — 87641 MR-STAPH DNA AMP PROBE: CPT | Performed by: SURGERY

## 2020-01-01 PROCEDURE — 25800030 ZZH RX IP 258 OP 636: Performed by: STUDENT IN AN ORGANIZED HEALTH CARE EDUCATION/TRAINING PROGRAM

## 2020-01-01 PROCEDURE — 27210794 ZZH OR GENERAL SUPPLY STERILE: Performed by: SURGERY

## 2020-01-01 PROCEDURE — 25000128 H RX IP 250 OP 636: Performed by: INTERNAL MEDICINE

## 2020-01-01 PROCEDURE — 25000132 ZZH RX MED GY IP 250 OP 250 PS 637: Performed by: PHYSICIAN ASSISTANT

## 2020-01-01 PROCEDURE — 84132 ASSAY OF SERUM POTASSIUM: CPT | Performed by: ANESTHESIOLOGY

## 2020-01-01 PROCEDURE — 25000301 ZZH OR RX SURGIFLO W/THROMBIN KIT 2ML 1991 OPNP: Performed by: SURGERY

## 2020-01-01 PROCEDURE — 80048 BASIC METABOLIC PNL TOTAL CA: CPT | Performed by: PHYSICIAN ASSISTANT

## 2020-01-01 PROCEDURE — 92950 HEART/LUNG RESUSCITATION CPR: CPT | Performed by: NURSE PRACTITIONER

## 2020-01-01 PROCEDURE — 25000125 ZZHC RX 250: Performed by: INTERNAL MEDICINE

## 2020-01-01 PROCEDURE — 82330 ASSAY OF CALCIUM: CPT | Performed by: NURSE PRACTITIONER

## 2020-01-01 PROCEDURE — 85610 PROTHROMBIN TIME: CPT

## 2020-01-01 PROCEDURE — 36000073 ZZH SURGERY LEVEL 6 1ST 30 MIN: Performed by: SURGERY

## 2020-01-01 PROCEDURE — 93971 EXTREMITY STUDY: CPT | Mod: LT

## 2020-01-01 PROCEDURE — 25000132 ZZH RX MED GY IP 250 OP 250 PS 637: Performed by: INTERNAL MEDICINE

## 2020-01-01 PROCEDURE — 85610 PROTHROMBIN TIME: CPT | Performed by: NURSE PRACTITIONER

## 2020-01-01 PROCEDURE — 85014 HEMATOCRIT: CPT

## 2020-01-01 PROCEDURE — 3E043XZ INTRODUCTION OF VASOPRESSOR INTO CENTRAL VEIN, PERCUTANEOUS APPROACH: ICD-10-PCS | Performed by: SURGERY

## 2020-01-01 PROCEDURE — 25800030 ZZH RX IP 258 OP 636: Performed by: INTERNAL MEDICINE

## 2020-01-01 PROCEDURE — 40000986 XR CHEST PORT 1 VW

## 2020-01-01 PROCEDURE — 25000125 ZZHC RX 250

## 2020-01-01 PROCEDURE — 25000125 ZZHC RX 250: Performed by: SURGERY

## 2020-01-01 PROCEDURE — 85610 PROTHROMBIN TIME: CPT | Performed by: STUDENT IN AN ORGANIZED HEALTH CARE EDUCATION/TRAINING PROGRAM

## 2020-01-01 PROCEDURE — 41000023 ZZH PERA-PERFUSION, SH ONLY,  EACH ADDTL 15 MIN: Performed by: SURGERY

## 2020-01-01 PROCEDURE — 25000131 ZZH RX MED GY IP 250 OP 636 PS 637: Performed by: NURSE PRACTITIONER

## 2020-01-01 PROCEDURE — 81001 URINALYSIS AUTO W/SCOPE: CPT | Performed by: SURGERY

## 2020-01-01 PROCEDURE — 5A1221Z PERFORMANCE OF CARDIAC OUTPUT, CONTINUOUS: ICD-10-PCS | Performed by: SURGERY

## 2020-01-01 PROCEDURE — 25000128 H RX IP 250 OP 636: Performed by: ANESTHESIOLOGY

## 2020-01-01 PROCEDURE — 99222 1ST HOSP IP/OBS MODERATE 55: CPT | Performed by: NURSE PRACTITIONER

## 2020-01-01 PROCEDURE — 36415 COLL VENOUS BLD VENIPUNCTURE: CPT | Performed by: PHYSICIAN ASSISTANT

## 2020-01-01 PROCEDURE — 97110 THERAPEUTIC EXERCISES: CPT | Mod: GP | Performed by: PHYSICAL THERAPIST

## 2020-01-01 PROCEDURE — 80048 BASIC METABOLIC PNL TOTAL CA: CPT | Performed by: INTERNAL MEDICINE

## 2020-01-01 PROCEDURE — 41000022 ZZH PER-PERFUSION, SH ONLY,  1ST 30 MIN: Performed by: SURGERY

## 2020-01-01 PROCEDURE — 83605 ASSAY OF LACTIC ACID: CPT | Performed by: SURGERY

## 2020-01-01 PROCEDURE — 85027 COMPLETE CBC AUTOMATED: CPT

## 2020-01-01 PROCEDURE — 06BQ4ZZ EXCISION OF LEFT SAPHENOUS VEIN, PERCUTANEOUS ENDOSCOPIC APPROACH: ICD-10-PCS | Performed by: SURGERY

## 2020-01-01 PROCEDURE — 25000131 ZZH RX MED GY IP 250 OP 636 PS 637: Performed by: INTERNAL MEDICINE

## 2020-01-01 PROCEDURE — 25000128 H RX IP 250 OP 636: Performed by: NURSE ANESTHETIST, CERTIFIED REGISTERED

## 2020-01-01 PROCEDURE — 40000065 ZZH STATISTIC EKG NON-CHARGEABLE

## 2020-01-01 PROCEDURE — 31500 INSERT EMERGENCY AIRWAY: CPT | Performed by: NURSE ANESTHETIST, CERTIFIED REGISTERED

## 2020-01-01 PROCEDURE — 40000171 ZZH STATISTIC PRE-PROCEDURE ASSESSMENT III: Performed by: SURGERY

## 2020-01-01 PROCEDURE — 25000125 ZZHC RX 250: Performed by: ANESTHESIOLOGY

## 2020-01-01 PROCEDURE — 71046 X-RAY EXAM CHEST 2 VIEWS: CPT

## 2020-01-01 PROCEDURE — 99221 1ST HOSP IP/OBS SF/LOW 40: CPT | Performed by: INTERNAL MEDICINE

## 2020-01-01 PROCEDURE — 82805 BLOOD GASES W/O2 SATURATION: CPT | Performed by: NURSE PRACTITIONER

## 2020-01-01 PROCEDURE — 80076 HEPATIC FUNCTION PANEL: CPT

## 2020-01-01 PROCEDURE — 85610 PROTHROMBIN TIME: CPT | Performed by: SURGERY

## 2020-01-01 PROCEDURE — 94002 VENT MGMT INPAT INIT DAY: CPT

## 2020-01-01 PROCEDURE — 82962 GLUCOSE BLOOD TEST: CPT | Mod: 91

## 2020-01-01 PROCEDURE — 85384 FIBRINOGEN ACTIVITY: CPT | Performed by: SURGERY

## 2020-01-01 PROCEDURE — 27210794 ZZH OR GENERAL SUPPLY STERILE: Performed by: INTERNAL MEDICINE

## 2020-01-01 PROCEDURE — 93880 EXTRACRANIAL BILAT STUDY: CPT

## 2020-01-01 PROCEDURE — 85730 THROMBOPLASTIN TIME PARTIAL: CPT

## 2020-01-01 DEVICE — LEAD PACER MYOCARDIAL BIPOLAR TEMPORARY 53CM 6495F: Type: IMPLANTABLE DEVICE | Status: FUNCTIONAL

## 2020-01-01 RX ORDER — ALBUMIN, HUMAN INJ 5% 5 %
SOLUTION INTRAVENOUS
Status: COMPLETED
Start: 2020-01-01 | End: 2020-01-01

## 2020-01-01 RX ORDER — ACETAMINOPHEN 325 MG/1
650 TABLET ORAL EVERY 4 HOURS PRN
Status: DISCONTINUED | OUTPATIENT
Start: 2020-01-01 | End: 2020-01-01 | Stop reason: HOSPADM

## 2020-01-01 RX ORDER — MUPIROCIN 20 MG/G
OINTMENT TOPICAL 2 TIMES DAILY
Status: CANCELLED | OUTPATIENT
Start: 2020-01-01 | End: 2020-01-01

## 2020-01-01 RX ORDER — NICOTINE POLACRILEX 4 MG
15-30 LOZENGE BUCCAL
Status: DISCONTINUED | OUTPATIENT
Start: 2020-01-01 | End: 2020-01-01 | Stop reason: HOSPADM

## 2020-01-01 RX ORDER — POTASSIUM CL/LIDO/0.9 % NACL 10MEQ/0.1L
10 INTRAVENOUS SOLUTION, PIGGYBACK (ML) INTRAVENOUS
Status: DISCONTINUED | OUTPATIENT
Start: 2020-01-01 | End: 2020-01-01 | Stop reason: HOSPADM

## 2020-01-01 RX ORDER — LIDOCAINE 40 MG/G
CREAM TOPICAL
Status: DISCONTINUED | OUTPATIENT
Start: 2020-01-01 | End: 2020-01-01 | Stop reason: HOSPADM

## 2020-01-01 RX ORDER — PAPAVERINE HYDROCHLORIDE 30 MG/ML
INJECTION INTRAMUSCULAR; INTRAVENOUS PRN
Status: DISCONTINUED | OUTPATIENT
Start: 2020-01-01 | End: 2020-01-01 | Stop reason: HOSPADM

## 2020-01-01 RX ORDER — NITROGLYCERIN 20 MG/100ML
.07-2 INJECTION INTRAVENOUS CONTINUOUS PRN
Status: DISCONTINUED | OUTPATIENT
Start: 2020-01-01 | End: 2020-01-01 | Stop reason: HOSPADM

## 2020-01-01 RX ORDER — NALOXONE HYDROCHLORIDE 0.4 MG/ML
.1-.4 INJECTION, SOLUTION INTRAMUSCULAR; INTRAVENOUS; SUBCUTANEOUS
Status: DISCONTINUED | OUTPATIENT
Start: 2020-01-01 | End: 2020-01-01 | Stop reason: HOSPADM

## 2020-01-01 RX ORDER — HYDRALAZINE HYDROCHLORIDE 25 MG/1
25 TABLET, FILM COATED ORAL 3 TIMES DAILY
Qty: 90 TABLET | Refills: 5 | Status: ON HOLD | OUTPATIENT
Start: 2020-01-01 | End: 2020-01-01

## 2020-01-01 RX ORDER — METOPROLOL TARTRATE 25 MG/1
25 TABLET, FILM COATED ORAL
Status: CANCELLED | OUTPATIENT
Start: 2020-01-01

## 2020-01-01 RX ORDER — SODIUM CHLORIDE 9 MG/ML
INJECTION, SOLUTION INTRAVENOUS CONTINUOUS PRN
Status: DISCONTINUED | OUTPATIENT
Start: 2020-01-01 | End: 2020-01-01

## 2020-01-01 RX ORDER — POTASSIUM CHLORIDE 1500 MG/1
20 TABLET, EXTENDED RELEASE ORAL
Status: DISCONTINUED | OUTPATIENT
Start: 2020-01-01 | End: 2020-01-01 | Stop reason: HOSPADM

## 2020-01-01 RX ORDER — LIDOCAINE 40 MG/G
CREAM TOPICAL
Status: CANCELLED | OUTPATIENT
Start: 2020-01-01

## 2020-01-01 RX ORDER — BISACODYL 10 MG
10 SUPPOSITORY, RECTAL RECTAL DAILY PRN
Status: DISCONTINUED | OUTPATIENT
Start: 2020-01-01 | End: 2020-01-01 | Stop reason: HOSPADM

## 2020-01-01 RX ORDER — ACETAMINOPHEN 325 MG/1
975 TABLET ORAL EVERY 8 HOURS
Status: DISCONTINUED | OUTPATIENT
Start: 2020-01-01 | End: 2020-01-01 | Stop reason: HOSPADM

## 2020-01-01 RX ORDER — POTASSIUM CHLORIDE 1500 MG/1
20-40 TABLET, EXTENDED RELEASE ORAL
Status: DISCONTINUED | OUTPATIENT
Start: 2020-01-01 | End: 2020-01-01 | Stop reason: HOSPADM

## 2020-01-01 RX ORDER — MAGNESIUM HYDROXIDE 1200 MG/15ML
LIQUID ORAL PRN
Status: DISCONTINUED | OUTPATIENT
Start: 2020-01-01 | End: 2020-01-01 | Stop reason: HOSPADM

## 2020-01-01 RX ORDER — POTASSIUM CHLORIDE 1.5 G/1.58G
20-40 POWDER, FOR SOLUTION ORAL
Status: DISCONTINUED | OUTPATIENT
Start: 2020-01-01 | End: 2020-01-01 | Stop reason: HOSPADM

## 2020-01-01 RX ORDER — TORSEMIDE 20 MG/1
20 TABLET ORAL DAILY
Qty: 60 TABLET | Refills: 5 | COMMUNITY
Start: 2020-01-01

## 2020-01-01 RX ORDER — CEFAZOLIN SODIUM 2 G/100ML
2 INJECTION, SOLUTION INTRAVENOUS
Status: CANCELLED | OUTPATIENT
Start: 2020-01-01

## 2020-01-01 RX ORDER — CEFAZOLIN SODIUM 1 G/3ML
1 INJECTION, POWDER, FOR SOLUTION INTRAMUSCULAR; INTRAVENOUS SEE ADMIN INSTRUCTIONS
Status: CANCELLED | OUTPATIENT
Start: 2020-01-01

## 2020-01-01 RX ORDER — HYDROMORPHONE HYDROCHLORIDE 1 MG/ML
.3-.5 INJECTION, SOLUTION INTRAMUSCULAR; INTRAVENOUS; SUBCUTANEOUS
Status: DISCONTINUED | OUTPATIENT
Start: 2020-01-01 | End: 2020-01-01

## 2020-01-01 RX ORDER — PROTAMINE SULFATE 10 MG/ML
INJECTION, SOLUTION INTRAVENOUS PRN
Status: DISCONTINUED | OUTPATIENT
Start: 2020-01-01 | End: 2020-01-01

## 2020-01-01 RX ORDER — CYCLOBENZAPRINE HCL 10 MG
10 TABLET ORAL 3 TIMES DAILY PRN
Status: DISCONTINUED | OUTPATIENT
Start: 2020-01-01 | End: 2020-01-01

## 2020-01-01 RX ORDER — POTASSIUM CHLORIDE 1500 MG/1
20 TABLET, EXTENDED RELEASE ORAL
Status: CANCELLED | OUTPATIENT
Start: 2020-01-01

## 2020-01-01 RX ORDER — PANTOPRAZOLE SODIUM 40 MG/1
40 TABLET, DELAYED RELEASE ORAL
Status: DISCONTINUED | OUTPATIENT
Start: 2020-01-01 | End: 2020-01-01

## 2020-01-01 RX ORDER — SODIUM CHLORIDE, SODIUM LACTATE, POTASSIUM CHLORIDE, CALCIUM CHLORIDE 600; 310; 30; 20 MG/100ML; MG/100ML; MG/100ML; MG/100ML
INJECTION, SOLUTION INTRAVENOUS CONTINUOUS PRN
Status: DISCONTINUED | OUTPATIENT
Start: 2020-01-01 | End: 2020-01-01

## 2020-01-01 RX ORDER — ALBUMIN, HUMAN INJ 5% 5 %
500-1000 SOLUTION INTRAVENOUS
Status: COMPLETED | OUTPATIENT
Start: 2020-01-01 | End: 2020-01-01

## 2020-01-01 RX ORDER — SODIUM CHLORIDE, SODIUM LACTATE, POTASSIUM CHLORIDE, CALCIUM CHLORIDE 600; 310; 30; 20 MG/100ML; MG/100ML; MG/100ML; MG/100ML
INJECTION, SOLUTION INTRAVENOUS CONTINUOUS
Status: DISCONTINUED | OUTPATIENT
Start: 2020-01-01 | End: 2020-01-01 | Stop reason: HOSPADM

## 2020-01-01 RX ORDER — NITROGLYCERIN 20 MG/100ML
.07-2 INJECTION INTRAVENOUS CONTINUOUS
Status: DISCONTINUED | OUTPATIENT
Start: 2020-01-01 | End: 2020-01-01

## 2020-01-01 RX ORDER — INSULIN GLARGINE 100 [IU]/ML
50 INJECTION, SOLUTION SUBCUTANEOUS AT BEDTIME
COMMUNITY

## 2020-01-01 RX ORDER — METHOCARBAMOL 500 MG/1
500 TABLET, FILM COATED ORAL 4 TIMES DAILY
Status: DISCONTINUED | OUTPATIENT
Start: 2020-01-01 | End: 2020-01-01 | Stop reason: HOSPADM

## 2020-01-01 RX ORDER — POTASSIUM CHLORIDE 7.45 MG/ML
10 INJECTION INTRAVENOUS
Status: DISCONTINUED | OUTPATIENT
Start: 2020-01-01 | End: 2020-01-01 | Stop reason: HOSPADM

## 2020-01-01 RX ORDER — FENTANYL CITRATE 50 UG/ML
25-50 INJECTION, SOLUTION INTRAMUSCULAR; INTRAVENOUS
Status: DISCONTINUED | OUTPATIENT
Start: 2020-01-01 | End: 2020-01-01 | Stop reason: HOSPADM

## 2020-01-01 RX ORDER — NICOTINE POLACRILEX 4 MG
15-30 LOZENGE BUCCAL
Status: DISCONTINUED | OUTPATIENT
Start: 2020-01-01 | End: 2020-01-01

## 2020-01-01 RX ORDER — NALOXONE HYDROCHLORIDE 0.4 MG/ML
.2-.4 INJECTION, SOLUTION INTRAMUSCULAR; INTRAVENOUS; SUBCUTANEOUS
Status: DISCONTINUED | OUTPATIENT
Start: 2020-01-01 | End: 2020-01-01 | Stop reason: HOSPADM

## 2020-01-01 RX ORDER — TORSEMIDE 20 MG/1
40 TABLET ORAL DAILY
Qty: 60 TABLET | Refills: 5 | Status: SHIPPED | OUTPATIENT
Start: 2020-01-01 | End: 2020-01-01

## 2020-01-01 RX ORDER — MEPERIDINE HYDROCHLORIDE 25 MG/ML
12.5-25 INJECTION INTRAMUSCULAR; INTRAVENOUS; SUBCUTANEOUS
Status: DISCONTINUED | OUTPATIENT
Start: 2020-01-01 | End: 2020-01-01

## 2020-01-01 RX ORDER — FENTANYL CITRATE 50 UG/ML
INJECTION, SOLUTION INTRAMUSCULAR; INTRAVENOUS PRN
Status: DISCONTINUED | OUTPATIENT
Start: 2020-01-01 | End: 2020-01-01

## 2020-01-01 RX ORDER — SODIUM CHLORIDE 9 MG/ML
INJECTION, SOLUTION INTRAVENOUS CONTINUOUS
Status: CANCELLED | OUTPATIENT
Start: 2020-01-01

## 2020-01-01 RX ORDER — ARGATROBAN 1 MG/ML
350 INJECTION, SOLUTION INTRAVENOUS
Status: DISCONTINUED | OUTPATIENT
Start: 2020-01-01 | End: 2020-01-01 | Stop reason: HOSPADM

## 2020-01-01 RX ORDER — METOPROLOL TARTRATE 25 MG/1
25 TABLET, FILM COATED ORAL
Status: DISCONTINUED | OUTPATIENT
Start: 2020-01-01 | End: 2020-01-01 | Stop reason: HOSPADM

## 2020-01-01 RX ORDER — DOPAMINE HYDROCHLORIDE 160 MG/100ML
2-20 INJECTION, SOLUTION INTRAVENOUS CONTINUOUS PRN
Status: DISCONTINUED | OUTPATIENT
Start: 2020-01-01 | End: 2020-01-01 | Stop reason: HOSPADM

## 2020-01-01 RX ORDER — ALBUMIN (HUMAN) 12.5 G/50ML
25 SOLUTION INTRAVENOUS ONCE
Status: COMPLETED | OUTPATIENT
Start: 2020-01-01 | End: 2020-01-01

## 2020-01-01 RX ORDER — INSULIN GLARGINE 100 [IU]/ML
INJECTION, SOLUTION SUBCUTANEOUS
Qty: 18 ML | Refills: 0 | Status: ON HOLD | OUTPATIENT
Start: 2020-01-01 | End: 2020-01-01

## 2020-01-01 RX ORDER — POLYETHYLENE GLYCOL 3350 17 G/17G
17 POWDER, FOR SOLUTION ORAL 2 TIMES DAILY
Status: DISCONTINUED | OUTPATIENT
Start: 2020-01-01 | End: 2020-01-01 | Stop reason: HOSPADM

## 2020-01-01 RX ORDER — GABAPENTIN 300 MG/1
300 CAPSULE ORAL 2 TIMES DAILY
Status: DISCONTINUED | OUTPATIENT
Start: 2020-01-01 | End: 2020-01-01 | Stop reason: HOSPADM

## 2020-01-01 RX ORDER — PROPOFOL 10 MG/ML
INJECTION, EMULSION INTRAVENOUS CONTINUOUS PRN
Status: DISCONTINUED | OUTPATIENT
Start: 2020-01-01 | End: 2020-01-01

## 2020-01-01 RX ORDER — EPHEDRINE SULFATE 50 MG/ML
INJECTION, SOLUTION INTRAMUSCULAR; INTRAVENOUS; SUBCUTANEOUS PRN
Status: DISCONTINUED | OUTPATIENT
Start: 2020-01-01 | End: 2020-01-01

## 2020-01-01 RX ORDER — ONDANSETRON 4 MG/1
4 TABLET, ORALLY DISINTEGRATING ORAL EVERY 6 HOURS PRN
Status: DISCONTINUED | OUTPATIENT
Start: 2020-01-01 | End: 2020-01-01 | Stop reason: HOSPADM

## 2020-01-01 RX ORDER — FAMOTIDINE 20 MG/1
20 TABLET, FILM COATED ORAL
Status: COMPLETED | OUTPATIENT
Start: 2020-01-01 | End: 2020-01-01

## 2020-01-01 RX ORDER — ONDANSETRON 2 MG/ML
4 INJECTION INTRAMUSCULAR; INTRAVENOUS EVERY 6 HOURS PRN
Status: DISCONTINUED | OUTPATIENT
Start: 2020-01-01 | End: 2020-01-01 | Stop reason: HOSPADM

## 2020-01-01 RX ORDER — CEFAZOLIN SODIUM 2 G/100ML
2 INJECTION, SOLUTION INTRAVENOUS
Status: COMPLETED | OUTPATIENT
Start: 2020-01-01 | End: 2020-01-01

## 2020-01-01 RX ORDER — DEXTROSE MONOHYDRATE 25 G/50ML
25-50 INJECTION, SOLUTION INTRAVENOUS
Status: DISCONTINUED | OUTPATIENT
Start: 2020-01-01 | End: 2020-01-01 | Stop reason: HOSPADM

## 2020-01-01 RX ORDER — EZETIMIBE 10 MG/1
10 TABLET ORAL AT BEDTIME
Status: DISCONTINUED | OUTPATIENT
Start: 2020-01-01 | End: 2020-01-01 | Stop reason: HOSPADM

## 2020-01-01 RX ORDER — AMOXICILLIN 250 MG
1 CAPSULE ORAL 2 TIMES DAILY
Status: DISCONTINUED | OUTPATIENT
Start: 2020-01-01 | End: 2020-01-01 | Stop reason: HOSPADM

## 2020-01-01 RX ORDER — PROPOFOL 10 MG/ML
5-75 INJECTION, EMULSION INTRAVENOUS CONTINUOUS
Status: DISCONTINUED | OUTPATIENT
Start: 2020-01-01 | End: 2020-01-01

## 2020-01-01 RX ORDER — SODIUM CHLORIDE 9 MG/ML
INJECTION, SOLUTION INTRAVENOUS CONTINUOUS
Status: DISCONTINUED | OUTPATIENT
Start: 2020-01-01 | End: 2020-01-01 | Stop reason: HOSPADM

## 2020-01-01 RX ORDER — PROPOFOL 10 MG/ML
INJECTION, EMULSION INTRAVENOUS PRN
Status: DISCONTINUED | OUTPATIENT
Start: 2020-01-01 | End: 2020-01-01

## 2020-01-01 RX ORDER — FAMOTIDINE 10 MG
10 TABLET ORAL 2 TIMES DAILY
Status: DISCONTINUED | OUTPATIENT
Start: 2020-01-01 | End: 2020-01-01 | Stop reason: HOSPADM

## 2020-01-01 RX ORDER — NOREPINEPHRINE BITARTRATE 0.06 MG/ML
.03-.4 INJECTION, SOLUTION INTRAVENOUS CONTINUOUS PRN
Status: DISCONTINUED | OUTPATIENT
Start: 2020-01-01 | End: 2020-01-01 | Stop reason: HOSPADM

## 2020-01-01 RX ORDER — PHENYLEPHRINE HCL IN 0.9% NACL 50MG/250ML
.5-2 PLASTIC BAG, INJECTION (ML) INTRAVENOUS CONTINUOUS
Status: DISCONTINUED | OUTPATIENT
Start: 2020-01-01 | End: 2020-01-01

## 2020-01-01 RX ORDER — FENTANYL CITRATE 50 UG/ML
INJECTION, SOLUTION INTRAMUSCULAR; INTRAVENOUS
Status: DISCONTINUED | OUTPATIENT
Start: 2020-01-01 | End: 2020-01-01 | Stop reason: HOSPADM

## 2020-01-01 RX ORDER — LIDOCAINE HYDROCHLORIDE 20 MG/ML
INJECTION, SOLUTION INFILTRATION; PERINEURAL PRN
Status: DISCONTINUED | OUTPATIENT
Start: 2020-01-01 | End: 2020-01-01

## 2020-01-01 RX ORDER — CARVEDILOL 25 MG/1
50 TABLET ORAL 2 TIMES DAILY WITH MEALS
COMMUNITY

## 2020-01-01 RX ORDER — DEXTROSE MONOHYDRATE 25 G/50ML
25-50 INJECTION, SOLUTION INTRAVENOUS
Status: DISCONTINUED | OUTPATIENT
Start: 2020-01-01 | End: 2020-01-01

## 2020-01-01 RX ORDER — HEPARIN SODIUM 5000 [USP'U]/.5ML
5000 INJECTION, SOLUTION INTRAVENOUS; SUBCUTANEOUS EVERY 8 HOURS
Status: DISCONTINUED | OUTPATIENT
Start: 2020-01-01 | End: 2020-01-01 | Stop reason: HOSPADM

## 2020-01-01 RX ORDER — ATROPINE SULFATE 0.1 MG/ML
0.5 INJECTION INTRAVENOUS EVERY 5 MIN PRN
Status: DISCONTINUED | OUTPATIENT
Start: 2020-01-01 | End: 2020-01-01 | Stop reason: HOSPADM

## 2020-01-01 RX ORDER — MAGNESIUM SULFATE HEPTAHYDRATE 40 MG/ML
2 INJECTION, SOLUTION INTRAVENOUS DAILY PRN
Status: DISCONTINUED | OUTPATIENT
Start: 2020-01-01 | End: 2020-01-01 | Stop reason: HOSPADM

## 2020-01-01 RX ORDER — MUPIROCIN 20 MG/G
OINTMENT TOPICAL 2 TIMES DAILY
Status: DISCONTINUED | OUTPATIENT
Start: 2020-01-01 | End: 2020-01-01 | Stop reason: HOSPADM

## 2020-01-01 RX ORDER — FENTANYL CITRATE 50 UG/ML
50-100 INJECTION, SOLUTION INTRAMUSCULAR; INTRAVENOUS
Status: COMPLETED | OUTPATIENT
Start: 2020-01-01 | End: 2020-01-01

## 2020-01-01 RX ORDER — HEPARIN SODIUM 1000 [USP'U]/ML
INJECTION, SOLUTION INTRAVENOUS; SUBCUTANEOUS PRN
Status: DISCONTINUED | OUTPATIENT
Start: 2020-01-01 | End: 2020-01-01

## 2020-01-01 RX ORDER — SODIUM CHLORIDE 9 MG/ML
INJECTION, SOLUTION INTRAVENOUS CONTINUOUS
Status: DISCONTINUED | OUTPATIENT
Start: 2020-01-01 | End: 2020-01-01

## 2020-01-01 RX ORDER — EPTIFIBATIDE 2 MG/ML
2 INJECTION, SOLUTION INTRAVENOUS CONTINUOUS PRN
Status: DISCONTINUED | OUTPATIENT
Start: 2020-01-01 | End: 2020-01-01 | Stop reason: HOSPADM

## 2020-01-01 RX ORDER — DEXTROSE MONOHYDRATE 100 MG/ML
INJECTION, SOLUTION INTRAVENOUS CONTINUOUS PRN
Status: DISCONTINUED | OUTPATIENT
Start: 2020-01-01 | End: 2020-01-01 | Stop reason: HOSPADM

## 2020-01-01 RX ORDER — HYDRALAZINE HYDROCHLORIDE 20 MG/ML
10 INJECTION INTRAMUSCULAR; INTRAVENOUS EVERY 30 MIN PRN
Status: DISCONTINUED | OUTPATIENT
Start: 2020-01-01 | End: 2020-01-01 | Stop reason: HOSPADM

## 2020-01-01 RX ORDER — AMOXICILLIN 250 MG
2 CAPSULE ORAL 2 TIMES DAILY
Status: DISCONTINUED | OUTPATIENT
Start: 2020-01-01 | End: 2020-01-01 | Stop reason: HOSPADM

## 2020-01-01 RX ORDER — FAMOTIDINE 20 MG/1
20 TABLET, FILM COATED ORAL
Status: CANCELLED | OUTPATIENT
Start: 2020-01-01

## 2020-01-01 RX ORDER — HYDRALAZINE HYDROCHLORIDE 10 MG/1
10 TABLET, FILM COATED ORAL 3 TIMES DAILY
Qty: 90 TABLET | Refills: 3 | Status: SHIPPED | OUTPATIENT
Start: 2020-01-01 | End: 2020-01-01

## 2020-01-01 RX ORDER — CEFAZOLIN SODIUM 1 G/3ML
1 INJECTION, POWDER, FOR SOLUTION INTRAMUSCULAR; INTRAVENOUS SEE ADMIN INSTRUCTIONS
Status: DISCONTINUED | OUTPATIENT
Start: 2020-01-01 | End: 2020-01-01 | Stop reason: HOSPADM

## 2020-01-01 RX ORDER — MAGNESIUM SULFATE HEPTAHYDRATE 40 MG/ML
4 INJECTION, SOLUTION INTRAVENOUS EVERY 4 HOURS PRN
Status: DISCONTINUED | OUTPATIENT
Start: 2020-01-01 | End: 2020-01-01 | Stop reason: HOSPADM

## 2020-01-01 RX ORDER — VECURONIUM BROMIDE 1 MG/ML
INJECTION, POWDER, LYOPHILIZED, FOR SOLUTION INTRAVENOUS PRN
Status: DISCONTINUED | OUTPATIENT
Start: 2020-01-01 | End: 2020-01-01

## 2020-01-01 RX ORDER — HEPARIN SODIUM 10000 [USP'U]/100ML
100-1000 INJECTION, SOLUTION INTRAVENOUS CONTINUOUS PRN
Status: DISCONTINUED | OUTPATIENT
Start: 2020-01-01 | End: 2020-01-01 | Stop reason: HOSPADM

## 2020-01-01 RX ORDER — FLUMAZENIL 0.1 MG/ML
0.2 INJECTION, SOLUTION INTRAVENOUS
Status: DISCONTINUED | OUTPATIENT
Start: 2020-01-01 | End: 2020-01-01 | Stop reason: HOSPADM

## 2020-01-01 RX ORDER — DOBUTAMINE HYDROCHLORIDE 200 MG/100ML
2-20 INJECTION INTRAVENOUS CONTINUOUS PRN
Status: DISCONTINUED | OUTPATIENT
Start: 2020-01-01 | End: 2020-01-01 | Stop reason: HOSPADM

## 2020-01-01 RX ORDER — DEXTROSE MONOHYDRATE, SODIUM CHLORIDE, AND POTASSIUM CHLORIDE 50; 1.49; 4.5 G/1000ML; G/1000ML; G/1000ML
INJECTION, SOLUTION INTRAVENOUS CONTINUOUS
Status: DISCONTINUED | OUTPATIENT
Start: 2020-01-01 | End: 2020-01-01

## 2020-01-01 RX ORDER — PHENYLEPHRINE HCL IN 0.9% NACL 50MG/250ML
.5-6 PLASTIC BAG, INJECTION (ML) INTRAVENOUS CONTINUOUS PRN
Status: DISCONTINUED | OUTPATIENT
Start: 2020-01-01 | End: 2020-01-01 | Stop reason: HOSPADM

## 2020-01-01 RX ORDER — POTASSIUM CHLORIDE 29.8 MG/ML
20 INJECTION INTRAVENOUS
Status: DISCONTINUED | OUTPATIENT
Start: 2020-01-01 | End: 2020-01-01 | Stop reason: HOSPADM

## 2020-01-01 RX ORDER — EPTIFIBATIDE 2 MG/ML
180 INJECTION, SOLUTION INTRAVENOUS EVERY 10 MIN PRN
Status: DISCONTINUED | OUTPATIENT
Start: 2020-01-01 | End: 2020-01-01 | Stop reason: HOSPADM

## 2020-01-01 RX ORDER — HYDRALAZINE HYDROCHLORIDE 10 MG/1
20 TABLET, FILM COATED ORAL 3 TIMES DAILY
COMMUNITY

## 2020-01-01 RX ORDER — LIDOCAINE 4 G/G
2 PATCH TOPICAL
Status: DISCONTINUED | OUTPATIENT
Start: 2020-01-01 | End: 2020-01-01 | Stop reason: HOSPADM

## 2020-01-01 RX ORDER — OXYCODONE HYDROCHLORIDE 5 MG/1
5-10 TABLET ORAL
Status: DISCONTINUED | OUTPATIENT
Start: 2020-01-01 | End: 2020-01-01 | Stop reason: HOSPADM

## 2020-01-01 RX ORDER — MUPIROCIN 20 MG/G
0.5 OINTMENT TOPICAL 2 TIMES DAILY
Status: DISCONTINUED | OUTPATIENT
Start: 2020-01-01 | End: 2020-01-01 | Stop reason: CLARIF

## 2020-01-01 RX ORDER — CEFAZOLIN SODIUM 2 G/100ML
2 INJECTION, SOLUTION INTRAVENOUS EVERY 8 HOURS
Status: COMPLETED | OUTPATIENT
Start: 2020-01-01 | End: 2020-01-01

## 2020-01-01 RX ORDER — ARGATROBAN 1 MG/ML
150 INJECTION, SOLUTION INTRAVENOUS
Status: DISCONTINUED | OUTPATIENT
Start: 2020-01-01 | End: 2020-01-01 | Stop reason: HOSPADM

## 2020-01-01 RX ADMIN — MIDAZOLAM 1 MG: 1 INJECTION INTRAMUSCULAR; INTRAVENOUS at 06:54

## 2020-01-01 RX ADMIN — SENNOSIDES AND DOCUSATE SODIUM 1 TABLET: 8.6; 5 TABLET ORAL at 08:47

## 2020-01-01 RX ADMIN — METHOCARBAMOL 500 MG: 500 TABLET, FILM COATED ORAL at 12:32

## 2020-01-01 RX ADMIN — OXYCODONE HYDROCHLORIDE 10 MG: 5 TABLET ORAL at 00:28

## 2020-01-01 RX ADMIN — INSULIN GLARGINE 5 UNITS: 100 INJECTION, SOLUTION SUBCUTANEOUS at 22:05

## 2020-01-01 RX ADMIN — ASPIRIN 325 MG: 325 TABLET, COATED ORAL at 08:48

## 2020-01-01 RX ADMIN — PHENYLEPHRINE HYDROCHLORIDE 50 MCG: 10 INJECTION INTRAVENOUS at 07:56

## 2020-01-01 RX ADMIN — CYCLOBENZAPRINE 10 MG: 10 TABLET, FILM COATED ORAL at 14:37

## 2020-01-01 RX ADMIN — PHENYLEPHRINE HYDROCHLORIDE 200 MCG: 10 INJECTION INTRAVENOUS at 07:29

## 2020-01-01 RX ADMIN — ASPIRIN 325 MG: 325 TABLET, DELAYED RELEASE ORAL at 07:50

## 2020-01-01 RX ADMIN — SODIUM CHLORIDE 1 UNITS/HR: 9 INJECTION, SOLUTION INTRAVENOUS at 11:27

## 2020-01-01 RX ADMIN — CEFAZOLIN SODIUM 2 G: 2 INJECTION, SOLUTION INTRAVENOUS at 02:03

## 2020-01-01 RX ADMIN — SODIUM CHLORIDE, POTASSIUM CHLORIDE, SODIUM LACTATE AND CALCIUM CHLORIDE: 600; 310; 30; 20 INJECTION, SOLUTION INTRAVENOUS at 06:53

## 2020-01-01 RX ADMIN — FENTANYL CITRATE 100 MCG: 50 INJECTION, SOLUTION INTRAMUSCULAR; INTRAVENOUS at 07:59

## 2020-01-01 RX ADMIN — METOPROLOL TARTRATE 12.5 MG: 25 TABLET, FILM COATED ORAL at 10:17

## 2020-01-01 RX ADMIN — FENTANYL CITRATE 50 MCG: 50 INJECTION, SOLUTION INTRAMUSCULAR; INTRAVENOUS at 09:11

## 2020-01-01 RX ADMIN — POTASSIUM CHLORIDE, DEXTROSE MONOHYDRATE AND SODIUM CHLORIDE: 150; 5; 450 INJECTION, SOLUTION INTRAVENOUS at 01:56

## 2020-01-01 RX ADMIN — PANTOPRAZOLE SODIUM 40 MG: 40 INJECTION, POWDER, FOR SOLUTION INTRAVENOUS at 10:52

## 2020-01-01 RX ADMIN — PHENYLEPHRINE HYDROCHLORIDE 100 MCG: 10 INJECTION INTRAVENOUS at 08:32

## 2020-01-01 RX ADMIN — FAMOTIDINE 20 MG: 20 TABLET, FILM COATED ORAL at 05:53

## 2020-01-01 RX ADMIN — PROPOFOL 75 MCG/KG/MIN: 10 INJECTION, EMULSION INTRAVENOUS at 10:50

## 2020-01-01 RX ADMIN — ACETAMINOPHEN 975 MG: 325 TABLET, FILM COATED ORAL at 03:43

## 2020-01-01 RX ADMIN — ONDANSETRON 4 MG: 2 INJECTION INTRAMUSCULAR; INTRAVENOUS at 10:48

## 2020-01-01 RX ADMIN — CEFAZOLIN SODIUM 2 G: 2 INJECTION, SOLUTION INTRAVENOUS at 07:50

## 2020-01-01 RX ADMIN — SODIUM CHLORIDE: 9 INJECTION, SOLUTION INTRAVENOUS at 07:30

## 2020-01-01 RX ADMIN — POTASSIUM CHLORIDE 20 MEQ: 29.8 INJECTION, SOLUTION INTRAVENOUS at 14:27

## 2020-01-01 RX ADMIN — SUGAMMADEX 200 MG: 100 INJECTION, SOLUTION INTRAVENOUS at 10:58

## 2020-01-01 RX ADMIN — HEPARIN SODIUM 5000 UNITS: 5000 INJECTION, SOLUTION INTRAVENOUS; SUBCUTANEOUS at 01:51

## 2020-01-01 RX ADMIN — LIDOCAINE HYDROCHLORIDE 0.3 ML: 10 INJECTION, SOLUTION EPIDURAL; INFILTRATION; INTRACAUDAL; PERINEURAL at 06:53

## 2020-01-01 RX ADMIN — PROPOFOL 30 MG: 10 INJECTION, EMULSION INTRAVENOUS at 10:01

## 2020-01-01 RX ADMIN — MUPIROCIN 0.5 G: 20 OINTMENT TOPICAL at 21:00

## 2020-01-01 RX ADMIN — POLYETHYLENE GLYCOL 3350 17 G: 17 POWDER, FOR SOLUTION ORAL at 21:07

## 2020-01-01 RX ADMIN — MIDAZOLAM HYDROCHLORIDE 1 MG: 1 INJECTION, SOLUTION INTRAMUSCULAR; INTRAVENOUS at 07:21

## 2020-01-01 RX ADMIN — INSULIN ASPART 3 UNITS: 100 INJECTION, SOLUTION INTRAVENOUS; SUBCUTANEOUS at 18:05

## 2020-01-01 RX ADMIN — EZETIMIBE 10 MG: 10 TABLET ORAL at 21:07

## 2020-01-01 RX ADMIN — PANTOPRAZOLE SODIUM 40 MG: 40 TABLET, DELAYED RELEASE ORAL at 10:17

## 2020-01-01 RX ADMIN — POTASSIUM CHLORIDE, DEXTROSE MONOHYDRATE AND SODIUM CHLORIDE: 150; 5; 450 INJECTION, SOLUTION INTRAVENOUS at 08:12

## 2020-01-01 RX ADMIN — ROCURONIUM BROMIDE 50 MG: 10 INJECTION INTRAVENOUS at 07:29

## 2020-01-01 RX ADMIN — CEFAZOLIN SODIUM 2 G: 2 INJECTION, SOLUTION INTRAVENOUS at 17:19

## 2020-01-01 RX ADMIN — HEPARIN SODIUM 5000 UNITS: 5000 INJECTION, SOLUTION INTRAVENOUS; SUBCUTANEOUS at 10:20

## 2020-01-01 RX ADMIN — SODIUM CHLORIDE, POTASSIUM CHLORIDE, SODIUM LACTATE AND CALCIUM CHLORIDE: 600; 310; 30; 20 INJECTION, SOLUTION INTRAVENOUS at 07:27

## 2020-01-01 RX ADMIN — SODIUM CHLORIDE 1 UNITS/HR: 9 INJECTION, SOLUTION INTRAVENOUS at 05:00

## 2020-01-01 RX ADMIN — HYDROMORPHONE HYDROCHLORIDE 0.3 MG: 1 INJECTION, SOLUTION INTRAMUSCULAR; INTRAVENOUS; SUBCUTANEOUS at 12:20

## 2020-01-01 RX ADMIN — ALBUMIN HUMAN 250 ML: 0.05 INJECTION, SOLUTION INTRAVENOUS at 08:37

## 2020-01-01 RX ADMIN — GABAPENTIN 300 MG: 300 CAPSULE ORAL at 20:28

## 2020-01-01 RX ADMIN — LIDOCAINE HYDROCHLORIDE 100 MG: 20 INJECTION, SOLUTION INFILTRATION; PERINEURAL at 07:28

## 2020-01-01 RX ADMIN — ALBUMIN HUMAN 25 G: 0.25 SOLUTION INTRAVENOUS at 12:22

## 2020-01-01 RX ADMIN — MIDAZOLAM 1 MG: 1 INJECTION INTRAMUSCULAR; INTRAVENOUS at 06:59

## 2020-01-01 RX ADMIN — METOPROLOL TARTRATE 12.5 MG: 25 TABLET, FILM COATED ORAL at 21:07

## 2020-01-01 RX ADMIN — PHENYLEPHRINE HYDROCHLORIDE 100 MCG: 10 INJECTION INTRAVENOUS at 08:34

## 2020-01-01 RX ADMIN — LIDOCAINE 2 PATCH: 560 PATCH PERCUTANEOUS; TOPICAL; TRANSDERMAL at 12:21

## 2020-01-01 RX ADMIN — ACETAMINOPHEN 975 MG: 325 TABLET, FILM COATED ORAL at 19:24

## 2020-01-01 RX ADMIN — PROTAMINE SULFATE 300 MG: 10 INJECTION, SOLUTION INTRAVENOUS at 09:26

## 2020-01-01 RX ADMIN — HUMAN INSULIN 10 UNITS: 100 INJECTION, SOLUTION SUBCUTANEOUS at 08:52

## 2020-01-01 RX ADMIN — GABAPENTIN 300 MG: 300 CAPSULE ORAL at 20:58

## 2020-01-01 RX ADMIN — PHENYLEPHRINE HYDROCHLORIDE 100 MCG: 10 INJECTION INTRAVENOUS at 08:07

## 2020-01-01 RX ADMIN — ACETAMINOPHEN 975 MG: 325 TABLET, FILM COATED ORAL at 20:28

## 2020-01-01 RX ADMIN — SENNOSIDES AND DOCUSATE SODIUM 1 TABLET: 8.6; 5 TABLET ORAL at 10:19

## 2020-01-01 RX ADMIN — HEPARIN SODIUM 5000 UNITS: 5000 INJECTION, SOLUTION INTRAVENOUS; SUBCUTANEOUS at 19:24

## 2020-01-01 RX ADMIN — FENTANYL CITRATE 50 MCG: 50 INJECTION, SOLUTION INTRAMUSCULAR; INTRAVENOUS at 08:05

## 2020-01-01 RX ADMIN — CEFAZOLIN SODIUM 2 G: 2 INJECTION, SOLUTION INTRAVENOUS at 11:11

## 2020-01-01 RX ADMIN — OXYCODONE HYDROCHLORIDE 5 MG: 5 TABLET ORAL at 15:41

## 2020-01-01 RX ADMIN — HEPARIN SODIUM 5000 UNITS: 5000 INJECTION, SOLUTION INTRAVENOUS; SUBCUTANEOUS at 12:33

## 2020-01-01 RX ADMIN — SODIUM CHLORIDE: 9 INJECTION, SOLUTION INTRAVENOUS at 10:07

## 2020-01-01 RX ADMIN — METHOCARBAMOL 500 MG: 500 TABLET, FILM COATED ORAL at 18:07

## 2020-01-01 RX ADMIN — HYDROMORPHONE HYDROCHLORIDE 0.5 MG: 1 INJECTION, SOLUTION INTRAMUSCULAR; INTRAVENOUS; SUBCUTANEOUS at 12:59

## 2020-01-01 RX ADMIN — HEPARIN SODIUM 5000 UNITS: 5000 INJECTION, SOLUTION INTRAVENOUS; SUBCUTANEOUS at 18:09

## 2020-01-01 RX ADMIN — FENTANYL CITRATE 50 MCG: 50 INJECTION, SOLUTION INTRAMUSCULAR; INTRAVENOUS at 06:53

## 2020-01-01 RX ADMIN — MIDAZOLAM HYDROCHLORIDE 2 MG: 1 INJECTION, SOLUTION INTRAMUSCULAR; INTRAVENOUS at 08:28

## 2020-01-01 RX ADMIN — HEPARIN SODIUM 38000 UNITS: 1000 INJECTION INTRAVENOUS; SUBCUTANEOUS at 08:18

## 2020-01-01 RX ADMIN — PHENYLEPHRINE HYDROCHLORIDE 100 MCG: 10 INJECTION INTRAVENOUS at 09:34

## 2020-01-01 RX ADMIN — FENTANYL CITRATE 100 MCG: 50 INJECTION, SOLUTION INTRAMUSCULAR; INTRAVENOUS at 08:03

## 2020-01-01 RX ADMIN — Medication 5 MG: at 08:24

## 2020-01-01 RX ADMIN — FENTANYL CITRATE 100 MCG: 50 INJECTION, SOLUTION INTRAMUSCULAR; INTRAVENOUS at 08:28

## 2020-01-01 RX ADMIN — EPINEPHRINE 0.02 MCG/KG/MIN: 1 INJECTION INTRAMUSCULAR; INTRAVENOUS; SUBCUTANEOUS at 09:11

## 2020-01-01 RX ADMIN — FAMOTIDINE 10 MG: 10 TABLET, FILM COATED ORAL at 21:07

## 2020-01-01 RX ADMIN — FENTANYL CITRATE 50 MCG: 50 INJECTION, SOLUTION INTRAMUSCULAR; INTRAVENOUS at 09:22

## 2020-01-01 RX ADMIN — MIDAZOLAM HYDROCHLORIDE 2 MG: 1 INJECTION, SOLUTION INTRAMUSCULAR; INTRAVENOUS at 09:11

## 2020-01-01 RX ADMIN — MUPIROCIN: 20 OINTMENT TOPICAL at 05:45

## 2020-01-01 RX ADMIN — PHENYLEPHRINE HYDROCHLORIDE 0.3 MCG/KG/MIN: 10 INJECTION INTRAVENOUS at 07:41

## 2020-01-01 RX ADMIN — HYDROMORPHONE HYDROCHLORIDE 0.5 MG: 1 INJECTION, SOLUTION INTRAMUSCULAR; INTRAVENOUS; SUBCUTANEOUS at 16:20

## 2020-01-01 RX ADMIN — SODIUM CHLORIDE: 9 INJECTION, SOLUTION INTRAVENOUS at 07:26

## 2020-01-01 RX ADMIN — ALBUMIN HUMAN 12.5 G: 0.05 INJECTION, SOLUTION INTRAVENOUS at 00:28

## 2020-01-01 RX ADMIN — PROPOFOL 20 MG: 10 INJECTION, EMULSION INTRAVENOUS at 08:29

## 2020-01-01 RX ADMIN — PHENYLEPHRINE HYDROCHLORIDE 100 MCG: 10 INJECTION INTRAVENOUS at 08:10

## 2020-01-01 RX ADMIN — SODIUM CHLORIDE 2 UNITS/HR: 9 INJECTION, SOLUTION INTRAVENOUS at 21:58

## 2020-01-01 RX ADMIN — FENTANYL CITRATE 100 MCG: 50 INJECTION, SOLUTION INTRAMUSCULAR; INTRAVENOUS at 09:38

## 2020-01-01 RX ADMIN — PANTOPRAZOLE SODIUM 40 MG: 40 INJECTION, POWDER, FOR SOLUTION INTRAVENOUS at 13:37

## 2020-01-01 RX ADMIN — INSULIN ASPART 1 UNITS: 100 INJECTION, SOLUTION INTRAVENOUS; SUBCUTANEOUS at 12:54

## 2020-01-01 RX ADMIN — PROPOFOL 30 MCG/KG/MIN: 10 INJECTION, EMULSION INTRAVENOUS at 10:10

## 2020-01-01 RX ADMIN — ASPIRIN 325 MG: 325 TABLET, COATED ORAL at 10:19

## 2020-01-01 RX ADMIN — FENTANYL CITRATE 50 MCG: 50 INJECTION, SOLUTION INTRAMUSCULAR; INTRAVENOUS at 10:26

## 2020-01-01 RX ADMIN — PHENYLEPHRINE HYDROCHLORIDE 150 MCG: 10 INJECTION INTRAVENOUS at 07:49

## 2020-01-01 RX ADMIN — PHENYLEPHRINE HYDROCHLORIDE 100 MCG: 10 INJECTION INTRAVENOUS at 09:44

## 2020-01-01 RX ADMIN — GABAPENTIN 300 MG: 300 CAPSULE ORAL at 08:46

## 2020-01-01 RX ADMIN — FENTANYL CITRATE 200 MCG: 50 INJECTION, SOLUTION INTRAMUSCULAR; INTRAVENOUS at 07:28

## 2020-01-01 RX ADMIN — CEFAZOLIN SODIUM 1 G: 2 INJECTION, SOLUTION INTRAVENOUS at 09:50

## 2020-01-01 RX ADMIN — CYCLOBENZAPRINE 10 MG: 10 TABLET, FILM COATED ORAL at 08:47

## 2020-01-01 RX ADMIN — ALBUMIN HUMAN 500 ML: 0.05 INJECTION, SOLUTION INTRAVENOUS at 22:00

## 2020-01-01 RX ADMIN — HYDROMORPHONE HYDROCHLORIDE 0.5 MG: 1 INJECTION, SOLUTION INTRAMUSCULAR; INTRAVENOUS; SUBCUTANEOUS at 14:11

## 2020-01-01 RX ADMIN — ACETAMINOPHEN 975 MG: 325 TABLET, FILM COATED ORAL at 12:32

## 2020-01-01 RX ADMIN — POLYETHYLENE GLYCOL 3350 17 G: 17 POWDER, FOR SOLUTION ORAL at 10:17

## 2020-01-01 RX ADMIN — Medication 2.5 MG: at 08:36

## 2020-01-01 RX ADMIN — OXYCODONE HYDROCHLORIDE 10 MG: 5 TABLET ORAL at 08:47

## 2020-01-01 RX ADMIN — ACETAMINOPHEN 975 MG: 325 TABLET, FILM COATED ORAL at 04:15

## 2020-01-01 RX ADMIN — PHENYLEPHRINE HYDROCHLORIDE 100 MCG: 10 INJECTION INTRAVENOUS at 09:46

## 2020-01-01 RX ADMIN — PHENYLEPHRINE HYDROCHLORIDE 100 MCG: 10 INJECTION INTRAVENOUS at 07:37

## 2020-01-01 RX ADMIN — VECURONIUM BROMIDE 5 MG: 1 INJECTION, POWDER, LYOPHILIZED, FOR SOLUTION INTRAVENOUS at 08:20

## 2020-01-01 RX ADMIN — OXYCODONE HYDROCHLORIDE 5 MG: 5 TABLET ORAL at 21:00

## 2020-01-01 RX ADMIN — ACETAMINOPHEN 975 MG: 325 TABLET, FILM COATED ORAL at 21:00

## 2020-01-01 RX ADMIN — PHENYLEPHRINE HYDROCHLORIDE 50 MCG: 10 INJECTION INTRAVENOUS at 10:11

## 2020-01-01 RX ADMIN — OXYCODONE HYDROCHLORIDE 5 MG: 5 TABLET ORAL at 14:37

## 2020-01-01 RX ADMIN — SODIUM CHLORIDE: 9 INJECTION, SOLUTION INTRAVENOUS at 11:30

## 2020-01-01 RX ADMIN — OXYCODONE HYDROCHLORIDE 5 MG: 5 TABLET ORAL at 15:09

## 2020-01-01 RX ADMIN — AMINOCAPROIC ACID 5 G/HR: 250 INJECTION, SOLUTION INTRAVENOUS at 07:44

## 2020-01-01 RX ADMIN — AMINOCAPROIC ACID 1 G/HR: 250 INJECTION, SOLUTION INTRAVENOUS at 08:43

## 2020-01-01 RX ADMIN — Medication 2.5 MG: at 08:35

## 2020-01-01 RX ADMIN — PROPOFOL 150 MG: 10 INJECTION, EMULSION INTRAVENOUS at 07:29

## 2020-01-01 RX ADMIN — ACETAMINOPHEN 975 MG: 325 TABLET, FILM COATED ORAL at 12:44

## 2020-01-01 RX ADMIN — POTASSIUM CHLORIDE, DEXTROSE MONOHYDRATE AND SODIUM CHLORIDE: 150; 5; 450 INJECTION, SOLUTION INTRAVENOUS at 11:34

## 2020-01-01 RX ADMIN — SENNOSIDES AND DOCUSATE SODIUM 1 TABLET: 8.6; 5 TABLET ORAL at 21:07

## 2020-01-01 RX ADMIN — FENTANYL CITRATE 50 MCG: 50 INJECTION, SOLUTION INTRAMUSCULAR; INTRAVENOUS at 09:21

## 2020-01-01 ASSESSMENT — ACTIVITIES OF DAILY LIVING (ADL)
RETIRED_EATING: 0-->INDEPENDENT
SWALLOWING: 0-->SWALLOWS FOODS/LIQUIDS WITHOUT DIFFICULTY
ADLS_ACUITY_SCORE: 14
AMBULATION: 0-->INDEPENDENT
ADLS_ACUITY_SCORE: 19
ADLS_ACUITY_SCORE: 13
ADLS_ACUITY_SCORE: 13
ADLS_ACUITY_SCORE: 14
ADLS_ACUITY_SCORE: 19
TOILETING: 0-->INDEPENDENT
ADLS_ACUITY_SCORE: 14
COGNITION: 0 - NO COGNITION ISSUES REPORTED
BATHING: 0-->INDEPENDENT
ADLS_ACUITY_SCORE: 19
ADLS_ACUITY_SCORE: 14
ADLS_ACUITY_SCORE: 13
ADLS_ACUITY_SCORE: 19
DRESS: 0-->INDEPENDENT
FALL_HISTORY_WITHIN_LAST_SIX_MONTHS: NO
ADLS_ACUITY_SCORE: 11
ADLS_ACUITY_SCORE: 12
ADLS_ACUITY_SCORE: 16
ADLS_ACUITY_SCORE: 19
ADLS_ACUITY_SCORE: 14
RETIRED_COMMUNICATION: 0-->UNDERSTANDS/COMMUNICATES WITHOUT DIFFICULTY
TRANSFERRING: 0-->INDEPENDENT
ADLS_ACUITY_SCORE: 14
ADLS_ACUITY_SCORE: 13

## 2020-01-01 ASSESSMENT — MIFFLIN-ST. JEOR
SCORE: 1794.68
SCORE: 1813.73
SCORE: 1799.67
SCORE: 1830.71

## 2020-01-01 ASSESSMENT — ENCOUNTER SYMPTOMS
SEIZURES: 0
DYSRHYTHMIAS: 0

## 2020-01-01 ASSESSMENT — COPD QUESTIONNAIRES: COPD: 0

## 2020-01-01 ASSESSMENT — LIFESTYLE VARIABLES: TOBACCO_USE: 0

## 2020-01-02 PROBLEM — I50.31 ACUTE HEART FAILURE WITH PRESERVED EJECTION FRACTION (H): Status: ACTIVE | Noted: 2020-01-01

## 2020-01-02 NOTE — LETTER
1/2/2020    REINIER PINO PA-C  [address] [facility] [address]    RE: Jose Reid       Dear Colleague,    I had the pleasure of seeing Jose Reid in the [facility].    Cardiology Clinic Progress Note    Jose Reid MRN# [id number]   YOB: 1960 Age: 59 year old   Primary cardiologist: Dr. San         Assessment and Plan:     In summary, Jose Reid presents today for a f/u CORE clinic visit.     1. Hx HFmrEF - hypertensive. EF now normalized.   - remains on Losartan 25, Coreg 25 BID, Paul 25, Lasix 60 daily.   - Dry wt previously felt ~197 lbs, currently 206 lbs. Suspect mainly caloric gain.      2. TRUJILLO - Completed cardiac rehab without significant improvement. Suspect multifactorial from ongoing deconditioning and probably an element of heart failure although he appears fairly euvolemic on exam. Recently worsened. R/LHC previously recommended by Dr. San, patient considering.     3. Hypertension - borderline-controlled.    4. Hyperlipidemia -  --> 117 on zetia (primary prevention).    5. Multiple medication intolerances - tolerating current meds.     6. Hypoalbuminemia, deconditioning    7. YORDY / CKD-III - creat 1.3 (baseline ~1.1)    Plan:  - We again discussed recommendation for R/LHC. He is willing to go forward at this point.    Risks and benefits of left heart catheterization and coronary angiogram were discussed with the patient in detail. 0.1-0.3% (for diagnostic angio) and 1-2% (for PCI)  risk of stroke, MI, death, emergent bypass for diagnostic angio, risk of contrast induced allergic reaction, renal dysfunction, vascular complications were discussed. Patient understands and wishes to proceed with it. Further recommendations based on the results of coronary angiography. The patient would be an appropriate candidate for DAPT, if required.   - Due to complaint of abdominal bloating, will stop Lasix 60 and start Torsemide 40  mg daily.     Follow up:  - Me one week post-procedure with BMP.         History of Presenting Illness:      Jose Reid is a pleasant 59 year old patient who presents today for a f/u CORE clinic visit.    The patient has a history of the following -   # HFmrEF, EF 45-50% in August 2019 ECHO, normalized on cMRI 9/2019  # Small area of fibrosis at RV insertion point inferiorly on cardiac MRI, nonspecific, likely responsible for defect on Lexiscan MPI  # HTN  # Hyperlipidemia, statin intolerance, now on zetia  # IDDM, uncontrolled with an a1c of 10.4  # YORDY / CKD, creatinine up to 2.5 in 7/2019 while admitted with back abscess and uncontrolled diabetes, on vancomycin, lisinopril/hctz (improved with med cessation) - baseline ~1.1  # Peripheral neuropathy with bilateral foot drop, following with neurology  # Back abscess, wound care following  # Social - lives at home with wife Kassie. He works as a  in hospitals including [employer] but is seeking short-term disability.    I have been following Jose for HFmrEF since his diagnosis in August 2019. Please see my note from September for details.     In summary, following BP control, diuresis and medication optimization, Jose experienced symptomatic improvement but not resolution. When I saw him last, he complained of ongoing TRUJILLO and I suspected he was deconditioned with LE neuropathy and imbalance contributing. I increased his Lasix to 60 mg daily, and recommended cardiac rehab - which he completed (although only reaching 3 METs) - and neurology assessment through which he is undergoing rehab currently. Despite this, he noted improved albeit ongoing TRUJILLO when he saw Dr. San in November. His creatinine had increased from 1.1 --> 1.4 and he appeared euvolemic. A R/LHC was recommended for further assessment. However the patient and his wife wished to defer this for the moment and consider further at home.      Today, he presents for reassessment with his wife. He notes  "that over the past week his TRUJILLO and abdominal bloating have gotten a little worse. He has no orthopnea or leg swelling. He's also noticed brief central chest discomfort off/on lasting seconds in duration. He is now walking around better as he has leg braces so he's very happy about this. He thinks this has given him access to more food options and as a result he's eating more. His clinic weight is up 5 lbs from prior, this has been a gradual gain he states. Creatinine is slightly better at 1.3. BP is borderline-controlled.         Review of Systems:     12-pt ROS is negative except for as noted in the HPI.          Physical Exam:     Vitals: /80   Pulse 82   Ht 1.803 m (5' 11\")   Wt 95.8 kg (211 lb 1.6 oz)   SpO2 94%   BMI 29.44 kg/m     Wt Readings from Last 10 Encounters:   01/02/20 95.8 kg (211 lb 1.6 oz)   11/22/19 93.7 kg (206 lb 9.6 oz)   11/07/19 93.5 kg (206 lb 3.2 oz)   10/29/19 92.1 kg (203 lb)   10/03/19 91.2 kg (201 lb)   09/24/19 91.4 kg (201 lb 8 oz)   09/18/19 88.9 kg (196 lb)   08/29/19 88.8 kg (195 lb 12.8 oz)   08/26/19 89.7 kg (197 lb 12.8 oz)   08/24/19 89.5 kg (197 lb 4.8 oz)       Constitutional:  Patient is pleasant, alert, cooperative, and in NAD.  HEENT:  NCAT. PERRLA. EOM's intact.   Neck:  CVP appears normal. No carotid bruits.   Pulmonary: Normal respiratory effort. Reduced BS diffusely, soft crackles bases.   Cardiac: RRR, normal S1/S2, no S3/S4, grade 2/6 sm at the LSB.   Abdomen:  Non-tender abdomen, no hepatosplenomegaly appreciated.   Vascular: Pulses in the upper and lower extremities are 2+ and equal bilaterally.  Extremities: No edema.  Skin:  No rashes or lesions appreciated.   Neurological:  No gross motor or sensory deficits.   Psych: Appropriate affect.        Data:     Labs reviewed:  Recent Labs   Lab Test 11/22/19  0947 10/29/19  1201 08/29/19  0825 08/23/19  0558 03/01/19  1050 07/03/12  1938   *  --   --   --  190* 82   HDL 42  --   --   --  41 25* "   NHDL 176*  --   --   --  244*  --    CHOL 218*  --   --   --  285*  --    TRIG 294*  --   --   --  272* 1583*   TSH  --  2.73  --  4.34* 2.12  --    NTBNP 444*  --  573*  --   --   --        Lab Results   Component Value Date    WBC 9.3 08/23/2019    RBC 4.59 08/23/2019    HGB 14.0 08/29/2019    HCT 42.0 08/23/2019    MCV 92 08/23/2019    MCH 30.7 08/23/2019    MCHC 33.6 08/23/2019    RDW 14.9 08/23/2019     08/23/2019       Lab Results   Component Value Date     01/02/2020    POTASSIUM 4.6 01/02/2020    CHLORIDE 102 01/02/2020    CO2 31 (H) 01/02/2020    ANIONGAP 9.6 01/02/2020     (H) 01/02/2020    BUN 34 (H) 01/02/2020    CR 1.31 (H) 01/02/2020    GFRESTIMATED 56 (L) 01/02/2020    GFRESTBLACK 68 01/02/2020    SUE 9.3 01/02/2020      Lab Results   Component Value Date    AST 15 08/20/2019    ALT 19 08/20/2019       Lab Results   Component Value Date    A1C 11.4 (H) 11/07/2019       No results found for: INR        Problem List:     Patient Active Problem List   Diagnosis     Type 2 diabetes, HbA1c goal < 7% (H)     Hyperlipidemia with target LDL less than 100     Obesity     Back abscess     HTN (hypertension)     CHF exacerbation (H)     Diabetic polyneuropathy associated with type 2 diabetes mellitus (H)     Congestive heart failure, unspecified HF chronicity, unspecified heart failure type (H)           Medications:     Current Outpatient Medications   Medication Sig Dispense Refill     alpha-lipoic acid 100 MG capsule Take 600 mg by mouth daily       aspirin 81 MG EC tablet Take 81 mg by mouth At Bedtime  90 tablet 3     carvedilol (COREG) 25 MG tablet Take 1 tablet (25 mg) by mouth 2 times daily (with meals) 60 tablet 11     cyanocobalamin (CYANOCOBALAMIN) 1000 MCG/ML injection Inject 1,000 mcg into the muscle every 3 days        ezetimibe (ZETIA) 10 MG tablet Take 1 tablet (10 mg) by mouth At Bedtime 30 tablet 11     furosemide (LASIX) 40 MG tablet Take 1 tablet(40mg) by mouth every day  for weight <197#. Take 1 1/2 tablets(60mg) for weight >/= 197# 135 tablet 3     insulin aspart (NOVOLOG PEN) 100 UNIT/ML pen Pre-Meal 140-189 =1 unit,190-239 =2 units,240-289 =3 units,290-339 =4 units,= or >340 =5 units; Max 15 units/day 27 mL 1     insulin glargine (LANTUS PEN) 100 UNIT/ML pen Inject 50 Units Subcutaneous At Bedtime 24 mL 1     losartan (COZAAR) 50 MG tablet Take 0.5 tablets (25 mg) by mouth daily (with dinner) 15 tablet 11     order for DME Equipment being ordered: Bilateral wrist splints. 1 each 0     order for DME Equipment being ordered: Bilateral ulnar braces and ulnar pads. 1 each 0     order for DME Equipment being ordered: bilateral AFO's. 1 each 0     spironolactone (ALDACTONE) 25 MG tablet Take 1 tablet (25 mg) by mouth daily 30 tablet 11     vitamin B complex with vitamin C (STRESS TAB) tablet Take 1 tablet by mouth At Bedtime       Vitamin D, Cholecalciferol, 1000 units TABS Take 2 tablets by mouth At Bedtime strength unknown              Past Medical History:     Past Medical History:   Diagnosis Date     Abscess     MSSA back abscess     Diabetes (H)      Dyslipidemia      Erectile dysfunction      Hypertension      Peripheral neuropathy      Seasonal depression (H)      Past Surgical History:   Procedure Laterality Date     APPENDECTOMY       INCISION AND DRAINAGE, ABSCESS, SIMPLE N/A 7/19/2019    Procedure: INCISION AND DRAINAGE OF BACK ABSCESS;  Surgeon: Richard Lopez MD;  Location:  GI     Family History   Problem Relation Age of Onset     Hypertension Maternal Grandmother      Diabetes Maternal Grandmother      Family History Negative Mother      Unknown/Adopted Father      Diabetes Paternal Grandmother      Cerebrovascular Disease Paternal Grandmother      Family History Negative Brother      Family History Negative Sister      Family History Negative Son      Family History Negative Daughter      Social History     Socioeconomic History     Marital status:       Spouse name: Not on file     Number of children: Not on file     Years of education: Not on file     Highest education level: Not on file   Occupational History     Not on file   Social Needs     Financial resource strain: Not on file     Food insecurity:     Worry: Not on file     Inability: Not on file     Transportation needs:     Medical: Not on file     Non-medical: Not on file   Tobacco Use     Smoking status: Never Smoker     Smokeless tobacco: Never Used   Substance and Sexual Activity     Alcohol use: No     Frequency: Never     Drug use: No     Sexual activity: Not on file   Lifestyle     Physical activity:     Days per week: Not on file     Minutes per session: Not on file     Stress: Not on file   Relationships     Social connections:     Talks on phone: Not on file     Gets together: Not on file     Attends [employer] service: Not on file     Active member of club or organization: Not on file     Attends meetings of clubs or organizations: Not on file     Relationship status: Not on file     Intimate partner violence:     Fear of current or ex partner: Not on file     Emotionally abused: Not on file     Physically abused: Not on file     Forced sexual activity: Not on file   Other Topics Concern     Parent/sibling w/ CABG, MI or angioplasty before 65F 55M? Not Asked   Social History Narrative     Not on file           Allergies:   Exenatide; Hmg-coa-r inhibitors; Amlodipine; and Hctz [hydrochlorothiazide]      Ludmila Soares PA-C, PA-C  [facility]  Pager: [phone]    Thank you for allowing me to participate in the care of your patient.    Sincerely,     Ludmila Soares PA-C     [facility]

## 2020-01-02 NOTE — PATIENT INSTRUCTIONS
Component      Latest Ref Rng & Units 2019   Sodium      136 - 145 mmol/L 140 138   Potassium      3.5 - 5.1 mmol/L 4.7 4.6   Chloride      98 - 107 mmol/L 102 102   Carbon Dioxide      23 - 29 mmol/L 32 (H) 31 (H)   Anion Gap      6 - 17 mmol/L 10.7 9.6   Glucose      70 - 105 mg/dL 236 (H) 293 (H)   Urea Nitrogen      7 - 30 mg/dL 28 34 (H)   Creatinine      0.70 - 1.30 mg/dL 1.41 (H) 1.31 (H)   GFR Estimate      >60 mL/min/1.73:m2 52 (L) 56 (L)   GFR Estimate If Black      >60 mL/min/1.73:m2 62 68   Calcium      8.5 - 10.5 mg/dL 9.4 9.3     Today, we discussed the following:   - Results: See above.   - Medication changes:     Stop furosemide, start Torsemide 40 mg daily.   - Follow up:    I will order a coronary angiogram, right and left heart catheterization to have at your convenience. I will see you the week after.     Please, remember to continue the followin.  Weigh yourself daily. Call if your weight is up > than 2 pounds in one day, or 5 pounds in one week; if you feel more short of breath or have worsening swelling in your legs or abdomen.  2.  Eat a low sodium diet (less than 2,000mg or 2g daily). If you eat less salt, you will retain less fluid.   3.  Avoid alcohol, as this can worsen heart failure.   4.  Avoid NSAIDs as able (For example, Ibuprofen / aleve / advil / naprosen / diclofenac).    Please call CORE nurse for any questions or concerns Mon-Fri 8am-4pm:   [phone]  For concerns after hours:   [phone]   Scheduling phone number:   [phone]    Thank you for visiting with us today.   Ludmila Soares PA-C  ______________________________________________________________

## 2020-01-02 NOTE — PROGRESS NOTES
Cardiology Clinic Progress Note    Jose Reid MRN# [id number]   YOB: 1960 Age: 59 year old   Primary cardiologist: Dr. San         Assessment and Plan:     In summary, Jose Reid presents today for a f/u CORE clinic visit.     1. Hx HFmrEF - hypertensive. EF now normalized.   - remains on Losartan 25, Coreg 25 BID, Paul 25, Lasix 60 daily.   - Dry wt previously felt ~197 lbs, currently 206 lbs. Suspect mainly caloric gain.      2. TRUJILLO - Completed cardiac rehab without significant improvement. Suspect multifactorial from ongoing deconditioning and probably an element of heart failure although he appears fairly euvolemic on exam. Recently worsened. R/LHC previously recommended by Dr. San, patient considering.     3. Hypertension - borderline-controlled.    4. Hyperlipidemia -  --> 117 on zetia (primary prevention).    5. Multiple medication intolerances - tolerating current meds.     6. Hypoalbuminemia, deconditioning    7. YORDY / CKD-III - creat 1.3 (baseline ~1.1)    Plan:  - We again discussed recommendation for R/LHC. He is willing to go forward at this point.    Risks and benefits of left heart catheterization and coronary angiogram were discussed with the patient in detail. 0.1-0.3% (for diagnostic angio) and 1-2% (for PCI)  risk of stroke, MI, death, emergent bypass for diagnostic angio, risk of contrast induced allergic reaction, renal dysfunction, vascular complications were discussed. Patient understands and wishes to proceed with it. Further recommendations based on the results of coronary angiography. The patient would be an appropriate candidate for DAPT, if required.   - Due to complaint of abdominal bloating, will stop Lasix 60 and start Torsemide 40 mg daily.     Follow up:  - Me one week post-procedure with BMP.         History of Presenting Illness:      Jose Reid is a pleasant 59 year old patient who presents today for a f/u CORE clinic visit.    The patient  has a history of the following -   # HFmrEF, EF 45-50% in August 2019 ECHO, normalized on cMRI 9/2019  # Small area of fibrosis at RV insertion point inferiorly on cardiac MRI, nonspecific, likely responsible for defect on Lexiscan MPI  # HTN  # Hyperlipidemia, statin intolerance, now on zetia  # IDDM, uncontrolled with an a1c of 10.4  # YORDY / CKD, creatinine up to 2.5 in 7/2019 while admitted with back abscess and uncontrolled diabetes, on vancomycin, lisinopril/hctz (improved with med cessation) - baseline ~1.1  # Peripheral neuropathy with bilateral foot drop, following with neurology  # Back abscess, wound care following  # Social - lives at home with wife Kassie. He works as a  in hospitals including [employer] but is seeking short-term disability.    I have been following Jose for HFmrEF since his diagnosis in August 2019. Please see my note from September for details.     In summary, following BP control, diuresis and medication optimization, Jose experienced symptomatic improvement but not resolution. When I saw him last, he complained of ongoing TRUJILLO and I suspected he was deconditioned with LE neuropathy and imbalance contributing. I increased his Lasix to 60 mg daily, and recommended cardiac rehab - which he completed (although only reaching 3 METs) - and neurology assessment through which he is undergoing rehab currently. Despite this, he noted improved albeit ongoing TRUJILLO when he saw Dr. San in November. His creatinine had increased from 1.1 --> 1.4 and he appeared euvolemic. A R/LHC was recommended for further assessment. However the patient and his wife wished to defer this for the moment and consider further at home.      Today, he presents for reassessment with his wife. He notes that over the past week his TRUJILLO and abdominal bloating have gotten a little worse. He has no orthopnea or leg swelling. He's also noticed brief central chest discomfort off/on lasting seconds in duration. He is now walking  "around better as he has leg braces so he's very happy about this. He thinks this has given him access to more food options and as a result he's eating more. His clinic weight is up 5 lbs from prior, this has been a gradual gain he states. Creatinine is slightly better at 1.3. BP is borderline-controlled.         Review of Systems:     12-pt ROS is negative except for as noted in the HPI.          Physical Exam:     Vitals: /80   Pulse 82   Ht 1.803 m (5' 11\")   Wt 95.8 kg (211 lb 1.6 oz)   SpO2 94%   BMI 29.44 kg/m    Wt Readings from Last 10 Encounters:   01/02/20 95.8 kg (211 lb 1.6 oz)   11/22/19 93.7 kg (206 lb 9.6 oz)   11/07/19 93.5 kg (206 lb 3.2 oz)   10/29/19 92.1 kg (203 lb)   10/03/19 91.2 kg (201 lb)   09/24/19 91.4 kg (201 lb 8 oz)   09/18/19 88.9 kg (196 lb)   08/29/19 88.8 kg (195 lb 12.8 oz)   08/26/19 89.7 kg (197 lb 12.8 oz)   08/24/19 89.5 kg (197 lb 4.8 oz)       Constitutional:  Patient is pleasant, alert, cooperative, and in NAD.  HEENT:  NCAT. PERRLA. EOM's intact.   Neck:  CVP appears normal. No carotid bruits.   Pulmonary: Normal respiratory effort. Reduced BS diffusely, soft crackles bases.   Cardiac: RRR, normal S1/S2, no S3/S4, grade 2/6 sm at the LSB.   Abdomen:  Non-tender abdomen, no hepatosplenomegaly appreciated.   Vascular: Pulses in the upper and lower extremities are 2+ and equal bilaterally.  Extremities: No edema.  Skin:  No rashes or lesions appreciated.   Neurological:  No gross motor or sensory deficits.   Psych: Appropriate affect.        Data:     Labs reviewed:  Recent Labs   Lab Test 11/22/19  0947 10/29/19  1201 08/29/19  0825 08/23/19  0558 03/01/19  1050 07/03/12  1938   *  --   --   --  190* 82   HDL 42  --   --   --  41 25*   NHDL 176*  --   --   --  244*  --    CHOL 218*  --   --   --  285*  --    TRIG 294*  --   --   --  272* 1583*   TSH  --  2.73  --  4.34* 2.12  --    NTBNP 444*  --  573*  --   --   --        Lab Results   Component Value Date "    WBC 9.3 08/23/2019    RBC 4.59 08/23/2019    HGB 14.0 08/29/2019    HCT 42.0 08/23/2019    MCV 92 08/23/2019    MCH 30.7 08/23/2019    MCHC 33.6 08/23/2019    RDW 14.9 08/23/2019     08/23/2019       Lab Results   Component Value Date     01/02/2020    POTASSIUM 4.6 01/02/2020    CHLORIDE 102 01/02/2020    CO2 31 (H) 01/02/2020    ANIONGAP 9.6 01/02/2020     (H) 01/02/2020    BUN 34 (H) 01/02/2020    CR 1.31 (H) 01/02/2020    GFRESTIMATED 56 (L) 01/02/2020    GFRESTBLACK 68 01/02/2020    SUE 9.3 01/02/2020      Lab Results   Component Value Date    AST 15 08/20/2019    ALT 19 08/20/2019       Lab Results   Component Value Date    A1C 11.4 (H) 11/07/2019       No results found for: INR        Problem List:     Patient Active Problem List   Diagnosis     Type 2 diabetes, HbA1c goal < 7% (H)     Hyperlipidemia with target LDL less than 100     Obesity     Back abscess     HTN (hypertension)     CHF exacerbation (H)     Diabetic polyneuropathy associated with type 2 diabetes mellitus (H)     Congestive heart failure, unspecified HF chronicity, unspecified heart failure type (H)           Medications:     Current Outpatient Medications   Medication Sig Dispense Refill     alpha-lipoic acid 100 MG capsule Take 600 mg by mouth daily       aspirin 81 MG EC tablet Take 81 mg by mouth At Bedtime  90 tablet 3     carvedilol (COREG) 25 MG tablet Take 1 tablet (25 mg) by mouth 2 times daily (with meals) 60 tablet 11     cyanocobalamin (CYANOCOBALAMIN) 1000 MCG/ML injection Inject 1,000 mcg into the muscle every 3 days        ezetimibe (ZETIA) 10 MG tablet Take 1 tablet (10 mg) by mouth At Bedtime 30 tablet 11     furosemide (LASIX) 40 MG tablet Take 1 tablet(40mg) by mouth every day for weight <197#. Take 1 1/2 tablets(60mg) for weight >/= 197# 135 tablet 3     insulin aspart (NOVOLOG PEN) 100 UNIT/ML pen Pre-Meal 140-189 =1 unit,190-239 =2 units,240-289 =3 units,290-339 =4 units,= or >340 =5 units; Max 15  units/day 27 mL 1     insulin glargine (LANTUS PEN) 100 UNIT/ML pen Inject 50 Units Subcutaneous At Bedtime 24 mL 1     losartan (COZAAR) 50 MG tablet Take 0.5 tablets (25 mg) by mouth daily (with dinner) 15 tablet 11     order for DME Equipment being ordered: Bilateral wrist splints. 1 each 0     order for DME Equipment being ordered: Bilateral ulnar braces and ulnar pads. 1 each 0     order for DME Equipment being ordered: bilateral AFO's. 1 each 0     spironolactone (ALDACTONE) 25 MG tablet Take 1 tablet (25 mg) by mouth daily 30 tablet 11     vitamin B complex with vitamin C (STRESS TAB) tablet Take 1 tablet by mouth At Bedtime       Vitamin D, Cholecalciferol, 1000 units TABS Take 2 tablets by mouth At Bedtime strength unknown              Past Medical History:     Past Medical History:   Diagnosis Date     Abscess     MSSA back abscess     Diabetes (H)      Dyslipidemia      Erectile dysfunction      Hypertension      Peripheral neuropathy      Seasonal depression (H)      Past Surgical History:   Procedure Laterality Date     APPENDECTOMY       INCISION AND DRAINAGE, ABSCESS, SIMPLE N/A 7/19/2019    Procedure: INCISION AND DRAINAGE OF BACK ABSCESS;  Surgeon: Richard Lopez MD;  Location:  GI     Family History   Problem Relation Age of Onset     Hypertension Maternal Grandmother      Diabetes Maternal Grandmother      Family History Negative Mother      Unknown/Adopted Father      Diabetes Paternal Grandmother      Cerebrovascular Disease Paternal Grandmother      Family History Negative Brother      Family History Negative Sister      Family History Negative Son      Family History Negative Daughter      Social History     Socioeconomic History     Marital status:      Spouse name: Not on file     Number of children: Not on file     Years of education: Not on file     Highest education level: Not on file   Occupational History     Not on file   Social Needs     Financial resource strain:  Not on file     Food insecurity:     Worry: Not on file     Inability: Not on file     Transportation needs:     Medical: Not on file     Non-medical: Not on file   Tobacco Use     Smoking status: Never Smoker     Smokeless tobacco: Never Used   Substance and Sexual Activity     Alcohol use: No     Frequency: Never     Drug use: No     Sexual activity: Not on file   Lifestyle     Physical activity:     Days per week: Not on file     Minutes per session: Not on file     Stress: Not on file   Relationships     Social connections:     Talks on phone: Not on file     Gets together: Not on file     Attends Restoration service: Not on file     Active member of club or organization: Not on file     Attends meetings of clubs or organizations: Not on file     Relationship status: Not on file     Intimate partner violence:     Fear of current or ex partner: Not on file     Emotionally abused: Not on file     Physically abused: Not on file     Forced sexual activity: Not on file   Other Topics Concern     Parent/sibling w/ CABG, MI or angioplasty before 65F 55M? Not Asked   Social History Narrative     Not on file           Allergies:   Exenatide; Hmg-coa-r inhibitors; Amlodipine; and Hctz [hydrochlorothiazide]      Ludmila Soares PA-C, QI  [facility]  Pager: [phone]

## 2020-01-21 NOTE — TELEPHONE ENCOUNTER
Left message to return call for pre cath instructions  Is on insulin and Torsemide which needs to be held for left and right heart Cath.

## 2020-01-22 NOTE — TELEPHONE ENCOUNTER
Patient returned call 1-  Spoke to patient to review angiogram scheduled 1-  to arrive at 6:30for a  8:30 procedure. Instructed patient to not eat or drink after midnight and take AM medications to include ASA. Held Torsemide for left and right heart cath Verified patient does not have a known contrast allergy. Verified wife will drive patient home and be available 24 hours post angiogram Answered all patient questions and verbalized understanding. Orders placed in EPIC.

## 2020-01-22 NOTE — TELEPHONE ENCOUNTER
"Requested Prescriptions   Pending Prescriptions Disp Refills     LANTUS SOLOSTAR 100 UNIT/ML soln [Pharmacy Med Name: Lantus SoloStar 100 UNIT/ML Subcutaneous Solution Pen-injector]  Last Written Prescription Date:  11/7/2019  Last Fill Quantity: 24 mL,  # refills: 1   Last office visit: 11/7/2019 with prescribing provider:  MIRTA Larry   Future Office Visit:   18 mL 0     Sig: INJECT 40 UNITS SUBCUTANEOUSLY AT BEDTIME       Long Acting Insulin Protocol Passed - 1/22/2020  1:24 PM        Passed - Blood pressure less than 140/90 in past 6 months     BP Readings from Last 3 Encounters:   01/02/20 130/80   11/22/19 128/82   11/07/19 (!) 150/94             Passed - LDL on file in past 12 months     Recent Labs   Lab Test 11/22/19  0947   *             Passed - Microalbumin on file in past 12 months     Recent Labs   Lab Test 03/01/19  1158 07/03/12  1949   [id number]  --  5.3   [id number]  --  7.7   MICROL 1,610  --    UMALCR 3,326.45*  --              Passed - Serum creatinine on file in past 12 months     Recent Labs   Lab Test 01/02/20  0905  09/09/19  1429   CR 1.31*   < >  --    CREAT  --   --  1.3*    < > = values in this interval not displayed.             Passed - HgbA1C in past 3 or 6 months     If HgbA1C is 8 or greater, it needs to be on file within the past 3 months.  If less than 8, must be on file within the past 6 months.     Recent Labs   Lab Test 11/07/19  1420   A1C 11.4*             Passed - Medication is active on med list        Passed - Patient is age 18 or older        Passed - Recent (6 mo) or future (30 days) visit within the authorizing provider's specialty     Patient had office visit in the last 6 months or has a visit in the next 30 days with authorizing provider or within the authorizing provider's specialty.  See \"Patient Info\" tab in inbasket, or \"Choose Columns\" in Meds & Orders section of the refill encounter.            "

## 2020-01-23 NOTE — TELEPHONE ENCOUNTER
Prescription approved per [facility] Refill Protocol.  Danica Lopez,Clinic Rn  [facility]

## 2020-01-24 PROBLEM — Z98.890 STATUS POST CORONARY ANGIOGRAM: Status: ACTIVE | Noted: 2020-01-01

## 2020-01-24 NOTE — PRE-PROCEDURE
GENERAL PRE-PROCEDURE:   Procedure:  Coronary angiogram  Date/Time:  1/24/2020 11:00 AM    Verbal consent obtained?: Yes    Risks and benefits: Risks, benefits and alternatives were discussed    Consent given by:  Patient  Patient states understanding of procedure being performed: Yes    Patient's understanding of procedure matches consent: Yes    Procedure consent matches procedure scheduled: Yes    Appropriately NPO:  Yes  ASA Class:  Class 3- Severe systemic disease, definite functional limitations  Mallampati  :  Grade 3- soft palate visible, posterior pharyngeal wall not visible  Lungs:  Lungs clear with good breath sounds bilaterally  Heart:  Normal heart sounds and rate  History & Physical reviewed:  History and physical reviewed and no updates needed  Statement of review:  I have reviewed the lab findings, diagnostic data, medications, and the plan for sedation

## 2020-01-24 NOTE — DISCHARGE INSTRUCTIONS
Cardiac Angiogram Discharge Instructions - Femoral    After you go home:      Have an adult stay with you until tomorrow.    Drink extra fluids for 2 days.    You may resume your normal diet.    No smoking       For 24 hours - due to the sedation you received:    Relax and take it easy.    Do NOT make any important or legal decisions.    Do NOT drive or operate machines at home or at work.    Do NOT drink alcohol.    Care of Groin Puncture Site:      For the first 24 hrs - check the puncture site every 1-2 hours while awake.    For 2 days, when you cough, sneeze, laugh or move your bowels, hold your hand over the puncture site and press firmly.    Remove the bandaid after 24 hours. If there is minor oozing, apply another bandaid and remove it after 12 hours.    It is normal to have a small bruise or pea size lump at the site.    You may shower tomorrow. Do NOT take a bath, or use a hot tub or pool for at least 3 days. Do NOT scrub the site. Do not use lotion or powder near the puncture site.    Activity:            For 2 days:    No stooping or squatting    Do NOT do any heavy activity such as exercise, lifting, or straining.     No housework, yard work or any activity that make you sweat    Do NOT lift more than 10 pounds    Bleeding:      If you start bleeding from the site in your groin, lie down flat and press firmly on/above the site for 10 minutes.     Once bleeding stops, lay flat for 2 hours.     Call [facility] as soon as you can.       Call 911 right away if you have heavy bleeding or bleeding that does not stop.      Medicines:      If you are taking an antiplatelet medication such as Plavix, Brilinta or Effient, do not stop taking it until you talk to your cardiologist.      If you have stopped any medicines, check with your provider about when to restart them.    Follow Up Appointments:      Follow up with [facility] Nurse Practitioner at [facility] of patient preference in 7-10 days.    Call the  clinic if:      You have increased pain or a large or growing hard lump around the site.    The site is red, swollen, hot or tender.    Blood or fluid is draining from the site.    You have chills or a fever greater than 101 F (38 C).    Your leg feels numb, cool or changes color.    You have hives, a rash or unusual itching.    New pain in the back or belly that you cannot control with Tylenol.    Any questions or concerns.          [facility] [place]:    [phone] UMP (7 days a week)

## 2020-01-24 NOTE — PROGRESS NOTES
Care Suites Post Procedure Note    Patient Information  Name: Jose Reid  Age: 59 year old    Post Procedure  Procedure: left and right heart cath  Time patient returned to Care Suites: 1130  Concerns/abnormal assessment: none at this time  If abnormal assessment, provider notified: N/A  Plan/Other: BR x 2 hours and surgical consult before discharge    Dangelo Liao RN

## 2020-01-24 NOTE — PROGRESS NOTES
Care Suites Discharge Nursing Note    Patient Information  Name: Jose Reid  Age: 59 year old    Discharge Education:  Discharge instructions reviewed: YES  Additional education/resources provided: appt with Dr Lu  Patient/patient representative verbalizes understanding: YES  Patient discharging on new medications: YES and NO  Medication education completed: YES    Discharge Plans:   Discharge location: home  Discharge name/phone number:kelvin [phone]  Discharge ride contacted: YES  Approximate discharge time: 1515    Discharge Criteria:  Discharge criteria met and vital signs stable: YES    Patient Belongs:  Patient belongings returned to patient: YES    Dangelo Liao RN

## 2020-01-24 NOTE — PROGRESS NOTES
Pt and wife updated plan to go about 1030.  BS checked after insulin .  Complimentary parking and gift certificate given

## 2020-01-24 NOTE — PROGRESS NOTES
Care Suites Admission Nursing Note    Patient Information  Name: Jose Reid  Age: 59 year old  Reason for admission: right and left heart cath  Care Suites arrival time: 0631    Patient Admission/Assessment   Pre-procedure assessment complete: NO  If abnormal assessment/labs, provider notified: Yes , Dr Agustin carvajal pt given 10 unite reg insulin  NPO: YES  Medications held per instructions/orders: YES  Consent: completed at 0840  Patient oriented to room: YES  Education/questions answered: YES  Plan/other: proceed as planned    Discharge Planning  Accompanied by: wife Kassie  Discharge name/phone number: [phone]   Overnight post sedation caregiver: Kassie  Discharge location: home    Dangelo Liao RN

## 2020-01-31 NOTE — PROGRESS NOTES
[facility]    Ludmila Soares PA-C P Su [facility] Core Nurse             Hi - this patient is seeing me for angiogram followup on 2/3 AM and sees Voeller in the afternoon same day for consult for CABG. Probably doesn't need to see me same day unless he wants to. Thanks.      Called patient and left detailed VM. Instructed to call back if he'd like to cancel OV in which case BMP could be r/s to 1430.     Nancy Wei, LIZZYN, RN, PHN, HNB-BC   1/31/2020 at 9:53 AM    Update: Patient returned call and would like to cancel OV with Ludmila. Labs moved back to 1430. Left VM to confirm.     1/31/2020 at 11:27 AM

## 2020-02-03 NOTE — LETTER
2/3/2020      RE: Jose Reid  [address]  [place] [place] [address]       Dear Colleague,    Thank you for the opportunity to participate in the care of your patient, Jose Reid, at the [facility] [place] [facility] at [facility]. Please see a copy of my visit note below.    CV Surgery    Patient seen, clinic note dictated #[id number].    Doris Lu MD    Please do not hesitate to contact me if you have any questions/concerns.     Sincerely,     Doris Lu MD

## 2020-02-03 NOTE — PROGRESS NOTES
Service Date: 02/03/2020      CLINIC CONSULTATION REPORT      REFERRING CARDIOLOGIST:  Guzman San MD      REASON FOR CONSULTATION:  Evaluation for coronary artery bypass grafting.      HISTORY OF PRESENT ILLNESS:  Mr. Reid is a very pleasant 59-year-old gentleman with insulin-dependent type 2 diabetes and a history of decompensated congestive heart failure within the last year with a mildly diminished LV function.  Extensive workup led to a coronary angiogram that demonstrated severe diffuse 3-vessel coronary artery disease.  The patient was referred to our surgical clinic to be evaluated for possible coronary artery bypass grafting.  He also has chronic renal insufficiency with a baseline creatinine level of around 1.4.      PAST MEDICAL HISTORY:  Significant for MSSA back abscess that was I&D'd and treated with antibiotics last summer, type 2 diabetes with poorly controlled blood sugar levels with A1c level as high as 15 within the last year, dyslipidemia, hypertension, peripheral neuropathy and depression.      PAST SURGICAL HISTORY:  Appendectomy and I&D of a back abscess in 07/2019.      FAMILY HISTORY:  Significant for diabetes and stroke.      SOCIAL HISTORY:  He is .  He is a  but is currently on disability.  Has never smoked, does not drink alcohol.      ALLERGIES:  Exenatide, HMG-CoA reductase inhibitors, amlodipine, hydrochlorothiazide.      CURRENT OUTPATIENT MEDICATIONS:  Reviewed in Epic chart.      REVIEW OF SYSTEMS:  As per HPI.  The patient admits to having worsening fatigue and decreased exercise tolerance and dyspnea with exertion.  He denies any chest pain.  All other 10-point review of systems are completed and were otherwise negative unless stated above.      PHYSICAL EXAMINATION:   VITAL SIGNS:  Blood pressure is 138/82, pulse is 82.  He is 97 kg, 5 feet 11 inches, satting 97% on room air.   GENERAL:  He appears well, in no acute distress.   HEENT:  Within normal limits.    NECK:  Supple, no lymphadenopathy.   CARDIOVASCULAR:  Regular rate and rhythm, normal S1, S2, no murmurs.   LUNGS:  Clear bilaterally.   ABDOMEN:  Soft, nontender, nondistended.   EXTREMITIES:  Negative for edema, cyanosis or clubbing.   NEUROLOGIC:  A&O x3 with no focal deficits.      LABORATORY DATA:  Coronary angiogram performed by Dr. Velez on 01/24 demonstrated diffuse 3-vessel coronary artery disease with 75% proximal LAD disease, 90% mid-LAD disease and mild proximal circumflex disease, 95% proximal RCA and mid RCA disease, normal right heart catheterization.  His last echocardiogram from 08/21/2019 demonstrated mildly diminished LVEF of 45%-50% with mild global hypokinesia of the left ventricle with no valvular disease.      IMPRESSION AND PLAN:  Mr. Reid is a very pleasant 59-year-old gentleman with type 2 diabetes, chronic renal insufficiency and mild ischemic cardiomyopathy with severe diffuse 2-vessel coronary artery disease involving the LAD and the RCA.  My recommendation is coronary artery bypass grafting.  I went over the angiographic findings with the patient in detail and the diagnosis of severe multivessel coronary artery disease and ischemic cardiomyopathy in the setting of diabetes and recommendations of surgical revascularization.  I went over the indications, the risks and the benefits of the operation and potential complications associated with the surgery.  These complications include but are not strictly limited to infection, bleeding, stroke, postop MI, postop arrhythmias, pulmonary or renal complications.  I think overall he is an excellent surgical candidate and I quoted an operative mortality risk of around 1%.  The patient understands and agrees to proceed.  The patient will be taken to the operating room this month for coronary bypass grafting with endoscopic vein harvest.      It was a pleasure to meet Mr. Reid today.         MELINA TAVARES MD             D: 02/03/2020    T: 2020   MT: filiberto      Name:     KATLYN RAMIREZ   MRN:      [id number]        Account:      [id number]   :      1960           Service Date: 2020      Document: [id number]

## 2020-02-03 NOTE — PROGRESS NOTES
Met with patient and his wife in consultation. Pre op instructions given, Pre op imaging and labs done and reviewed. Right groin angiogram site C/D/I no bruit or hematoma. Post angio labs to be done today. Plan CABG 2/19 0720.

## 2020-02-03 NOTE — LETTER
2/3/2020       RE: Jose Reid  [address]  [facility] [address]     Dear Colleague,    Thank you for referring your patient, Jose Reid, to the [facility] CARDIOTHOR[facility] at [facility]. Please see a copy of my visit note below.    CV Surgery    Patient seen, clinic note dictated #[id number].    Doris Lu MD    Service Date: 02/03/2020      CLINIC CONSULTATION REPORT      REFERRING CARDIOLOGIST:  Guzman San MD      REASON FOR CONSULTATION:  Evaluation for coronary artery bypass grafting.      HISTORY OF PRESENT ILLNESS:  Mr. Reid is a very pleasant 59-year-old gentleman with insulin-dependent type 2 diabetes and a history of decompensated congestive heart failure within the last year with a mildly diminished LV function.  Extensive workup led to a coronary angiogram that demonstrated severe diffuse 3-vessel coronary artery disease.  The patient was referred to our surgical clinic to be evaluated for possible coronary artery bypass grafting.  He also has chronic renal insufficiency with a baseline creatinine level of around 1.4.      PAST MEDICAL HISTORY:  Significant for MSSA back abscess that was I&D'd and treated with antibiotics last summer, type 2 diabetes with poorly controlled blood sugar levels with A1c level as high as 15 within the last year, dyslipidemia, hypertension, peripheral neuropathy and depression.      PAST SURGICAL HISTORY:  Appendectomy and I&D of a back abscess in 07/2019.      FAMILY HISTORY:  Significant for diabetes and stroke.      SOCIAL HISTORY:  He is .  He is a  but is currently on disability.  Has never smoked, does not drink alcohol.      ALLERGIES:  Exenatide, HMG-CoA reductase inhibitors, amlodipine, hydrochlorothiazide.      CURRENT OUTPATIENT MEDICATIONS:  Reviewed in Epic chart.      REVIEW OF SYSTEMS:  As per HPI.  The patient admits to having worsening fatigue and decreased exercise tolerance and dyspnea  with exertion.  He denies any chest pain.  All other 10-point review of systems are completed and were otherwise negative unless stated above.      PHYSICAL EXAMINATION:   VITAL SIGNS:  Blood pressure is 138/82, pulse is 82.  He is 97 kg, 5 feet 11 inches, satting 97% on room air.   GENERAL:  He appears well, in no acute distress.   HEENT:  Within normal limits.   NECK:  Supple, no lymphadenopathy.   CARDIOVASCULAR:  Regular rate and rhythm, normal S1, S2, no murmurs.   LUNGS:  Clear bilaterally.   ABDOMEN:  Soft, nontender, nondistended.   EXTREMITIES:  Negative for edema, cyanosis or clubbing.   NEUROLOGIC:  A&O x3 with no focal deficits.      LABORATORY DATA:  Coronary angiogram performed by Dr. Velez on 01/24 demonstrated diffuse 3-vessel coronary artery disease with 75% proximal LAD disease, 90% mid-LAD disease and mild proximal circumflex disease, 95% proximal RCA and mid RCA disease, normal right heart catheterization.  His last echocardiogram from 08/21/2019 demonstrated mildly diminished LVEF of 45%-50% with mild global hypokinesia of the left ventricle with no valvular disease.      IMPRESSION AND PLAN:  Mr. Reid is a very pleasant 59-year-old gentleman with type 2 diabetes, chronic renal insufficiency and mild ischemic cardiomyopathy with severe diffuse 2-vessel coronary artery disease involving the LAD and the RCA.  My recommendation is coronary artery bypass grafting.  I went over the angiographic findings with the patient in detail and the diagnosis of severe multivessel coronary artery disease and ischemic cardiomyopathy in the setting of diabetes and recommendations of surgical revascularization.  I went over the indications, the risks and the benefits of the operation and potential complications associated with the surgery.  These complications include but are not strictly limited to infection, bleeding, stroke, postop MI, postop arrhythmias, pulmonary or renal complications.  I think overall he  is an excellent surgical candidate and I quoted an operative mortality risk of around 1%.  The patient understands and agrees to proceed.  The patient will be taken to the operating room this month for coronary bypass grafting with endoscopic vein harvest.      It was a pleasure to meet Mr. Reid today.         DORIS TAVARES MD             D: 2020   T: 2020   MT: filiberto      Name:     KATLYN REID   MRN:      [id number]        Account:      [id number]   :      1960           Service Date: 2020      Document: [id number]       Again, thank you for allowing me to participate in the care of your patient.      Sincerely,    Doris Tavares MD

## 2020-02-04 NOTE — PROGRESS NOTES
[facility]  Notes recorded by Ludmila Soares PA-C on 2/3/2020 at 3:37 PM CST  Hi, this patient saw Dr. Lu in clinic today. Note is dictated so I am unable to see what changes were made based on this. Can you please find out and let me know? If no changes were made to his medication regimen, will need to hold spironolactone and reduce torsemide to 20 mg daily; start hydralazine 10 mg TID. LVEDP was 3 on cath (dry). He should follow up in clinic in one - two weeks with repeat lab for reassessment. Thank you.      Called pt and reviewed BMP results from 2/3. Pt had labs drawn after visit with Dr. Lu, but he did see results on MyChart. Reviewed recommendations to HOLD Spironolactone, decrease Torsemide to 20mg daily, and start Hydralazine 10mg TID. Reviewed recommendation for follow up labs and clinic visit in 1-2 weeks. Pt stated understanding. Pt states he is scheduled for surgery on 2/19. Pt can make anytime work that OLIVIA Pimentel would like to see him, but wants her to know surgery scheduled for 219, and verify timing of when to see her given that information. Rx for Hydralazine sent to pharmacy. Med list updated. Orders placed. Message OLIVIA Pimentel to clarify timing of follow up given he is scheduled for surgery 2/19. ROGER Mcknight 10:16 AM 02/04/20

## 2020-02-04 NOTE — PROGRESS NOTES
Called pt, reviewed recommendation for 1 week follow up. Pt stated understanding. Scheduled for CORE follow up with labs on 2/10/20. ROGER Mcknight 11:00 AM 02/04/20

## 2020-02-10 NOTE — NURSING NOTE
Reminder sent to CORE RN board to call pt for update 2/13 or 2/14 per Ludmila Soares PAC request.    Krystyna Sylvester RN BSN   9:08 AM 02/10/20

## 2020-02-10 NOTE — PATIENT INSTRUCTIONS
Today, we discussed the following:   - Results: The kidney function looks much better today.   - Medication changes:  Increase the hydralazine to 25 mg three times daily (6 hours apart) - sent new dose to pharmacy.   - Follow up: My RN will call you on Friday regarding your blood pressures and hopefully will also have an update on the injectable cholesterol meds at that time.    Call me in the meantime if your weight continues to rise rapidly (1 lb/day).   If all goes well, I will plan to see you about a month post-operatively.     Please, remember to continue the followin.  Weigh yourself daily. Call if your weight is up > than 2 pounds in one day, or 5 pounds in one week; if you feel more short of breath or have worsening swelling in your legs or abdomen.  2.  Eat a low sodium diet (less than 2,000mg or 2g daily). If you eat less salt, you will retain less fluid.   3.  Avoid alcohol, as this can worsen heart failure.   4.  Avoid NSAIDs as able (For example, Ibuprofen / aleve / advil / naprosen / diclofenac).    Please call CORE nurse for any questions or concerns Mon-Fri 8am-4pm:   [phone]  For concerns after hours:   [phone]   Scheduling phone number:   [phone]    Thank you for visiting with us today.   Ludmila Soares PA-C  ______________________________________________________________

## 2020-02-10 NOTE — PROGRESS NOTES
Cardiology Clinic Progress Note    Jose Reid MRN# [id number]   YOB: 1960 Age: 59 year old   Primary cardiologist: Dr. San         Assessment and Plan:     In summary, Jose Reid presents today for a f/u CORE clinic visit.     1. YORDY / CKD-III - In setting of pre-renal azotemia (LVEDP of 3 on cath) - rapid improvement with cessation of Paul and reduction of torsemide on 2/5/20. Creat 1.9 --> 1.3 (Baseline ~ 1.1).    2. CAD, severe 2v disease, CABG scheduled 2/19/20 - exertional dyspnea = anginal equivalent.     3. Hx HFmrEF - hypertensive etiology, resolved.    - remains on Losartan 25, Coreg 25 BID, Torsemide 20 daily.    - Dry wt ~ 215-220 lbs. Today 218 lbs.     4. Hypertension - uncontrolled.     5. Hyperlipidemia -  on zetia, unable to tolerate statin therapy.     6. Multiple medication intolerances - tolerating current meds.     7. Hypoalbuminemia, deconditioning    8. Uncontrolled DMII      Plan:  - Increase hydralazine to 25 mg TID.   - My RN will call on Friday to see how ambulatory blood pressures and weights are doing.    If his weight continues to climb, will need to increase torsemide to 30 mg daily.   - Will start PCSK-9 inhibitor to obtain an LDL of <70.     Follow up:  - Me one month post-surgery.         History of Presenting Illness:      Jose Reid is a pleasant 59 year old patient who presents today for a f/u CORE clinic visit.    The patient has a history of the following -   # HFmrEF, EF 45-50% in August 2019 ECHO, normalized on cMRI 9/2019  # Small area of fibrosis at RV insertion point inferiorly on cardiac MRI, nonspecific  # HTN  # Hyperlipidemia, statin intolerance, now on zetia  # IDDM, uncontrolled with an a1c of 10.4  # YORDY / CKD, creatinine up to 2.5 in 7/2019 while admitted with back abscess and uncontrolled diabetes, on vancomycin, lisinopril/hctz (improved with med cessation) - baseline ~1.1  # Peripheral neuropathy with bilateral foot drop -  "wears leg braces, following with neurology  # Back abscess, wound care following  # Social - lives at home with wife Kassie. He works as a  in hospitals including [employer] but is seeking short-term disability.    I have been following Jose for HFmrEF since his diagnosis in August 2019. Please see my note from September for details.     In summary, following BP control, diuresis and medication optimization, Jose's LVEF recovered, and he experienced symptomatic improvement but not resolution. Due to persistent TRUJILLO, a R/LHC was ultimately recommended. This took place on 1/24/20 and revealed severe prox-mid LAD and subtotal prox-mid RCA disease. This is despite a Lexiscan MPI showing only infarct, and cardiac MRI showing no evidence for ischemia. Given his uncontrolled diabetes, CABG was felt more appropriate to treat his coronary disease, and he was referred to Dr. Lu whom he met on 2/3.  On his labs that day, he had YORDY with a creat up to 1.9, BUN of 59, and a potassium of 5.3. His LVEDP on cath had been low at 3. I stopped Spironolactone 25 daily and reduced Torsemide from 40 to 20 mg daily. For additional BP control, I added hydralazine 10 mg TID.     Today, he presents for reassessment with his wife. His weight is up 4 lbs over the past 5 days. BP is elevated. Creatinine is down to 1.35, K+ has normalized. He feels quite well - able to walk further without fatigue and dyspnea since the medication changes, he states.   Surgery is scheduled for 2/19.         Review of Systems:     12-pt ROS is negative except for as noted in the HPI.          Physical Exam:     Vitals: BP (!) 152/91   Pulse 85   Ht 1.803 m (5' 10.98\")   Wt 98.9 kg (218 lb)   BMI 30.42 kg/m    Wt Readings from Last 10 Encounters:   02/10/20 98.9 kg (218 lb)   02/03/20 97.2 kg (214 lb 3.2 oz)   01/24/20 95.3 kg (210 lb)   01/02/20 95.8 kg (211 lb 1.6 oz)   11/22/19 93.7 kg (206 lb 9.6 oz)   11/07/19 93.5 kg (206 lb 3.2 oz)   10/29/19 92.1 " kg (203 lb)   10/03/19 91.2 kg (201 lb)   09/24/19 91.4 kg (201 lb 8 oz)   09/18/19 88.9 kg (196 lb)       Constitutional:  Patient is pleasant, alert, cooperative, and in NAD.  HEENT:  NCAT. PERRLA. EOM's intact.   Neck:  CVP appears normal. No carotid bruits.   Pulmonary: Normal respiratory effort. CTAB.  Cardiac: RRR, normal S1/S2, no S3/S4, grade 2/6 sm at the LSB.   Abdomen:  Non-tender abdomen, no hepatosplenomegaly appreciated.   Vascular: Pulses in the upper and lower extremities are 2+ and equal bilaterally.  Extremities: No edema.  Skin:  No rashes or lesions appreciated.   Neurological:  No gross motor or sensory deficits.   Psych: Appropriate affect.        Data:     Labs reviewed:  Recent Labs   Lab Test 11/22/19  0947 10/29/19  1201 08/29/19  0825 08/23/19  0558 03/01/19  1050 07/03/12  1938   *  --   --   --  190* 82   HDL 42  --   --   --  41 25*   NHDL 176*  --   --   --  244*  --    CHOL 218*  --   --   --  285*  --    TRIG 294*  --   --   --  272* 1583*   TSH  --  2.73  --  4.34* 2.12  --    NTBNP 444*  --  573*  --   --   --        Lab Results   Component Value Date    WBC 10.5 01/24/2020    RBC 4.47 01/24/2020    HGB 13.7 01/24/2020    HCT 40.9 01/24/2020    MCV 92 01/24/2020    MCH 30.6 01/24/2020    MCHC 33.5 01/24/2020    RDW 13.0 01/24/2020     01/24/2020       Lab Results   Component Value Date     02/03/2020    POTASSIUM 5.3 (H) 02/03/2020    CHLORIDE 98 02/03/2020    CO2 32 (H) 02/03/2020    ANIONGAP 11.3 02/03/2020     (H) 02/03/2020    BUN 59 (H) 02/03/2020    CR 1.91 (H) 02/03/2020    GFRESTIMATED 36 (L) 02/03/2020    GFRESTBLACK 44 (L) 02/03/2020    SUE 9.2 02/03/2020      Lab Results   Component Value Date    AST 13 01/24/2020    ALT 34 01/24/2020       Lab Results   Component Value Date    A1C 11.8 (H) 01/24/2020       Lab Results   Component Value Date    INR 0.96 01/24/2020           Problem List:     Patient Active Problem List   Diagnosis     Type 2  diabetes, HbA1c goal < 7% (H)     Hyperlipidemia with target LDL less than 100     Obesity     Back abscess     HTN (hypertension)     CHF exacerbation (H)     Diabetic polyneuropathy associated with type 2 diabetes mellitus (H)     Congestive heart failure, unspecified HF chronicity, unspecified heart failure type (H)     Acute heart failure with preserved ejection fraction (H)     Status post coronary angiogram     CAD (coronary artery disease)           Medications:     Current Outpatient Medications   Medication Sig Dispense Refill     alpha-lipoic acid 100 MG capsule Take 600 mg by mouth daily       aspirin 81 MG EC tablet Take 81 mg by mouth At Bedtime  90 tablet 3     carvedilol (COREG) 25 MG tablet Take 1 tablet (25 mg) by mouth 2 times daily (with meals) 60 tablet 11     cyanocobalamin (CYANOCOBALAMIN) 1000 MCG/ML injection Inject 1,000 mcg into the muscle every 3 days        ezetimibe (ZETIA) 10 MG tablet Take 1 tablet (10 mg) by mouth At Bedtime 30 tablet 11     hydrALAZINE (APRESOLINE) 10 MG tablet Take 1 tablet (10 mg) by mouth 3 times daily 90 tablet 3     insulin aspart (NOVOLOG PEN) 100 UNIT/ML pen Pre-Meal 140-189 =1 unit,190-239 =2 units,240-289 =3 units,290-339 =4 units,= or >340 =5 units; Max 15 units/day 27 mL 1     LANTUS SOLOSTAR 100 UNIT/ML soln INJECT 40 UNITS SUBCUTANEOUSLY AT BEDTIME 18 mL 0     losartan (COZAAR) 50 MG tablet Take 0.5 tablets (25 mg) by mouth daily (with dinner) 15 tablet 11     torsemide (DEMADEX) 20 MG tablet Take 1 tablet (20 mg) by mouth daily 60 tablet 5     vitamin B complex with vitamin C (STRESS TAB) tablet Take 1 tablet by mouth At Bedtime       Vitamin D, Cholecalciferol, 1000 units TABS Take 2 tablets by mouth At Bedtime strength unknown        order for DME Equipment being ordered: Bilateral wrist splints. 1 each 0     order for DME Equipment being ordered: Bilateral ulnar braces and ulnar pads. 1 each 0     order for DME Equipment being ordered: bilateral  AFO's. 1 each 0           Past Medical History:     Past Medical History:   Diagnosis Date     Abscess     MSSA back abscess     CAD (coronary artery disease)     1/24 Angio: multivessel disease>>>scheduled for CABG 2/19/20     Diabetes (H)      Dyslipidemia      Erectile dysfunction      Hypertension      Peripheral neuropathy      Seasonal depression (H)      Past Surgical History:   Procedure Laterality Date     APPENDECTOMY       CV HEART CATHETERIZATION WITH POSSIBLE INTERVENTION N/A 1/24/2020    Procedure: Heart Catheterization with Possible Intervention;  Surgeon: Chyna Velez MD;  Location:  HEART CARDIAC CATH LAB     CV LEFT HEART CATH N/A 1/24/2020    Procedure: Left Heart Cath;  Surgeon: Chyna Velez MD;  Location:  HEART CARDIAC CATH LAB     CV RIGHT HEART CATH N/A 1/24/2020    Procedure: Right Heart Cath;  Surgeon: Chyna Velez MD;  Location:  HEART CARDIAC CATH LAB     INCISION AND DRAINAGE, ABSCESS, SIMPLE N/A 7/19/2019    Procedure: INCISION AND DRAINAGE OF BACK ABSCESS;  Surgeon: Richard Lopez MD;  Location:  GI     Family History   Problem Relation Age of Onset     Hypertension Maternal Grandmother      Diabetes Maternal Grandmother      Family History Negative Mother      Unknown/Adopted Father      Diabetes Paternal Grandmother      Cerebrovascular Disease Paternal Grandmother      Family History Negative Brother      Family History Negative Sister      Family History Negative Son      Family History Negative Daughter      Social History     Socioeconomic History     Marital status:      Spouse name: Not on file     Number of children: Not on file     Years of education: Not on file     Highest education level: Not on file   Occupational History     Not on file   Social Needs     Financial resource strain: Not on file     Food insecurity:     Worry: Not on file     Inability: Not on file     Transportation needs:     Medical: Not on file      Non-medical: Not on file   Tobacco Use     Smoking status: Never Smoker     Smokeless tobacco: Never Used   Substance and Sexual Activity     Alcohol use: No     Frequency: Never     Drug use: No     Sexual activity: Not on file   Lifestyle     Physical activity:     Days per week: Not on file     Minutes per session: Not on file     Stress: Not on file   Relationships     Social connections:     Talks on phone: Not on file     Gets together: Not on file     Attends Christian service: Not on file     Active member of club or organization: Not on file     Attends meetings of clubs or organizations: Not on file     Relationship status: Not on file     Intimate partner violence:     Fear of current or ex partner: Not on file     Emotionally abused: Not on file     Physically abused: Not on file     Forced sexual activity: Not on file   Other Topics Concern     Parent/sibling w/ CABG, MI or angioplasty before 65F 55M? Not Asked   Social History Narrative     Not on file           Allergies:   Exenatide; Hmg-coa-r inhibitors; Amlodipine; and Hctz [hydrochlorothiazide]      Ludmila Soares PA-C, QI  [facility]  Pager: [phone]

## 2020-02-10 NOTE — PROGRESS NOTES
Per QI Pimentel, pt does not tolerate statin or zetia so Rx entered for PCSK9 Inhibitor, Repatha, for Specialty Pharmacy Clinical Liaison to determine insurance coverage.    ROGER Diaz 3:58 PM 2/10/2020

## 2020-02-11 NOTE — TELEPHONE ENCOUNTER
PA Initiation    Medication: PCSK9-Inhibitor (Repatha)  Insurance Company: [employer] - Phone [phone] Fax [phone]  Pharmacy Filling the Rx: [facility] - [place], [place] - [facility] [address]  Filling Pharmacy Phone: [phone]  Filling Pharmacy Fax: [phone]  Start Date: 2/11/2020

## 2020-02-13 NOTE — TELEPHONE ENCOUNTER
Prior Authorization Approval    Authorization Effective Date: 1/12/2020  Authorization Expiration Date: 8/12/2020  Medication: [id number] (Repatha)  Approved Dose/Quantity: ud  Reference #: [id number]   Insurance Company: [employer] - Phone [phone] Fax [phone]  Expected CoPay: $15 with copay card for 3 months     CoPay Card Available: Yes    Foundation Assistance Needed:    Which Pharmacy is filling the prescription (Not needed for infusion/clinic administered): [facility] - [place], [place] - [facility] [address]  Pharmacy Notified: Yes  Patient Notified: Yes

## 2020-02-13 NOTE — PROGRESS NOTES
Outpatient Physical Therapy Discharge Note     Patient: Jose Reid  : 1960    Beginning/End Dates of Reporting Period:  10/11/19 to 2020    Referring Provider: Dr. Lentz    Therapy Diagnosis: Impaired strength, balance and gait     Client Self Report: Having a CABG x 3 on 20.     Objective Measurements:  Objective Measure: mCTSIB  Details: 30;3;15;3    Objective Measure: Sit to stand  Details: 11 reps    Objective Measure: 25 ft timed walk  Details: 8.00, 13 steps        Goals:  Goal Identifier 25' walk   Goal Description With LRAD, the pt will reduce time on 15' walk to under 7.5 seconds, improving ability to ambulate in the community   Target Date 20   Date Met      Progress: Not met, improved to 8 seconds     Goal Identifier Sit to stand   Goal Description Due to improvements in funtional strenght the pt will be able to stand from standard chair with minimal assist fro UEs, improving ability to transfer from a variety of surfaces.   Target Date 20   Date Met      Progress:  Continues to require moderate support from UEs from standard chair.      Goal Identifier mCTSIB   Goal Description The pt will hold for 30 seconds on conditions 1 and 4 of mCTSIB, improving ability to maintain static balance in all environments   Target Date 20   Date Met      Progress: Not met       Progress Toward Goals:   Progress this reporting period: Jose has made moderate progress toward improving balance and mobility. He has not had any recent falls. He is demonstrating improved gait pattern with B AFOs. He has been somewhat limited by activity tolerance, is having an upcoming cardiac procedure.     Plan:  Discharge from therapy.    Discharge:    Reason for Discharge: Undergoing CABG        Discharge Plan: Patient to continue home program.

## 2020-02-14 NOTE — PROGRESS NOTES
[facility]  HF follow up phone assessment    Situation/Background:     Called pt for wt/BP update per Ludmila Soares PA-C after hydralazine increase 2/10/20 (BP at visit 152/91). Per note, if wts continue to increase, consider torsemide increase (current dose 20 mg daily).     Assessment:    Wt today 214# on home scale, reflecting 1# gain from OV on Monday. Endorses progressively increasing abdominal swelling. Denies SOB, BLE edema.     BP as follows:     2/14 168/100   2/13 170/104   2/12 140/90  2/11 146/92    Recommendations:     BP readings are right away upon waking. Suggested take BP 90 minutes after AM meds moving forward. Took meds at 0800 today and checked BP over the phone with writer which was 137/90, pulse 82.     Will route to Ludmila for review and advisement.     Pt also notes that Repatha will be delivered on 2/18 but that he is scheduled to have CAB 2/19. Expressed concern about starting a new med one day prior to surgery. Advised pt OK to hold off one day on starting med. Advised he discuss with Dr. Lu when he sees him. Pt agrees with plan.     Nancy Wei, BSN, RN, PHN, HNB-BC   2/14/2020 at 9:12 AM

## 2020-02-14 NOTE — PROGRESS NOTES
Discussed with pt. Will increase Coreg to 50 mg BID. Will call 2/17/20 for BP update. Reminder to RN board.     Nancy Wei BSN, RN, PHN, HNB-BC   2/14/2020 at 1:53 PM

## 2020-02-17 NOTE — PROGRESS NOTES
[facility]    Called pt for update - BP today 120/78. Recommend continue current regimen. Pt wanted to confirm that hydralazine is not a blood thinner as he was instructed no blood thinners prior to CAB. Confirmed with pt that hydralazine is NOT a blood thinner.     Wished him well with his surgery. He will call with questions.    LIZZY TannerN, RN, PHN, HNB-BC   2/17/2020 at 11:25 AM

## 2020-02-17 NOTE — PROGRESS NOTES
RN called pt regarding: Repatha PA approval. Discussed that copay card can be used and cost will be $5 for one month supply.  Pt states he spoke with [facility] already and Repatha delivery is scheduled for tomorrow. Pt states he is comfortable giving himself injections, as he already gives himself insulin injections daily.   Reviewed  [email or web address] website and adivised pt to call if any questions/concerns arise.     FLP/ALT order entered for ~ 8weeks.    ROGER Diaz 4:04 PM 2/17/2020

## 2020-02-18 NOTE — ANESTHESIA PREPROCEDURE EVALUATION
Anesthesia Pre-Procedure Evaluation    Patient: Jose Reid   MRN: [id number] : 1960          Preoperative Diagnosis: CAD (coronary artery disease) [I25.10]    Procedure(s):  CORONARY ARTERY BYPASS GRAFTING    Past Medical History:   Diagnosis Date     Abscess     MSSA back abscess     CAD (coronary artery disease)      Angio: multivessel disease>>>scheduled for CABG 20     Diabetes (H)      Dyslipidemia      Erectile dysfunction      Hypertension      Peripheral neuropathy      Seasonal depression (H)      Past Surgical History:   Procedure Laterality Date     APPENDECTOMY       CV HEART CATHETERIZATION WITH POSSIBLE INTERVENTION N/A 2020    Procedure: Heart Catheterization with Possible Intervention;  Surgeon: Chyna Velez MD;  Location:  HEART CARDIAC CATH LAB     CV LEFT HEART CATH N/A 2020    Procedure: Left Heart Cath;  Surgeon: Chyna Velez MD;  Location:  HEART CARDIAC CATH LAB     CV RIGHT HEART CATH N/A 2020    Procedure: Right Heart Cath;  Surgeon: Chyna Velez MD;  Location:  HEART CARDIAC CATH LAB     INCISION AND DRAINAGE, ABSCESS, SIMPLE N/A 2019    Procedure: INCISION AND DRAINAGE OF BACK ABSCESS;  Surgeon: Richard Lopez MD;  Location:  GI     Allergies   Allergen Reactions     Exenatide Rash     Hmg-Coa-R Inhibitors Muscle Pain (Myalgia) and Other (See Comments)     Extreme Fatigue       Amlodipine      Other reaction(s): Dizziness  Dizzy       Hctz [Hydrochlorothiazide]      Prior to Admission medications    Medication Sig Start Date End Date Taking? Authorizing Provider   alpha-lipoic acid 100 MG capsule Take 600 mg by mouth daily    Reported, Patient   aspirin 81 MG EC tablet Take 81 mg by mouth At Bedtime  13   Arnie Walker MD   carvedilol (COREG) 25 MG tablet Take 1 tablet (25 mg) by mouth 2 times daily (with meals) 19   Ludmila Soares PA-C   cyanocobalamin (CYANOCOBALAMIN)  1000 MCG/ML injection Inject 1,000 mcg into the muscle every 3 days     Unknown, Entered By History   evolocumab (REPATHA) 140 MG/ML prefilled autoinjector Inject 1 mL (140 mg) Subcutaneous every 14 days 2/10/20   Ludmila Soares PA-C   ezetimibe (ZETIA) 10 MG tablet Take 1 tablet (10 mg) by mouth At Bedtime 9/30/19   Ludmila Soares PA-C   hydrALAZINE (APRESOLINE) 25 MG tablet Take 1 tablet (25 mg) by mouth 3 times daily 2/10/20   Ludmila Soares PA-C   insulin aspart (NOVOLOG PEN) 100 UNIT/ML pen Pre-Meal 140-189 =1 unit,190-239 =2 units,240-289 =3 units,290-339 =4 units,= or >340 =5 units; Max 15 units/day 10/3/19   Marcial Larry PA-C   LANTUS SOLOSTAR 100 UNIT/ML soln INJECT 40 UNITS SUBCUTANEOUSLY AT BEDTIME 1/23/20   Marcial Larry PA-C   losartan (COZAAR) 50 MG tablet Take 0.5 tablets (25 mg) by mouth daily (with dinner) 9/24/19   Ludmila Soares PA-C   order for DME Equipment being ordered: Bilateral wrist splints. 10/29/19   Xu Lentz MD   order for DME Equipment being ordered: Bilateral ulnar braces and ulnar pads. 10/29/19   Xu Lentz MD   order for DME Equipment being ordered: bilateral AFO's. 10/11/19   Xu Lentz MD   torsemide (DEMADEX) 20 MG tablet Take 1 tablet (20 mg) by mouth daily 2/4/20   Ludmila Soares PA-C   vitamin B complex with vitamin C (STRESS TAB) tablet Take 1 tablet by mouth At Bedtime    Unknown, Entered By History   Vitamin D, Cholecalciferol, 1000 units TABS Take 2 tablets by mouth At Bedtime strength unknown     Unknown, Entered By History   CATH 2020;    Prox LAD lesion is 75% stenosed.    Mid LAD lesion is 90% stenosed.    Dist LAD lesion is 50% stenosed.    Prox Cx lesion is 20% stenosed.    Prox RCA lesion is 95% stenosed.    Mid RCA lesion is 95% stenosed.    Dist RCA lesion is 40% stenosed.        Multivessel coronary artery disease noted in the LAD and  RCA.  Normal LVEDP  Preserved LV systolic function by noninvasive testing  Normal right heart cath     ECG 2020: Sinus rhythm  T-wave abnormality  Abnormal ECG  When compared with ECG of 09-SEP-2019 16:46,  QRS axis Shifted left  Nonspecific T wave abnormality no longer evident in Inferior leads    ECHO 2/2019:Interpretation Summary     1. Left ventricular systolic function is mildly reduced. The visual ejection  fraction is estimated at 45-50%.  2. There is mild global hypokinesia of the left ventricle.  3. There is mild concentric left ventricular hypertrophy.  4. The right ventricle is normal in structure, function and size.  5. No evidence for significant valvular pathology.     Left Ventricle  The left ventricle is normal in size. There is mild concentric left  ventricular hypertrophy. The visual ejection fraction is estimated at 45-50%.  Left ventricular systolic function is mildly reduced. Left ventricular  diastolic function is indeterminate. There is mild global hypokinesia of the  left ventricle.     Right Ventricle  The right ventricle is normal in structure, function and size.     Atria  Normal left atrial size. Right atrial size is normal.     Mitral Valve  There is mild mitral annular calcification. There is trace mitral  regurgitation.        Tricuspid Valve  The tricuspid valve is normal in structure and function. There is trace  tricuspid regurgitation.     Aortic Valve  The aortic valve is trileaflet with aortic valve sclerosis.     Pulmonic Valve  The pulmonic valve is normal in structure and function.     Vessels  Normal size aorta. IVC diameter >2.1 cm collapsing <50% with sniff suggests a  high RA pressure estimated at 15 mmHg or greater.     Pericardium  There is no pericardial effusion.        Rhythm  Sinus rhythm was noted.     Carotid US 2020: IMPRESSION:  Less than 50% diameter stenosis of both internal carotid  arteries relative to the distal ICA diameters.    Anesthesia Evaluation     .              ROS/MED HX    ENT/Pulmonary:      (-) tobacco use, asthma, COPD and sleep apnea   Neurologic:     (+)neuropathy - bilateral foot drop, requiring braces,    (-) seizures, CVA and migraines   Cardiovascular:     (+) Dyslipidemia, hypertension--CAD, --. : . CHF . . :. .      (-) arrhythmias and valvular problems/murmurs   METS/Exercise Tolerance:  >4 METS   Hematologic:        (-) history of blood clots, anemia and other hematologic disorder   Musculoskeletal:        (-) arthritis   GI/Hepatic:        (-) GERD and liver disease   Renal/Genitourinary:     (+) chronic renal disease, type: CRI,    (-) nephrolithiasis   Endo:     (+) type II DM Last HgA1c: 11.8 Using insulin Obesity, .   (-) Type I DM   Psychiatric:        (-) psychiatric history   Infectious Disease:        (-) Recent Fever   Malignancy:         Other:                          Physical Exam  Normal systems: cardiovascular and pulmonary    Airway   Mallampati: II  TM distance: >3 FB  Neck ROM: full    Dental   (+) chipped    Cardiovascular   Rhythm and rate: regular and normal      Pulmonary    breath sounds clear to auscultation            Lab Results   Component Value Date    WBC 10.5 01/24/2020    HGB 13.7 01/24/2020    HCT 40.9 01/24/2020     01/24/2020    SED 11 10/29/2019     02/10/2020    POTASSIUM 4.5 02/10/2020    CHLORIDE 103 02/10/2020    CO2 29 02/10/2020    BUN 39 (H) 02/10/2020    CR 1.34 (H) 02/10/2020     (H) 02/10/2020    SUE 9.3 02/10/2020    PHOS 4.2 07/20/2019    MAG 2.0 08/20/2019    ALBUMIN 3.2 (L) 01/24/2020    PROTTOTAL 7.1 01/24/2020    ALT 34 01/24/2020    AST 13 01/24/2020    ALKPHOS 75 01/24/2020    BILITOTAL 0.5 01/24/2020    LIPASE 45 (L) 07/17/2019    PTT 25 01/24/2020    INR 0.96 01/24/2020    TSH 2.73 10/29/2019    T4 0.94 08/23/2019       Preop Vitals  BP Readings from Last 3 Encounters:   02/10/20 (!) 152/91   02/03/20 138/82   01/24/20 135/79    Pulse Readings from Last 3 Encounters:  "  02/10/20 85   02/03/20 82   01/24/20 79      Resp Readings from Last 3 Encounters:   01/24/20 16   08/24/19 16   07/22/19 16    SpO2 Readings from Last 3 Encounters:   02/03/20 97%   01/24/20 94%   01/02/20 94%      Temp Readings from Last 1 Encounters:   01/24/20 36.8  C (98.2  F) (Oral)    Ht Readings from Last 1 Encounters:   02/10/20 1.803 m (5' 10.98\")      Wt Readings from Last 1 Encounters:   02/10/20 98.9 kg (218 lb)    Estimated body mass index is 30.42 kg/m  as calculated from the following:    Height as of 2/10/20: 1.803 m (5' 10.98\").    Weight as of 2/10/20: 98.9 kg (218 lb).       Anesthesia Plan      History & Physical Review      ASA Status:  4 .    NPO Status:  > 8 hours    Plan for General and ETT with Propofol induction. Maintenance will be Balanced.      Additional equipment: Videolaryngoscope, Arterial Line, Central Line, CESAR and CVP Amicar, Epinephrine, phenylephrine, Nitroglycerin and Insulin infusions ready  To ICU on propofol infusion      Postoperative Care  Postoperative pain management:  Multi-modal analgesia.      Consents  Anesthetic plan, risks, benefits and alternatives discussed with:  Patient..                 Mary Canales MD  "

## 2020-02-19 PROBLEM — I25.10 CAD IN NATIVE ARTERY: Status: ACTIVE | Noted: 2020-01-01

## 2020-02-19 NOTE — ANESTHESIA PROCEDURE NOTES
CENTRAL LINE INSERTION PROCEDURE NOTE:   Pre-Procedure  Performed by Mary Canales MD  Location: pre-op      Pre-Anesthestic Checklist: patient identified, IV checked, site marked, risks and benefits discussed, informed consent, monitors and equipment checked, pre-op evaluation and at physician/surgeon's request    Timeout  Correct Patient: Yes   Correct Procedure: Yes   Correct Site: Yes   Correct Laterality: Yes   Correct Position: Yes   Site Marked: Yes   .   Procedure Documentation   Procedure: central line  Position: Trendelenburg  Patient Prep/Sterile Barriers; chlorhexidine gluconate and isopropyl alcohol      Insertion Site:internal jugular, right    Skin infiltrated with 2 mL of 2% lidocaine.  Catheter: 9 [place], 10 cm (sheath introducer)  Assessment/Narrative    Catheter: 9 [place], 10 cm (sheath introducer)     Secured by suture  Tegaderm and Biopatch dressing used.  blood aspirated from all lumens  All lumens flushed: Yes    Comments:  A time out was preformed identifying the correct procedure, the correct location with the nursing staff.  The right neck was prepped with 2% chlorhexidine and draped with a full length sterile sheet in the usual fashion.  1% lidocaine was administered subcutaneously for local anesthesia.  The right internal jugular vein  was accessed under ultrasound guidance with an 18 gauge thin wall needle, a 1.75 inch catheter was advanced over the needle and the needle was removed. Normal venous pressure was confirmed using a fluid column technique. A wire was then advanced through the catheter and catheter was then removed. A 9 Citizen of [place] cordis was advanced over the wire via the seldinger technique. Blood was withdrawn from all lumens and flushed with normal saline.  The catheter was sutured in place and a sterile dressing was applied over the site prior to removal of drapes.

## 2020-02-19 NOTE — CONSULTS
CARDIAC SURGERY NUTRITION CONSULT    Received standing order to assess and educate patient.    Will follow and complete assessment once patient is extubated and/or is transferred to medical unit.    Patient will receive nutrition education during the Outpatient Cardiac Rehab Program (nutrition classes/dietitian counseling).    Tiana Hernandes, AMRIT, LD, [facility]

## 2020-02-19 NOTE — PROGRESS NOTES
Extubation Note    Successful completion of SBT (Yes or No):Yes  Extubation time:1420    Patient assessment:  Lung sounds:Clear  Stridor Present (Yes or No): No  Patient tolerance: Pt tolerated fine    Oxygen device:  Liter flow:10  FiO2:40%  SpO2:96    Plan:RT will continue to follow    Soraida Lama RT

## 2020-02-19 NOTE — ANESTHESIA POSTPROCEDURE EVALUATION
Patient: Jose Reid    Procedure(s):  CORONARY ARTERY BYPASS GRAFTING X 2 LIMA TO LAD, SV TO PDA; ENDO VEIN HARVEST TO LEFT LEG; ON PUMP WITH CESAR    Diagnosis:CAD (coronary artery disease) [I25.10]  Diagnosis Additional Information: No value filed.    Anesthesia Type:  General, ETT    Note:  Anesthesia Post Evaluation    Patient location during evaluation: ICU  Patient participation: Unable to evaluate secondary to administered sedation  Level of consciousness: obtunded/minimal responses  Pain management: unable to assess  Airway patency: patent  Cardiovascular status: acceptable, hemodynamically stable and blood pressure returned to baseline  Respiratory status: acceptable and ETT  Hydration status: acceptable  PONV: unable to assess     Anesthetic complications: None    Comments: To ICU on 15L/min O2, ventilation adequate via ambu. Propofol for sedation, minimal vasopressor requirements with epinephrine. All vital signs stable. Report given to ICU provider and nursing staff. No complications. Reversal given in room. Patient stable.           Last vitals:  Vitals:    02/19/20 1315 02/19/20 1330 02/19/20 1345   BP:      Resp: (!) 7 10 12   Temp: 35.6  C (96.1  F)  35.9  C (96.6  F)   SpO2: 97% 97% 100%         Electronically Signed By: Mary Canales MD  February 19, 2020  2:20 PM

## 2020-02-19 NOTE — BRIEF OP NOTE
[facility]    Brief Operative Note    Pre-operative diagnosis: CAD (coronary artery disease) [I25.10]  Post-operative diagnosis Same as pre-operative diagnosis    Procedure: Procedure(s):  CORONARY ARTERY BYPASS GRAFTING X 2 LIMA TO LAD, SV TO PDA; ENDO VEIN HARVEST TO LEFT LEG; ON PUMP WITH CESAR  Surgeon: Surgeon(s) and Role:     * Doris Lu MD - Primary     * Suzi Crooks PA-C - Assisting     * Rene Carroll MD - Fellow - Assisting  Anesthesia: General   Estimated blood loss: 400 ml  Drains: 32Fr mediastinal chest tube and 32Fr left pleural chest tube  Specimens: * No specimens in log *  Findings:   LIMA to LAD, GSV to RPDA.  Complications: None.  Implants: * No implants in log *

## 2020-02-19 NOTE — PROGRESS NOTES
Medication History Completed by Medication Scribe  Admission medication history interview status for the 2/19/2020  admission is complete. See EPIC admission navigator for prior to admission medications     Medication history sources: Patient, Surescripts and H&P  Medication history source reliability: Good  Adherence assessment: N/A Not Observed    Significant changes made to the medication list:  None      Additional medication history information:   None    Medication reconciliation completed by provider prior to medication history? No    Time spent in this activity: 35 minutes      Prior to Admission medications    Medication Sig Last Dose Taking? Auth Provider   ALPHA-LIPOIC ACID PO Take 600 mg by mouth daily  more than a week Yes Reported, Patient   aspirin 81 MG EC tablet Take 81 mg by mouth daily  2/18/2020 at am Yes Arnie Walker MD   carvedilol 25 MG PO tablet Take 50 mg by mouth 2 times daily (with meals) (Takes 2 x 25mg = 50mg) 2/19/2020 at 0430 Yes Reported, Patient   cyanocobalamin (CYANOCOBALAMIN) 1000 MCG/ML injection Inject 1,000 mcg into the muscle every 3 days  more than a week Yes Unknown, Entered By History   ezetimibe (ZETIA) 10 MG tablet Take 1 tablet (10 mg) by mouth At Bedtime 2/17/2020 Yes Ludmila Soares PA-C   hydrALAZINE 10 MG PO tablet Take 20 mg by mouth 3 times daily (takes 2 x 10mg = 20mg) 2/18/2020 at pm Yes Reported, Patient   insulin aspart (NOVOLOG PEN) 100 UNIT/ML pen Pre-Meal 140-189 =1 unit,190-239 =2 units,240-289 =3 units,290-339 =4 units,= or >340 =5 units; Max 15 units/day 2/18/2020 at 1600 Yes Marcial Larry PA-C   insulin glargine 100 UNIT/ML SC vial Inject 50 Units Subcutaneous At Bedtime 25 units 2/18/2020 at pm Yes Reported, Patient   losartan (COZAAR) 50 MG tablet Take 0.5 tablets (25 mg) by mouth daily (with dinner) 2/18/2020 at pm Yes Ludmila Soares PA-C   Omega-3 Fatty Acids (FISH OIL PO) Take 1 tablet by mouth daily more than a week  Yes Reported, Patient   torsemide (DEMADEX) 20 MG tablet Take 1 tablet (20 mg) by mouth daily 2/18/2020 at am Yes Ludmila Soares PA-C   vitamin B complex with vitamin C (STRESS TAB) tablet Take 1 tablet by mouth At Bedtime more than a week Yes Unknown, Entered By History   Vitamin D, Cholecalciferol, 1000 units TABS Take 2 tablets by mouth At Bedtime  more than a week Yes Unknown, Entered By History   evolocumab (REPATHA) 140 MG/ML prefilled autoinjector Inject 1 mL (140 mg) Subcutaneous every 14 days never started  Ludmila Soares PA-C   order for DME Equipment being ordered: Bilateral wrist splints.   Xu Lentz MD   order for DME Equipment being ordered: Bilateral ulnar braces and ulnar pads.   Xu Lentz MD   order for DME Equipment being ordered: bilateral AFO's.   Xu Lentz MD

## 2020-02-19 NOTE — OP NOTE
Procedure Date: 02/19/2020      REFERRING CARDIOLOGIST:  Dr. Guzman San.      PREOPERATIVE DIAGNOSIS:  Severe multivessel coronary artery disease.      POSTOPERATIVE DIAGNOSIS:  Severe multivessel coronary artery disease.      SURGEON:  Doris Lu MD.      ASSISTANT:  Rene Carroll MD and OLIVIA Reynolds.      NAME OF OPERATION:  Coronary artery bypass grafting x2 with left internal mammary artery to the left anterior descending, reverse saphenous vein graft to the posterior descending artery, endoscopic vein harvest from the left lower extremity, intraoperative CESAR.      ANESTHESIA:  General endotracheal.      INDICATIONS FOR PROCEDURE:  Mr. Reid is a very pleasant 59-year-old diabetic gentleman who was recently found to have severe 2-vessel coronary artery disease involving the RCA and the LAD.  He was taken to the operating room today for coronary artery bypass surgery.      OPERATIVE FINDINGS:  The patient had a mildly diminished LV systolic function with EF of around 50%.  His left internal mammary artery was an excellent quality conduit with excellent flow measuring 2.5 mm in diameter.  The left greater saphenous vein was an excellent quality conduit as well, measuring 3-4 mm in diameter.  His coronary arteries were overall very diffusely calcified and diseased.  His posterior descending artery had mild to moderate disease and the probe size was 1.5 mm.  His distal LAD also was heavily calcified and significantly diseased, but the probe size was 1.5 mm.      DESCRIPTION OF PROCEDURE:  After informed consent was obtained, the patient brought down to the operating room and was placed on the OR table in a supine position.  Intravenous and intra-arterial lines were begun.  While monitoring his blood pressure and EKG tracing, he was anesthetized and intubated using a single lumen endotracheal tube.  His entire chest, abdomen, both groins and legs were prepped down to the toes using multiple layers of  drip.  He was draped in a sterile field.  Median sternotomy was performed and in the meantime, the left greater saphenous vein was harvested endoscopically.  The left internal mammary artery was taken down.  Prior to clipping the LIMA distally, the patient was fully heparinized.  The sternal edges were retracted laterally and the pericardium was opened to suspend the heart in a pericardial cradle.  The ascending aorta and the right atrial appendage were cannulated.  An antegrade needle/aortic root vent was placed in the ascending aorta as well.  After appropriate ACT level was achieved, cardiopulmonary bypass was established.  The patient was kept normothermic during the entire operation.  The aorta was then crossclamped and a liter of [place] antegrade cardioplegia was given to fully arrest the heart.  The patient went into good diastolic arrest without any LV distention.      The first coronary vessel grafted was the posterior descending artery.  Conduit used was a saphenous vein.  This anastomosis was performed in an end-to-side fashion using running 7-0 Prolene.  Next, the LIMA to LAD anastomosis was performed.  This was also done in an end-to-side fashion using running 7-0 Prolene.  This anastomosis was protected by tacking the LIMA pedicle down to the epicardium using interrupted 6-0 Prolene x2.  Warm blood antegrade hot shot was given and the aortic crossclamp was removed.  Aortic crossclamp time was 28 minutes.  Partial clamp was placed on the mid ascending aorta.  A 4-mm aortotomy was created to perform the proximal vein anastomosis in end-to-side fashion using running 6-0 Prolene.  Partial clamp was removed.  The patient was weaned off cardiopulmonary bypass with low dose epinephrine and Reymundo-Synephrine drips.  Total cardiopulmonary bypass time was 46 minutes.  Once the patient remained stable off bypass, the venous cannula was removed and protamine was given.  The aortic cannula was subsequently removed  as well.  Temporary ventricular pacing wires placed in the RV muscle.  A 32-Thai angled and straight chest tube was placed in mediastinum as well.  These were all brought out percutaneously below the sternotomy incision and secured to the skin using 2-0 Ethibond.  The right pleural space was slightly opened.  The mediastinum was irrigated with antibiotic saline and hemostasis was achieved.  The sternum was reapproximated using multiple interrupted single and double wires.  The incision was closed in layers of running Vicryl suture.  Skin was closed using 3-0 Vicryl and was sealed using Dermabond.      There were no intraoperative complications and the patient tolerated the operation well.  No blood products were given intraoperatively.  All sponge counts, needle counts and instrument counts were correct x2 at the end the operation.      ESTIMATED BLOOD LOSS:  Unknown.      SPECIMEN REMOVED:  None.      The patient was brought to the ICU in medically stable condition and remained intubated.         MELINA TAVARES MD             D: 2020   T: 2020   MT: FRANK      Name:     KATLYN RAMIREZ   MRN:      [id number]        Account:        [id number]   :      1960           Procedure Date: 2020      Document: [id number]       cc: Guzman San MD

## 2020-02-19 NOTE — PROGRESS NOTES
Spontaneous Breathing Trial    Criteria met for SBT (Y/N):Yes    Settings:    PS: 5  PEEP: 5  FiO2 30%    Measured Parameters:    RR:13  Tidal volume:939  RSBI: 24    Patient tolerance: Pt just started SBT and is tolerating fine.       Duration of SBT:   Plan: RT will continue to follow and check in half hour

## 2020-02-19 NOTE — ANESTHESIA CARE TRANSFER NOTE
Patient: Jose Reid    Procedure(s):  CORONARY ARTERY BYPASS GRAFTING X 2 LIMA TO LAD, SV TO PDA; ENDO VEIN HARVEST TO LEFT LEG; ON PUMP WITH CESAR    Diagnosis: CAD (coronary artery disease) [I25.10]  Diagnosis Additional Information: No value filed.    Anesthesia Type:   General, ETT     Note:  Airway :Face Mask and ETT  Patient transferred to:PACU  Comments: Patient connected to SpO2, EKG, and arterial blood pressure transport monitors and accompanied by CRNA, anesthesiologist, circulating RN, specialty care technician to ICU room. Patient ventilated by anesthesiologist with ambu via ETT with O2 at 10 liters per minute during transport.     On arrival to ICU, endotracheal tube position unchanged, equal, bilateral breath sounds auscultated in ICU room, patient placed on ICU ventilator by respiratory therapist, teeth and oral mucosa intact and unchanged at handoff of care. At anesthesia handoff of care, clinical monitors and alarms on and functioning, report on patient's clinical status given to ICU RN, report on patient's clinical status given to intensivist, ICU staff questions answered.Handoff Report: Identifed the Patient, Identified the Reponsible Provider, Reviewed the pertinent medical history, Discussed the surgical course, Reviewed Intra-OP anesthesia mangement and issues during anesthesia, Set expectations for post-procedure period and Allowed opportunity for questions and acknowledgement of understanding      Vitals: (Last set prior to Anesthesia Care Transfer)    CRNA VITALS  2/19/2020 1031 - 2/19/2020 1105      2/19/2020             Resp Rate (set):  10                Electronically Signed By: PRATIMA Davidson CRNA  February 19, 2020  11:05 AM

## 2020-02-19 NOTE — ANESTHESIA PROCEDURE NOTES
ARTERIAL LINE PROCEDURE NOTE:   Pre-Procedure  Performed by Mary Canales MD  Location: pre-op      Pre-Anesthestic Checklist: patient identified, IV checked, site marked, risks and benefits discussed, informed consent, monitors and equipment checked, pre-op evaluation and at physician/surgeon's request    Timeout  Correct Patient: Yes   Correct Procedure: Yes   Correct Site: Yes   Correct Laterality: Yes   Correct Position: Yes   Site Marked: Yes, N/A   .   Procedure Documentation  Procedure: arterial line    Supine  Insertion Site:left (radial).Skin infiltrated with 2 mL of 1% lidocaine. Injection technique: Seldinger Technique  .  .  Patient Prep/Sterile Barriers; all elements of maximal sterile barrier technique followed, mask, hat, sterile gown, sterile gloves, draped, hand hygiene, chlorhexidine gluconate and isopropyl alcohol    Assessment/Narrative    Catheter: 20 gauge, 1.75 in/4.5 cm quick cath (integral wire)      Tegaderm dressing used.    Arterial waveform: Yes IBP within 10% of NIBP: Yes

## 2020-02-19 NOTE — CONSULTS
[facility]  History and Physical/ Consult  Critical Care Service  Date of Admission:  2/19/2020  Date of Service (when I saw the patient): 02/19/20  Assessment & Plan   Mr. Reid is a 59-year-old male with insulin-dependent type 2 diabetes and a history of decompensated congestive heart failure within the last year with a mildly diminished LV function.  Extensive workup led to a coronary angiogram that demonstrated severe diffuse 3-vessel coronary artery disease. He also has chronic renal insufficiency with a baseline creatinine level of around 1.4.    Jose underwent a CABGx2 with Dr. Lu on 02/19, the case was uncomplicated with an EBL of 400.  He was brought to the ICU intubated and sedated on small doses of epinephrine, his anesthesia was reversed.      Neuro  #Acute pain and sedation needs post-operatively  Plan:  -- Scheduled acetaminophen and gabapentin with PRN dilaudid, robaxin and oxycodone.  -- Propofol for sedation as needed until extubation    CV  #Severe diffuse 3-Vessel coronary artery disease s/p CABx2  #Dyslipidemia  #Hypertension  Plan:  -- Per pathway  -- Cardiac meds per CV surgery    Resp:  #Post operative ventilator management  Plan:  -- AC ventilation   Ventilation Mode: SIMV/PS  (Synchronized Intermittent Mandatory Ventilation with Pressure Support)  Rate Set (breaths/minute): 16 breaths/min  Tidal Volume Set (mL): 500 mL  PEEP (cm H2O): 5 cmH2O  Pressure Support (cm H2O): 10 cmH2O  Oxygen Concentration (%): 50 %  Resp: 15  -- PST when hemodynamically stable    GI/Nutrition  No prior history  Plan:  -- NPO until after extubation  -- PPI: protonix daily    Renal  #Chronic renal insufficiency, baseline creat around 1.4  Plan:  -- Monitor function and electrolytes as needed with replacement per ICU protocols.   -- Generally avoid nephrotoxic agents such as NSAID, IV contrast unless specifically required  -- Adjust medications as needed for renal clearance  -- Follow I/O's  as appropriate.  -- Maintain euvolemia    ID  No acute issues  Plan:  -- Empiric abx per CV surgery    Endocrine  #DM type 2, poorly controlled  Plan:  -- Insulin gtt per protocol if indicated  -- Keep BG  <180 for optimal healing    Heme:  #Acute blood loss anemia  #Coagulopathy (heparin induced), reversed   Plan:  -- Monitor hemoglobin.  -- Transfuse to keep > 7.0    MSK  #Deconditioning  Plan:  -- PT/OT when appropriate    Skin  #Sternotomy   Plan:  -- Wound care per CV surgery    General cares:  DVT Prophylaxis: Pneumatic Compression Devices  GI Prophylaxis: PPI  Restraints: Restraints for medical healing needed: NO  Family update by me today: Yes      Current lines are required for patient management  Access:  ETT  Lin catheter  R internal jugular triple lumen  Left radial arterial line  Chest tubes x2    Nellie BUCIO    Time Spent on this Encounter   Billing:  I spent 35 minutes bedside and on the inpatient unit today managing the critical care of Jose Reid in relation to the issues listed in this note.    Code Status   Full Code    Primary Care Physician   REINIER PINO    Chief Complaint   See Above    History is obtained from the electronic health record    History of Present Illness   Mr. Reid is a 59-year-old male with insulin-dependent type 2 diabetes and a history of decompensated congestive heart failure within the last year with a mildly diminished LV function.  Extensive workup led to a coronary angiogram that demonstrated severe diffuse 3-vessel coronary artery disease. He also has chronic renal insufficiency with a baseline creatinine level of around 1.4.    Past Medical History    I have reviewed this patient's medical history and updated it with pertinent information if needed.   Past Medical History:   Diagnosis Date     Abscess     MSSA back abscess     CAD (coronary artery disease)     1/24 Angio: multivessel disease>>>scheduled for CABG 2/19/20     Diabetes (H)       Dyslipidemia      Erectile dysfunction      Hypertension      Peripheral neuropathy      Seasonal depression (H)        Past Surgical History   I have reviewed this patient's surgical history and updated it with pertinent information if needed.  Past Surgical History:   Procedure Laterality Date     APPENDECTOMY       CV HEART CATHETERIZATION WITH POSSIBLE INTERVENTION N/A 1/24/2020    Procedure: Heart Catheterization with Possible Intervention;  Surgeon: Chyna Velez MD;  Location:  HEART CARDIAC CATH LAB     CV LEFT HEART CATH N/A 1/24/2020    Procedure: Left Heart Cath;  Surgeon: Chyna Velez MD;  Location:  HEART CARDIAC CATH LAB     CV RIGHT HEART CATH N/A 1/24/2020    Procedure: Right Heart Cath;  Surgeon: Chyna Velez MD;  Location:  HEART CARDIAC CATH LAB     INCISION AND DRAINAGE, ABSCESS, SIMPLE N/A 7/19/2019    Procedure: INCISION AND DRAINAGE OF BACK ABSCESS;  Surgeon: Richard Lopez MD;  Location:  GI       Prior to Admission Medications   Prior to Admission Medications   Prescriptions Last Dose Informant Patient Reported? Taking?   ALPHA-LIPOIC ACID PO more than a week Self Yes Yes   Sig: Take 600 mg by mouth daily    Omega-3 Fatty Acids (FISH OIL PO) more than a week Self Yes Yes   Sig: Take 1 tablet by mouth daily   Vitamin D, Cholecalciferol, 1000 units TABS more than a week Self Yes Yes   Sig: Take 2 tablets by mouth At Bedtime    aspirin 81 MG EC tablet 2/18/2020 at am Self Yes Yes   Sig: Take 81 mg by mouth daily    carvedilol 25 MG PO tablet 2/19/2020 at 0430 Self Yes Yes   Sig: Take 50 mg by mouth 2 times daily (with meals) (Takes 2 x 25mg = 50mg)   cyanocobalamin (CYANOCOBALAMIN) 1000 MCG/ML injection more than a week Self Yes Yes   Sig: Inject 1,000 mcg into the muscle every 3 days    evolocumab (REPATHA) 140 MG/ML prefilled autoinjector never started Self No No   Sig: Inject 1 mL (140 mg) Subcutaneous every 14 days   ezetimibe (ZETIA) 10 MG  tablet 2/17/2020 Self No Yes   Sig: Take 1 tablet (10 mg) by mouth At Bedtime   hydrALAZINE 10 MG PO tablet 2/18/2020 at pm Self Yes Yes   Sig: Take 20 mg by mouth 3 times daily (takes 2 x 10mg = 20mg)   insulin aspart (NOVOLOG PEN) 100 UNIT/ML pen 2/18/2020 at 1600 Self No Yes   Sig: Pre-Meal 140-189 =1 unit,190-239 =2 units,240-289 =3 units,290-339 =4 units,= or >340 =5 units; Max 15 units/day   insulin glargine 100 UNIT/ML SC vial 25 units 2/18/2020 at pm Self Yes Yes   Sig: Inject 50 Units Subcutaneous At Bedtime   losartan (COZAAR) 50 MG tablet 2/18/2020 at pm Self No Yes   Sig: Take 0.5 tablets (25 mg) by mouth daily (with dinner)   order for DME   No No   Sig: Equipment being ordered: bilateral AFO's.   order for DME   No No   Sig: Equipment being ordered: Bilateral wrist splints.   order for DME   No No   Sig: Equipment being ordered: Bilateral ulnar braces and ulnar pads.   torsemide (DEMADEX) 20 MG tablet 2/18/2020 at am Self Yes Yes   Sig: Take 1 tablet (20 mg) by mouth daily   vitamin B complex with vitamin C (STRESS TAB) tablet more than a week Self Yes Yes   Sig: Take 1 tablet by mouth At Bedtime      Facility-Administered Medications: None     Allergies   Allergies   Allergen Reactions     Exenatide Rash     Hmg-Coa-R Inhibitors Muscle Pain (Myalgia) and Other (See Comments)     Extreme Fatigue       Amlodipine      Other reaction(s): Dizziness  Dizzy       Hctz [Hydrochlorothiazide]        Social History   I have reviewed this patient's social history and updated it with pertinent information if needed. Jose Reid  reports that he has never smoked. He has never used smokeless tobacco. He reports that he does not drink alcohol or use drugs.    Family History   I have reviewed this patient's family history and updated it with pertinent information if needed.   Family History   Problem Relation Age of Onset     Hypertension Maternal Grandmother      Diabetes Maternal Grandmother      Family History  Negative Mother      Unknown/Adopted Father      Diabetes Paternal Grandmother      Cerebrovascular Disease Paternal Grandmother      Family History Negative Brother      Family History Negative Sister      Family History Negative Son      Family History Negative Daughter        Review of Systems   Review of systems not obtained due to patient factors - intubation and sedation    Physical Exam   Temp: 98.2  F (36.8  C) Temp src: Temporal Temp  Min: 98.2  F (36.8  C)  Max: 98.2  F (36.8  C) BP: 120/78   Heart Rate: 76 Resp: 16 SpO2: 96 % O2 Device: None (Room air)    Vital Signs with Ranges  Temp:  [98.2  F (36.8  C)] 98.2  F (36.8  C)  Heart Rate:  [76] 76  Resp:  [16] 16  BP: (120)/(78) 120/78  SpO2:  [96 %] 96 %  221 lbs 0 oz    GEN: Intubated and sedated  EYES: PERRL, Anicteric sclera.   HEENT:  Normocephalic, atraumatic, trachea midline, ETT secure  CV: RRR, no gallops, rubs, or murmurs  PULM/CHEST: Clear breath sounds bilaterally without rhonchi, crackles or wheeze, symmetric chest rise  GI: normal bowel sounds, soft  : coto catheter in place, urine yellow and clear  EXTREMITIES: no peripheral edema, peripheral pulses intact  SKIN: Sternotomy   Imaging personally reviewed:  ECG and chest xray    Data   Results for orders placed or performed during the hospital encounter of 02/19/20 (from the past 24 hour(s))   Potassium   Result Value Ref Range    Potassium 3.9 3.4 - 5.3 mmol/L   Glucose   Result Value Ref Range    Glucose 103 (H) 70 - 99 mg/dL   Glucose by meter   Result Value Ref Range    Glucose 129 (H) 70 - 99 mg/dL   Glucose by meter   Result Value Ref Range    Glucose 110 (H) 70 - 99 mg/dL   Basic metabolic panel   Result Value Ref Range    Sodium 144 133 - 144 mmol/L    Potassium 4.3 3.4 - 5.3 mmol/L    Chloride 113 (H) 94 - 109 mmol/L    Carbon Dioxide 28 20 - 32 mmol/L    Anion Gap 3 3 - 14 mmol/L    Glucose 151 (H) 70 - 99 mg/dL    Urea Nitrogen 30 7 - 30 mg/dL    Creatinine 1.14 0.66 - 1.25 mg/dL     GFR Estimate 70 >60 mL/min/[1.73_m2]    GFR Estimate If Black 81 >60 mL/min/[1.73_m2]    Calcium 8.6 8.5 - 10.1 mg/dL   CBC with platelets   Result Value Ref Range    WBC 10.9 4.0 - 11.0 10e9/L    RBC Count 3.07 (L) 4.4 - 5.9 10e12/L    Hemoglobin 9.5 (L) 13.3 - 17.7 g/dL    Hematocrit 28.3 (L) 40.0 - 53.0 %    MCV 92 78 - 100 fl    MCH 30.9 26.5 - 33.0 pg    MCHC 33.6 31.5 - 36.5 g/dL    RDW 13.4 10.0 - 15.0 %    Platelet Count 115 (L) 150 - 450 10e9/L   Fibrinogen activity   Result Value Ref Range    Fibrinogen 348 200 - 420 mg/dL   INR   Result Value Ref Range    INR 1.27 (H) 0.86 - 1.14   Partial thromboplastin time   Result Value Ref Range    PTT 39 (H) 22 - 37 sec   Glucose by meter   Result Value Ref Range    Glucose 134 (H) 70 - 99 mg/dL

## 2020-02-20 NOTE — PLAN OF CARE
Discharge Planner PT   Patient plan for discharge: Per wife TCU. Educated wife and patient on recommendation for [facility]. Wife receptive.   Current status: Patient seen for initial eval and CR. Patient lives with his wife in a split level home. Patient has foot drop from peripheral neuropathy and bout of sepsis this past July made it worse per wife. He just received bilateral AFO's in November and he wears them all day. He uses a cane for amb outside the house but not typically inside the house.     Currently patient has been sitting in recliner since 2am. Transferred sit to stand from chair times 2 with max assist of 2. Tolerated standing able to 3 min but patient becoming less steady so returned to sitting. Noted BP dropped from 109/64 in sitting to 91/53 after standing. C/o dizziness and nausea with patient spitting up phlegm after sitting. Able to tolerate standing again with max assist of 2 and transferring back to the bed. Max assist of 2 and assist of another to watch lines with sit to sup. Patient left in right sidelying with nurse and wife present.     Barriers to return to prior living situation: Current level of assist, weakness, sternal precautions and patient typically uses his arms for transitions due to ankle weakness and AFO's, split level home   Recommendations for discharge: [facility]  Rationale for recommendations: Anticipate patient will need continued intense skilled PT after discharge due to baseline footdrop and current need for sternal precautions. Patient fatigued quickly today but anticipate he will work up to tolerating 3 hours of therapy a day. Patient is very motivated and wife is very involved and supportive.        Entered by: Agustina Medellin 02/20/2020 10:57 AM

## 2020-02-20 NOTE — PROGRESS NOTES
02/20/20 0946   Quick Adds   Type of Visit Initial PT Evaluation   Living Environment   Lives With spouse   Living Arrangements house   Home Accessibility no concerns;stairs to enter home;stairs within home   Number of Stairs, Within Home, Primary 7   Stair Railings, Within Home, Primary railings safe and in good condition;railing on right side (ascending)   Transportation Anticipated car, drives self;family or friend will provide   Living Environment Comment Patient was modified independent prior to admission with use of cane and bilateral AFO's (just got AFO's in  Novemver.    Self-Care   Usual Activity Tolerance moderate   Current Activity Tolerance moderate   Equipment Currently Used at Home cane, straight   Functional Level Prior   Ambulation 1-->assistive equipment  (Cane outside and AFO all day. )   Transferring 0-->independent   Toileting 0-->independent   Bathing 0-->independent   Communication 0-->understands/communicates without difficulty   Swallowing 0-->swallows foods/liquids without difficulty   Cognition 0 - no cognition issues reported   Prior Functional Level Comment Patient did 12 weeks of cardiac rehab in the fall, has been seeing a therapist specializing in neuropaty    General Information   Onset of Illness/Injury or Date of Surgery - Date 02/19/20   Referring Physician Rene Carroll   Patient/Family Goals Statement Per wife, the plan is to go to TCU. Therapist educated patient and wife on ARC.    Pertinent History of Current Problem (include personal factors and/or comorbidities that impact the POC) Mr. Reid is a 59-year-old male with insulin-dependent type 2 diabetes and a history of decompensated congestive heart failure within the last year with a mildly diminished LV function.  Extensive workup led to a coronary angiogram that demonstrated severe diffuse 3-vessel coronary artery disease. He also has chronic renal insufficiency.    Precautions/Limitations sternal precautions   General  Observations Patients wife present and very involved.    General Info Comments Patient has AFO;s here with him.    Cognitive Status Examination   Orientation orientation to person, place and time   Level of Consciousness alert   Follows Commands and Answers Questions 100% of the time   Personal Safety and Judgment intact   Memory intact   Pain Assessment   Patient Currently in Pain Yes, see Vital Sign flowsheet  (Sternal incision with bed mobility. )   Integumentary/Edema   Integumentary/Edema Comments Has wound vac to chest incision.    Posture    Posture Not impaired   Range of Motion (ROM)   ROM Comment No specific deficits noted but did not fully assess UE's due to multiple lines.    Strength   Strength Comments Bilateral DF 0/5, quads and hip extensors able to support patient in standing but not strong enough for patient to get to standing from recliner without assist of 2. UE's grossly 4/5.    Bed Mobility   Bed Mobility Comments Max assist of 2 for sit to sup and scooting up in bed. Mod assist for rolling to the right.    Transfer Skills   Transfer Comments Sit to stand from chair with max assist of 2 maintaining sternal precautions.    Gait   Gait Comments Able to take 4 steps from the bed to the chair. Too many lines to initiate amb.    Balance   Balance Comments Good sitting balance. Mod to max assist needed for standing balance even with support of ww.    Sensory Examination   Sensory Perception Comments Neuropathy bilateral feet.    General Therapy Interventions   Planned Therapy Interventions balance training;bed mobility training;gait training;transfer training;risk factor education;progressive activity/exercise   Clinical Impression   Criteria for Skilled Therapeutic Intervention yes, treatment indicated   PT Diagnosis Impaired functional independence.    Influenced by the following impairments Sternal precautions, decreased strength, pain, fatigue   Functional limitations due to impairments Needs  "assist for bed mobilitiy, transfers and currently unable to amb.    Clinical Presentation Evolving/Changing   Clinical Presentation Rationale Multiple comorbidities and BP dropping with prolonged standing.    Clinical Decision Making (Complexity) Moderate complexity   Therapy Frequency 2x/day   Predicted Duration of Therapy Intervention (days/wks) 5 days   Anticipated Equipment Needs at Discharge front wheeled walker   Anticipated Discharge Disposition Acute Rehabilitation Facility   Risk & Benefits of therapy have been explained Yes   Patient, Family & other staff in agreement with plan of care Yes   [facility] TM \"6 Clicks\"   2016, Trustees of [facility], under license to [employer].  All rights reserved.   6 Clicks Short Forms Basic Mobility Inpatient Short Form   [facility] AM-PAC  \"6 Clicks\" V.2 Basic Mobility Inpatient Short Form   1. Turning from your back to your side while in a flat bed without using bedrails? 2 - A Lot   2. Moving from lying on your back to sitting on the side of a flat bed without using bedrails? 2 - A Lot   3. Moving to and from a bed to a chair (including a wheelchair)? 2 - A Lot   4. Standing up from a chair using your arms (e.g., wheelchair, or bedside chair)? 2 - A Lot   5. To walk in hospital room? 1 - Total   6. Climbing 3-5 steps with a railing? 1 - Total   Basic Mobility Raw Score (Score out of 24.Lower scores equate to lower levels of function) 10   Total Evaluation Time   Total Evaluation Time (Minutes) 15     "

## 2020-02-20 NOTE — PLAN OF CARE
7SR, NC-4L, pt is A&Ox4, pain controlled with Tylenol and Oxycodone. 750 cc of 5% Albumin given for low CVP and B/P. Urine output is marginal, chest tube drainage moderate.

## 2020-02-20 NOTE — PROGRESS NOTES
[facility]  Cardiovascular and Thoracic Surgery Daily Note          Assessment and Plan:   POD#1 s/p CORONARY ARTERY BYPASS GRAFTING X 2 LIMA TO LAD, SV TO PDA; ENDO VEIN HARVEST TO LEFT LEG; ON PUMP WITH CESAR by Dr. Doris Lu.   -CVS: HR: 90s, SBP: 100/60s, ASA, low dose metoprolol, PTA zetia resumed, hypersensitivity to statins, sub q heparin, EF 63% MRI  -Resp: extubated per protocol, sating well on 4L NC, encourage IS  -Neuro: grossly intact, pain controlled with current regimen  -Renal: weight needs to be entered in computer, Crea: 1.44>1.12, BL 1.3  -GI: continue bowel regimen, miralax added   -: continue coto catheter, limited mobility, accurate Is & Os  -Endo: continue insulin gtt x 48 hrs, will consult hospitalist service to help manage insulin gtt tomorrow, pre-op A1C 11.4  -FEN: replace lytes per protocol, Na: 139, K: 4.0, Orders Placed This Encounter      Moderate Consistent CHO Diet  -ID: WBC: 19.9, Temp (24hrs), Av.7  F (37.1  C), Min:95.5  F (35.3  C), Max:100.8  F (38.2  C), completing holly-op abx  -Heme: Hgb: 9.6, plt: 181, acute blood loss anemia   -Proph: PCDs, ASA, BB 12.5 mg bid, no statin as hypersensitivity, sub q heparin   -Dispo: ICU-->St 33, initiate therapies, encourage IS           Interval History:   Sitting up in chair, resting, denies pain, tolerating clears, working with rehab          Medications:       acetaminophen  975 mg Oral Q8H     albumin human  25 g Intravenous Once     aspirin  325 mg Oral Daily     ceFAZolin  2 g Intravenous Q8H     gabapentin  300 mg Oral BID     insulin aspart   Subcutaneous TID w/meals     lidocaine  2 patch Transdermal Q24H     lidocaine   Transdermal Q8H     metoprolol tartrate  12.5 mg Oral BID     mupirocin  0.5 g Both Nostrils BID     pantoprazole (PROTONIX) IV  40 mg Intravenous Daily     senna-docusate  1 tablet Oral BID    Or     senna-docusate  2 tablet Oral BID     sodium chloride (PF)  3 mL Intracatheter Q8H      @MEDRN-@          Physical Exam:   Vitals were reviewed  Blood pressure 109/65, pulse 90, temperature 100.6  F (38.1  C), resp. rate 19, weight 100.2 kg (221 lb), SpO2 96 %.  Rhythm: NSR    Lungs: course    Cardiovascular: s1/s2, no m/r/g    Abdomen: s/nt/nd    Extremeties: no LE edema    Incision: cdi    CT: to suction     Weight:   Vitals:    02/19/20 0704   Weight: 100.2 kg (221 lb)            Data:   Labs:   Lab Results   Component Value Date    WBC 19.9 02/20/2020     Lab Results   Component Value Date    RBC 3.16 02/20/2020     Lab Results   Component Value Date    HGB 9.6 02/20/2020     Lab Results   Component Value Date    HCT 29.8 02/20/2020     No components found for: MCT  Lab Results   Component Value Date    MCV 94 02/20/2020     Lab Results   Component Value Date    MCH 30.4 02/20/2020     Lab Results   Component Value Date    MCHC 32.2 02/20/2020     Lab Results   Component Value Date    RDW 13.8 02/20/2020     Lab Results   Component Value Date     02/20/2020       Last Basic Metabolic Panel:  Lab Results   Component Value Date     02/20/2020      Lab Results   Component Value Date    POTASSIUM 4.0 02/20/2020     Lab Results   Component Value Date    CHLORIDE 109 02/20/2020     Lab Results   Component Value Date    SUE 7.8 02/20/2020     Lab Results   Component Value Date    CO2 25 02/20/2020     Lab Results   Component Value Date    BUN 33 02/20/2020     Lab Results   Component Value Date    CR 1.44 02/20/2020     Lab Results   Component Value Date     02/20/2020       Suzi Crooks PA-C  Pager #: [phone]

## 2020-02-20 NOTE — PROGRESS NOTES
"NUTRITION ASSESSMENT      REASON FOR ASSESSMENT:  Cardiac Surgery Nutrition Consult    CURRENT DIET / INTAKE:  Prior to admission the patient was eating well. He would normally eat three meals a day with some snacks in between. He liked to eat chicken, chili, chicken soup, and fajitas. He would typically eat from home majority of the time except he would eat out once a week. In addition, he expressed concern over the 4-5 lb he had gained right before admission. However, he noted his diuretic dosing was recently decreased and felt the weight gain was due to that.     The patient stated he has had a poor appetite since surgery, however today he had a boost and was in the middle of drinking it. He stated he had drank about half of it already.     ANTHROPOMETRICS:   Ht: 1.803m (5' 11\")  Wt: 100.2 kg  BMI: 30.84 kg/m2  IBW: 78.2 kg  Weight Status: Obesity Grade I BMI 30-34.9  %IBW: 128%    MALNUTRITION:  Patient does not meet two of the following criteria necessary for diagnosing malnutrition:  significant weight loss, reduced intake, subcutaneous fat loss, muscle loss or fluid retention. Nutrition Focused Physical Assessment (NFPA) not appropriate at this time.    NUTRITION DIAGNOSIS:   Inadequate oral intake related to poor appetite post-op as evidenced by pt only consuming boost since surgery on 2/19.     INTERVENTIONS:    Nutrition Prescription:  Continue with Vanilla Boost Glucose Control @ 10 am and 2pm.     Implementation:  Nutrition Education (Content):  Discussed the importance of adequate nutrition post-op for healing and energy, emphasizing protein foods, and encouraged patient to order a protein food at each meal.    Goals:  Patient to consume ~75% at meals in the next 3 - 5 days    Follow Up/Monitoring (InPatient):  Food and Fluid intake -  Monitor for adequacy    Follow Up/Monitoring (OutPatient):  Patient will participate in out-patient cardiac rehab and attend nutrition classes during the program    Priscilla " Mine  Dietetic Intern

## 2020-02-20 NOTE — PROGRESS NOTES
[id number]  Pt wanted to stay up in chair.  Back to bed after cardiac rehab.  Able to wean Phenylephrine off after 250cc 5% albumin.  Pain meds given including Flexeril and Gabapentin  Uses IS to 500cc . When falling asleep, O 2 sats decreased.  I: O2 increased to 6 LPM.    U/O borderline.  Care passed to next nurse.

## 2020-02-20 NOTE — PROGRESS NOTES
[facility]  Critical Care Service  Progress Note  Date of Service (when I saw the patient): 02/20/2020  Main Plans for Today  Transfer out of ICU  Intensivist team signing off  Assessment & Plan   Jose Reid is a 59 year old male PMH DM2, HFrEF, chronic renal insufficiency who was admitted on 2/19/2020 s/p CABG x2.    Neuro:  # Acute pain  - Scheduled acetaminophen, gabapentin, lidocaine patch  - Hydromorphone and oxycodone available prn    CV:  # Shock secondary to vasoplegia post-operatively   # CAD x/p CABG x2  # History of dyslipidemia  # History of hypertension  - Phenylephrine off at 0900 this morning  - Cardiac medications per CV surgery (PTA asprin, carvedilol 50 mg BID, ezetimibe, hydralazine 20 mg TID, losartan 25 mg daily, torsemide 20 mg daily)    Resp:  # Hypoxia in the post-op setting, suspect component of atelectasis  - Wean oxygen as able  - OOB and IS for pulmonary hygiene    GI/Nutrition:  # Nutrition  - Moderate consistent carb diet    Renal:  # Chronic renal insufficiency  - Monitor function and electrolytes as needed with replacement per ICU protocols  - Adjust medications as needed for renal clearance  - Monitor I/Os, coto to be removed at timing of CV surgery request  - Maintain euvolemia    ID:  No acute issues  - Completed holly-op cefazolin    Endocrine:  # DM type 2  - Insulin infusion  - Goal blood glucose 140-180    Heme:  # Acute blood loss anemia  - Repeat CBC tomorrow, no signs of acute bleeding this morning  - Monitor chest tube output    MSK:  - Sternal precautions    General cares:  DVT Prophylaxis: Defer to primary service  GI Prophylaxis: PPI  Restraints: Restraints for medical healing needed: NO  Family update by me today: Yes   Current lines are required for patient management:  R introducer  PIV x1  CT x2  Wound vac  Coto    Karen Gautam  Time Spent on this Encounter   Billing: I spent 15 minutes bedside and on the inpatient unit today managing the  care of Jose Reid in relation to the issues listed in this note.  Interval History   No acute events overnight. Was up to chair from 2am to 10:30am. Now complaining of mild shortness of breath, particularly when trying to do IS. Complains of mild nausea resolved after getting back to bed. Pain controlled. No numbness/tingling other than baseline neuropathy.   Physical Exam   Temp: 100.6  F (38.1  C) Temp src: Bladder Temp  Min: 95.5  F (35.3  C)  Max: 100.8  F (38.2  C) BP: 109/65 Pulse: 90 Heart Rate: 90 Resp: 19 SpO2: 96 % O2 Device: Nasal cannula Oxygen Delivery: 4 LPM  Vitals:    02/19/20 0704   Weight: 100.2 kg (221 lb)     I/O last 3 completed shifts:  In: 3690.84 [P.O.:180; I.V.:2800.84; Other:210]  Out: 2155 [Urine:1105; Blood:480; Chest Tube:570]    GEN: Awake, alert  EYES: No icterus   HEENT: Mucous membranes moist  CV: RRR, no gallops, rubs, or murmurs; pedal pulses 2+; no pedal edema  PULM/CHEST: Clear breath sounds bilaterally without rhonchi, crackles or wheeze; symmetric chest rise  GI: Abdomen rounded; bowel sounds present; soft and non-tender to palpation  : Lin catheter in place, urine yellow and clear  NEURO: No focal deficits, sensation intact at baseline, strength intact   SKIN: Sternal wound covered with wound vac    Imaging personally reviewed: CXR appears underventilated, no pneumothorax    Data   Recent Labs   Lab 02/20/20  0510 02/19/20  1115 02/19/20  0945   WBC 19.9* 14.6* 10.9   HGB 9.6* 10.3* 9.5*   MCV 94 92 92    163 115*   INR  --  1.15* 1.27*    142 144   POTASSIUM 4.0 4.1 4.3   CHLORIDE 109 110* 113*   CO2 25 26 28   BUN 33* 28 30   CR 1.44* 1.12 1.14   ANIONGAP 5 6 3   SUE 7.8* 7.8* 8.6   * 177* 151*   ALBUMIN 3.0* 2.3*  --    PROTTOTAL 5.7* 4.8*  --    BILITOTAL 0.6 0.5  --    ALKPHOS 41 44  --    ALT 22 22  --    AST 60* 24  --

## 2020-02-20 NOTE — PLAN OF CARE
Pt POD for a CAB x2 extubated per and doing well off all gtts BP stable   Oral Oxycodone and flexeril effective for pain control.  Marco Antonio 120 CT dump surgery paged no new orders .     Kate Rose RN

## 2020-02-20 NOTE — PLAN OF CARE
PT-Attempted to see. Patient getting a respiratory treatment and c/o fatigue and SOB. Reports dialysis wore him out.

## 2020-02-21 NOTE — CONSULTS
[facility]  Consult Note - Hospitalist Service     Date of Admission:  2/19/2020  Consult Requested by: Suzi Crooks PA-C  Reason for Consult: Glycemic Management     Assessment & Plan   Jose Reid is a 59 year old male admitted on 2/19/2020. He is s/p CABG x 2 on 2/19/2020. PMH hypertension, HFrEF, hyperlipidemia, CAD, CKD stage III, uncontrolled DM 2.    CAD s/p CABG x 2 LIMA to LAD, SV to PDA  Surgery completed by Dr. Lu, uncomplicated surgery under general anesthesia. . Post op course was complicated with hypoxia with atelectasis, which has improved. Oxygen needs decreasing, from 6L NC to 4L NC.   --needs encouragement for pulmonary hygiene  --cardiac meds per CV surgery (torsemide, losartan, hydralazine, carvedilol, ASA)  --Defer postoperative surgical management including pain medication, DVT and antibiotic prophylaxis, disposition to CV surgery  --Has been constipated postoperatively, added bisacodyl suppository as needed.    Hx of hypertension, HFrEF  Follows with Dr. San of cardiology, most recently evaluated 2/10.  Most recent EF 45-50% in August 2019, normalized on cMRI 9/2019.   --Management per CV surgery and cardiology  --Plans to follow-up with cardiology 1 month postoperative    Postoperative anemia  .  Postoperative hemoglobin 9.1, platelets 154.  Chest tube output has been as expected.  --Continue monitoring chest tube output  --CBC tomorrow a.m.    Diabetes Mellitus, Type II, uncontrolled complicated by severe peripheral neuropathy  Pre op Hgb A1c 11.4%.  PTA long-acting insulin at bedtime, with insulin aspart sliding scale.  Has been on insulin drip with adequate control since surgery and will continue x48 hours per CV surgery-ending at approximately 11 AM. Has been receiving 2 units/hour regular insulin.   --On moderate carb diet, appetite has been very poor, has not really started eating yet per nursing  --Stop fluids with D5%, will switch to NS at  50  --We will determine what 24-hour insulin needs after stopping dextrose drip - discussed with family that insulin will likely need to be titrated daily depending on what his sliding scale needs are, and appetite  --will hold on lantus until bedtime, start low given poor appetite (5 units lantus tonight)  -- will start carb coverage - 1 unit per 15 g carbs, plus medium sliding scale     YORDY on CKD stage III  Baseline creatinine ~1.1, and increased 1.3 but improved with reduction of torsemide and cessation of spironolactone.  Postoperatively, creatinine has risen from 1.48-1.68 on 2/21.  Electrolytes have been replaced per ICU protocols, potassium 4.3, phod WNL, magnesium elevated 2/20, ionized calcium 4.4.  --With rising creatinine, would suggest removing potassium from fluids    Hyperlipidemia  Has history of hypersensitivity to statin. PTA meds include alpha lipoic acid, Repatha injection every 14 days, fish oil.   --Return to PTA meds when appetite improves    The patient's care was discussed with the Attending Physician, Dr. Miguel Ángel Jones, Bedside Nurse, Patient and Patient's Family.    Suzi Draper, PRAT[facility]    ______________________________________________________________________    Chief Complaint   Elective CABG    History is obtained from the patient and his wife Leyla    History of Present Illness   Jose Reid is a 59 year old male who presented for elective CABG x 2 on 2/19/2020. PMH hypertension, HFrEF, hyperlipidemia, CAD, CKD stage III, uncontrolled DM 2.    The patient typically follows with Dr. CLOUD, was recently evaluated on 2/10/2020.  Patient has had a complicated health history over the past year including an admission for sepsis, found to have HFrEF-started following with cardiology.  His renal dysfunction worsened at this time, improved with stopping offending meds.  His diabetes is uncontrolled, and following his admission for sepsis-the patient's  peripheral neuropathy has progressed significantly as well.  He now has such severe peripheral neuropathy in his hands and feet with bilateral foot drop, requires the use of bilateral leg braces at home.  This has impeded his physical therapy in the hospital, likely will need TCU upon discharge.    I evaluated the patient in the intensive care unit with his wife at the bedside.  He appears a bit drowsy, has not been receiving muscle relaxers today as they made him oversedated yesterday.  He states his pain is been well controlled, he has not been out of bed much due to the weakness from the muscle relaxers, and bilateral lower extremity weakness from his peripheral neuropathy.  Due to this, he has been shallowly breathing, still requiring 4 L nasal cannula with some atelectasis on imaging.  He denies any nausea, but reports his appetite is very low he has been drinking Ensure only. No BM yet. He is alert oriented, but drowsy at times.  Wife is at the bedside, and answers questions for the patient as well.    Review of Systems   The 10 point Review of Systems is negative other than noted in the HPI or here.     Past Medical History    I have reviewed this patient's medical history and updated it with pertinent information if needed.   Past Medical History:   Diagnosis Date     Abscess     MSSA back abscess     CAD (coronary artery disease)     1/24 Angio: multivessel disease>>>scheduled for CABG 2/19/20     Diabetes (H)      Dyslipidemia      Erectile dysfunction      Hypertension      Peripheral neuropathy      Seasonal depression (H)        Past Surgical History   I have reviewed this patient's surgical history and updated it with pertinent information if needed.  Past Surgical History:   Procedure Laterality Date     APPENDECTOMY       BYPASS GRAFT ARTERY CORONARY N/A 2/19/2020    Procedure: CORONARY ARTERY BYPASS GRAFTING X 2 LIMA TO LAD, SV TO PDA; ENDO VEIN HARVEST TO LEFT LEG; ON PUMP WITH CESAR;  Surgeon:  Doris Lu MD;  Location:  OR     CV HEART CATHETERIZATION WITH POSSIBLE INTERVENTION N/A 1/24/2020    Procedure: Heart Catheterization with Possible Intervention;  Surgeon: Chyna Velez MD;  Location:  HEART CARDIAC CATH LAB     CV LEFT HEART CATH N/A 1/24/2020    Procedure: Left Heart Cath;  Surgeon: Chyna Velez MD;  Location:  HEART CARDIAC CATH LAB     CV RIGHT HEART CATH N/A 1/24/2020    Procedure: Right Heart Cath;  Surgeon: Chyna Velez MD;  Location:  HEART CARDIAC CATH LAB     INCISION AND DRAINAGE, ABSCESS, SIMPLE N/A 7/19/2019    Procedure: INCISION AND DRAINAGE OF BACK ABSCESS;  Surgeon: Richard Lopez MD;  Location:  GI       Social History   I have reviewed this patient's social history and updated it with pertinent information if needed.  Social History     Tobacco Use     Smoking status: Never Smoker     Smokeless tobacco: Never Used   Substance Use Topics     Alcohol use: No     Frequency: Never     Drug use: No       Family History   I have reviewed this patient's family history and updated it with pertinent information if needed.   Family History   Problem Relation Age of Onset     Hypertension Maternal Grandmother      Diabetes Maternal Grandmother      Family History Negative Mother      Unknown/Adopted Father      Diabetes Paternal Grandmother      Cerebrovascular Disease Paternal Grandmother      Family History Negative Brother      Family History Negative Sister      Family History Negative Son      Family History Negative Daughter        Medications   I have reviewed this patient's current medications    Allergies   Allergies   Allergen Reactions     Exenatide Rash     Hmg-Coa-R Inhibitors Muscle Pain (Myalgia) and Other (See Comments)     Extreme Fatigue       Amlodipine      Other reaction(s): Dizziness  Dizzy       Hctz [Hydrochlorothiazide]        Physical Exam   Vital Signs: Temp: 99.9  F (37.7  C) Temp src: Bladder BP:  106/69 Pulse: 90 Heart Rate: 89 Resp: 18 SpO2: 93 % O2 Device: Nasal cannula Oxygen Delivery: 6 LPM  Weight: 233 lbs 7.47 oz     Constitutional: awake, drowsy, cooperative and no apparent distress  Eyes: lids and lashes normal and pupils equal, round and reactive to light  Respiratory: no increased work of breathing, mild dec in air exchange and diminished breath sounds bibasilar, clear in upper lobes, appears to be shallowly breathing when resting  Cardiovascular: normal S1 and S2, no murmur noted and no edema  GI: soft, non-distended and non-tender  Skin: no redness, warmth, or swelling.  Incision has wound VAC, well approximated, dressing is clean dry and intact.  Musculoskeletal: there is no redness, warmth, or swelling of the joints, bilateral foot drop    Neuropsychiatric: General: normal, calm, poor eye contact  Level of consciousness: drowsy  Affect: flat    Data   I personally reviewed no images or EKG's today.  Results for orders placed or performed during the hospital encounter of 02/19/20 (from the past 24 hour(s))   Glucose by meter   Result Value Ref Range    Glucose 133 (H) 70 - 99 mg/dL   Glucose by meter   Result Value Ref Range    Glucose 138 (H) 70 - 99 mg/dL   Glucose by meter   Result Value Ref Range    Glucose 159 (H) 70 - 99 mg/dL   Glucose by meter   Result Value Ref Range    Glucose 148 (H) 70 - 99 mg/dL   Glucose by meter   Result Value Ref Range    Glucose 154 (H) 70 - 99 mg/dL   Glucose by meter   Result Value Ref Range    Glucose 133 (H) 70 - 99 mg/dL   Glucose by meter   Result Value Ref Range    Glucose 129 (H) 70 - 99 mg/dL   Glucose by meter   Result Value Ref Range    Glucose 120 (H) 70 - 99 mg/dL   Glucose by meter   Result Value Ref Range    Glucose 127 (H) 70 - 99 mg/dL   Basic metabolic panel (AM Draw)   Result Value Ref Range    Sodium 136 133 - 144 mmol/L    Potassium 4.3 3.4 - 5.3 mmol/L    Chloride 108 94 - 109 mmol/L    Carbon Dioxide 24 20 - 32 mmol/L    Anion Gap 4 3 - 14  mmol/L    Glucose 147 (H) 70 - 99 mg/dL    Urea Nitrogen 39 (H) 7 - 30 mg/dL    Creatinine 1.68 (H) 0.66 - 1.25 mg/dL    GFR Estimate 44 (L) >60 mL/min/[1.73_m2]    GFR Estimate If Black 51 (L) >60 mL/min/[1.73_m2]    Calcium 8.1 (L) 8.5 - 10.1 mg/dL   CBC (AM Draw)   Result Value Ref Range    WBC 19.8 (H) 4.0 - 11.0 10e9/L    RBC Count 2.98 (L) 4.4 - 5.9 10e12/L    Hemoglobin 9.1 (L) 13.3 - 17.7 g/dL    Hematocrit 28.3 (L) 40.0 - 53.0 %    MCV 95 78 - 100 fl    MCH 30.5 26.5 - 33.0 pg    MCHC 32.2 31.5 - 36.5 g/dL    RDW 14.0 10.0 - 15.0 %    Platelet Count 154 150 - 450 10e9/L   Glucose by meter   Result Value Ref Range    Glucose 139 (H) 70 - 99 mg/dL   Glucose by meter   Result Value Ref Range    Glucose 117 (H) 70 - 99 mg/dL

## 2020-02-21 NOTE — PROVIDER NOTIFICATION
Pt with increased SOB thruout day on waterseal chest tubes without air leak, pain well controlled with minimal narcotics. VSS O2 requirements less weaned from 6L to 4L NC. CXR reviewed by CV surgery. Chris Lu and Glen updated with above assessment. Ok to transfer per Dr Carroll

## 2020-02-21 NOTE — PROGRESS NOTES
[facility]  Cardiovascular and Thoracic Surgery Daily Note          Assessment and Plan:   POD#2 s/p CORONARY ARTERY BYPASS GRAFTING X 2 LIMA TO LAD, SV TO PDA; ENDO VEIN HARVEST TO LEFT LEG; ON PUMP WITH CESAR by Dr. Doris Lu.   -CVS: HR: 90s, SBP: 100-110/60s, off valentino, ASA, low dose metoprolol, PTA zetia resumed, hypersensitivity to statins, sub q heparin, EF 63% MRI  -Resp: extubated per protocol, sating well on 6<4L NC, encourage IS  -Neuro: grossly intact, pain controlled with current regimen  -Renal: weight needs to be entered in computer, Crea: 1.68<1.44>1.12, BL 1.3  -GI: continue bowel regimen, miralax added   -: continue coot catheter, limited mobility, accurate Is & Os  -Endo: continue insulin gtt x 48 hrs, appreciate hospitalist service to help manage insulin gtt today, pre-op A1C 11.4  -FEN: replace lytes per protocol, Na: 136, K: 4.3, Orders Placed This Encounter      Moderate Consistent CHO Diet  -ID: WBC: 19.8, Temp (24hrs), Av.7  F (38.2  C), Min:99.9  F (37.7  C), Max:101.7  F (38.7  C), completed holly-op abx  -Heme: Hgb: 9.1, plt: 154, acute blood loss anemia   -Proph: PCDs, ASA, BB 12.5 mg bid, no statin as hypersensitivity, sub q heparin   -Dispo: ICU-->[address], continue therapies with leg braces, encourage IS          Interval History:   Lying in bed, breathing fine, tolerating diet, needs leg braces on when working with rehab         Medications:       acetaminophen  975 mg Oral Q8H     aspirin  325 mg Oral Daily     ezetimibe  10 mg Oral At Bedtime     gabapentin  300 mg Oral BID     heparin ANTICOAGULANT  5,000 Units Subcutaneous Q8H     insulin aspart   Subcutaneous TID w/meals     lidocaine  2 patch Transdermal Q24H     lidocaine   Transdermal Q8H     methocarbamol  500 mg Oral 4x Daily     metoprolol tartrate  12.5 mg Oral BID     pantoprazole  40 mg Oral QAM AC     polyethylene glycol  17 g Oral BID     senna-docusate  1 tablet Oral BID    Or     senna-docusate   2 tablet Oral BID     sodium chloride (PF)  3 mL Intracatheter Q8H     [facility]          Physical Exam:   Vitals were reviewed  Blood pressure 106/69, pulse 90, temperature 99.9  F (37.7  C), resp. rate 18, weight 105.9 kg (233 lb 7.5 oz), SpO2 93 %.  Rhythm: s. tach    Lungs: course    Cardiovascular: s1/s2, no m/r/g    Abdomen: s/nt/nd    Extremeties: trace LE edema    Incision: cdi    CT: *to waterseal    Weight:   Vitals:    02/19/20 0704 02/21/20 0400   Weight: 100.2 kg (221 lb) 105.9 kg (233 lb 7.5 oz)            Data:   Labs:   Lab Results   Component Value Date    WBC 19.8 02/21/2020     Lab Results   Component Value Date    RBC 2.98 02/21/2020     Lab Results   Component Value Date    HGB 9.1 02/21/2020     Lab Results   Component Value Date    HCT 28.3 02/21/2020     No components found for: MCT  Lab Results   Component Value Date    MCV 95 02/21/2020     Lab Results   Component Value Date    MCH 30.5 02/21/2020     Lab Results   Component Value Date    MCHC 32.2 02/21/2020     Lab Results   Component Value Date    RDW 14.0 02/21/2020     Lab Results   Component Value Date     02/21/2020       Last Basic Metabolic Panel:  Lab Results   Component Value Date     02/21/2020      Lab Results   Component Value Date    POTASSIUM 4.3 02/21/2020     Lab Results   Component Value Date    CHLORIDE 108 02/21/2020     Lab Results   Component Value Date    SUE 8.1 02/21/2020     Lab Results   Component Value Date    CO2 24 02/21/2020     Lab Results   Component Value Date    BUN 39 02/21/2020     Lab Results   Component Value Date    CR 1.68 02/21/2020     Lab Results   Component Value Date     02/21/2020     Suzi Crooks PA-C  Pager #: [phone]       Respiratory

## 2020-02-21 NOTE — PLAN OF CARE
Took over nursing care at 1100.  Pt had been off the phenylepherine drip and BP dropped to 60's/30's, restarted drip at low dose, BP improved to 100's/60's.    LS diminished, only getting IS to 500; also LGF this evening, provided fan and encouraged IS.  Two chest tubes, moderate output.  Wound vac to sternal incision, no output.  Pacer is capped.  Tolerating PO liquids and meds, pain controlled with scheduled and PRN oral analgesics.  Insulin drip algorithm two.  At 6pm attempted to get pt up to chair; with assist of 2 stood pt easily but then pt began buckling knees and c/o dizziness (BP normal to high at the time).  Sat pt back down at edge of bed and did some leg exercises, stood again and pt buckling knees and tipping backwards - placed back to bed.   Daughter came back in room and suggested that he may do better with his leg braces on (which he wears for neuropathy and are in his closet).    Pt did agree to try to get OOB again this evening.

## 2020-02-21 NOTE — PLAN OF CARE
Discharge Planner PT   Patient plan for discharge: Didn't state today, but agreeable to rehab yesterday.    Current status: Patient sitting in chair when therapist arrived. Transfers sit to stand with mod to max assist of 1-2 depending on height of surface. Therapist donned AFO's. Patient tolerated amb 15 feet times 2. Became very fatigued and lightheaded with first walk and had to call nurse to bring a chair up behind patient. Needs mod assist for balance during amb due to decreased strength and sensation in lower legs from neuropathy. Left in supine with nurse present.   Barriers to return to prior living situation: Current level of assist, weakness, sternal precautions and patient typically uses his arms for transitions due to ankle weakness and AFO's, split level home   Recommendations for discharge: ARC  Rationale for recommendations: Anticipate patient will need continued intense skilled PT after discharge due to baseline footdrop and current need for sternal precautions. Patient fatigued quickly today but anticipate he will work up to tolerating 3 hours of therapy a day. Patient is very motivated and wife is very involved and supportive

## 2020-02-22 NOTE — DISCHARGE SUMMARY
Discharge Summary     Jose Reid MRN# [id number]   YOB: 1960 Age: 59 year old      Date of Admission:                  2020  Date of Discharge:                  2020  Admitting Physician:               Dr Doris Lu  Discharge Physician:              Rene Carroll MD  Discharging Service:               Cardiovascular and Thoracic Surgery     Primary Provider: Marcial Larry          Admission Diagnoses:   Coronary Artery Disease  Diabetes mellitus              Discharge Diagnosis:    Death from cardiac arrest of unknown cause after CABG                 Discharge Disposition:    Discharge to [facility]                Condition on Discharge:    Discharge condition:    Discharge vitals: N/A   Code status on discharge: N/A           Procedures:   On 2020 Mr Reid successfully underwent CABG x 2 with LIMA to LAD and GSV to PDA by Dr. Lu.          Medications Prior to Admission:     Prescriptions Prior to Admission   (Not in a hospital admission)              Discharge Medications:   None                 Consultations:    Nutrition, Intensivist             Brief History of Illness:   59M who had poorly controlled DM and severe 2-vessel CAD of LAD and RCA elected to undergo CABG.              Hospital Course:       Was extubated within 24 hours of surgery. POD 2 patient had some ongoing SOB needing 4-6L O2 via nasal cannula. He had some mild YORDY which was being monitored. He was transferred out of ICU in a stable condition. At 12:40am on POD 3 patient had an unexpected cardiac arrest and ACLS protocol was initiated, and patient was unable to be resuscitated and stabilized after an hour. At this point, death was pronounced. Patient family was updated and they were at bedside. They ellected to have an autopsy performed. ME office was informed who confirmed that this was not a ME case.                 Significant Results:          Lab Results   Component Value Date      WBC 19.3 02/22/2020            Lab Results   Component Value Date     RBC 2.88 02/22/2020            Lab Results   Component Value Date     HGB 9.5 02/22/2020            Lab Results   Component Value Date     HCT 29.0 02/22/2020      No components found for: MCT        Lab Results   Component Value Date      02/22/2020            Lab Results   Component Value Date     MCH 30.9 02/22/2020            Lab Results   Component Value Date     MCHC 30.7 02/22/2020            Lab Results   Component Value Date     RDW 13.9 02/22/2020            Lab Results   Component Value Date      02/22/2020         Last Basic Metabolic Panel:  Lab Results   Component Value Date      02/22/2020            Lab Results   Component Value Date     POTASSIUM 6.3 02/22/2020            Lab Results   Component Value Date     CHLORIDE 108 02/22/2020            Lab Results   Component Value Date     SUE 9.7 02/22/2020            Lab Results   Component Value Date     CO2 17 02/22/2020            Lab Results   Component Value Date     BUN 45 02/22/2020            Lab Results   Component Value Date     CR 1.82 02/22/2020            Lab Results   Component Value Date      02/22/2020                        Pending Results:    None

## 2020-02-22 NOTE — CODE/RAPID RESPONSE
[facility]    Code Blue Note  2/22/2020   Time Called: 1239    Assessment & Plan     Cardiopulmonary arrest   --post-operative day #2 CABG   A CODE BLUE was initiated after the patient was found unresponsive and pulseless at 0039.  Patient was on telemetry monitoring and it was noted that his leads were off, nursing staff went to go to replace the leads when they found the patient to be pulseless.  Unfortunately, no rhythm strip was printed at the time to know what the exact time the monitor began reading that his leads were off.  CODE BLUE was called and CPR initiated.  Zoll pads placed and rhythm check completed patient noted to be asystolic, CPR resumed.  ACLS protocol followed patient received 2 mg of IV epinephrine 1 g of calcium chloride.  ROSC obtained at 0050 rhythm noted to be atrial fib with rates in the 130s blood pressure is 85/51 thus an epinephrine drip was initiated at 0.3 mics per kilo per minute and V pacer wires attached to box however pacer turned on to rate of 80, Ma 10.  At 0053 patient heart rate trended down to 45 pacemaker not capturing 0.5 mg of IV atropine administered.  Patient did not respond to atropine and no pulse was palpable at 005 for CPR initiated 1 mg of epi administered and ACLS protocol was followed.  1 amp of bicarb was administered for acidosis, pH 6.9.  ROSC obtained at 0103 and Dr. Carroll, from CV surgery, arrived at bedside at 0103.  Patient did lose palpable pulse once again at 0012 and CPR initiated with ACLS protocol being followed.  Dr. Ramirez from ICU also arrived at bedside.  Unfortunately another CODE BLUE was called on a different floor, care was transferred to Dr. Carroll and Dr. Ramirez to continue CODE BLUE management at 0126.    INTERVENTIONS:  -See code blue record    Care was transferred to Dr. Carroll and Dr. Ramirez at 0126 to continue management of CODE BLUE.    Discussed with and defer further cares to Dr. Carroll, CV surgery and Dr. Ramirez,  intensivist    Interval History     Jose Reid is a 59 year old male who was admitted on 2/19/2020 for CABG.    Medical history significant for:   Past Medical History:   Diagnosis Date     Abscess     MSSA back abscess     CAD (coronary artery disease)     1/24 Angio: multivessel disease>>>scheduled for CABG 2/19/20     Diabetes (H)      Dyslipidemia      Erectile dysfunction      Hypertension      Peripheral neuropathy      Seasonal depression (H)      Past Surgical History:   Procedure Laterality Date     APPENDECTOMY       BYPASS GRAFT ARTERY CORONARY N/A 2/19/2020    Procedure: CORONARY ARTERY BYPASS GRAFTING X 2 LIMA TO LAD, SV TO PDA; ENDO VEIN HARVEST TO LEFT LEG; ON PUMP WITH CESAR;  Surgeon: Doris Lu MD;  Location:  OR     CV HEART CATHETERIZATION WITH POSSIBLE INTERVENTION N/A 1/24/2020    Procedure: Heart Catheterization with Possible Intervention;  Surgeon: Chyna Velez MD;  Location:  HEART CARDIAC CATH LAB     CV LEFT HEART CATH N/A 1/24/2020    Procedure: Left Heart Cath;  Surgeon: Chyna Velez MD;  Location:  HEART CARDIAC CATH LAB     CV RIGHT HEART CATH N/A 1/24/2020    Procedure: Right Heart Cath;  Surgeon: Chyna Velez MD;  Location:  HEART CARDIAC CATH LAB     INCISION AND DRAINAGE, ABSCESS, SIMPLE N/A 7/19/2019    Procedure: INCISION AND DRAINAGE OF BACK ABSCESS;  Surgeon: Richard Lopez MD;  Location:  GI       Code Status: Full Code    Allergies   Allergies   Allergen Reactions     Exenatide Rash     Hmg-Coa-R Inhibitors Muscle Pain (Myalgia) and Other (See Comments)     Extreme Fatigue       Amlodipine      Other reaction(s): Dizziness  Dizzy       Hctz [Hydrochlorothiazide]        Physical Exam   Vital Signs with Ranges:  Temp:  [99.2  F (37.3  C)-100.9  F (38.3  C)] 99.2  F (37.3  C)  Pulse:  [88-99] 99  Heart Rate:  [] 94  Resp:  [0-35] 18  BP: (103-161)/(63-94) 140/78  MAP:  [63 mmHg-91 mmHg] 63 mmHg  Arterial  Line BP: ()/(48-67) 99/48  SpO2:  [88 %-98 %] 93 %  I/O last 3 completed shifts:  In: 1119.41 [P.O.:500; I.V.:619.41]  Out: 1025 [Urine:965; Chest Tube:60]    Constitutional: unresponsive  Pulmonary: intubated, assisted respirations  Cardiovascular: CPR in progress  GI: obese  Skin/Integumen: Chest tube in place, sternum wound vac  Neuro: unresponsive  Extremities: pale    Data     ABG:  -  Recent Labs   Lab 02/19/20  1344   PH 7.30*   PCO2 47*   PO2 123*   HCO3 23   O2PER 45       Troponin:    Recent Labs   Lab Test 08/20/19  1315   TROPI 0.060*       IMAGING: (X-ray/CT/MRI)   Recent Results (from the past 24 hour(s))   XR Chest Port 1 View    Narrative    XR PORTABLE CHEST ONE VIEW   2/21/2020 3:43 PM     HISTORY: Shortness of breath on water seal.    COMPARISON: Chest x-ray on 2/20/2020      Impression    IMPRESSION: Single portable AP view of the chest was obtained. Post  surgical changes of cardiac surgery with median sternotomy wires and  surgical clips. Right IJ central catheter tip projects over lower SVC.  Bibasilar chest tubes are again noted. Enlarged cardiac silhouette and  mild pulmonary vascular congestion. Trace right pleural effusion. No  significant left effusion. Small left apical pneumothorax (chest tube  in place). No significant right pneumothorax.    JEFF HERNANDEZ MD   XR Chest Port 1 View    Narrative    EXAM: XR CHEST PORT 1 VW  LOCATION: [facility]  DATE/TIME: 2/22/2020 1:17 AM    INDICATION: Respiratory failure on ventilator  COMPARISON: 02/21/2020, 02/20/2020      Impression    IMPRESSION: Patient is now intubated, endotracheal tube terminates at the danny angled to the right, recommend 3 cm retraction.    Recently seen left apical pneumothorax is no longer evident. Low lung volumes. Mild cardiomegaly, prior sternotomy. Probable atelectasis and effusion at the and left base.        CBC with Diff:  Recent Labs   Lab Test 02/22/20  0108 02/22/20  0056   WBC  --   19.3*   HGB 9.5* 8.9*   MCV  --  101*   PLT  --  144*   INR  --  1.51*        Lactic Acid:    Lab Results   Component Value Date    LACT 8.7 02/22/2020           Comprehensive Metabolic Panel:  Recent Labs   Lab 02/22/20  0108 02/22/20  0056    138   POTASSIUM 6.3* 5.4*   CHLORIDE  --  108   CO2  --  17*   ANIONGAP  --  13   GLC  --  329*   BUN  --  45*   CR  --  1.82*   GFRESTIMATED  --  40*   GFRESTBLACK  --  46*   SUE  --  9.7   MAG  --  3.3*   PHOS  --  6.2*   PROTTOTAL  --  5.9*   ALBUMIN  --  2.4*   BILITOTAL  --  0.3   ALKPHOS  --  87   AST  --  75*   ALT  --  45       INR:    Recent Labs   Lab Test 02/22/20 0056   INR 1.51*       Time Spent on this Encounter   I spent 47 minutes (8794 - 5635) of critical care time on the unit/floor managing the care of Jose Reid. Upon evaluation, this patient had a high probability of imminent or life-threatening deterioration due to cardiopulmonary arrest, which required my direct attention, intervention, and personal management. 100% of my time was spent at the bedside counseling the patient and/or coordinating care regarding services listed in this note.    PRATIMA Valenzuela CNP

## 2020-02-22 NOTE — ANESTHESIA CARE TRANSFER NOTE
Patient: Jose Reid    * No procedures listed *    Diagnosis: * No pre-op diagnosis entered *  Diagnosis Additional Information: No value filed.    Anesthesia Type:   No value filed.     Note:  Airway :ETT  Patient transferred to:Medical/Surgical Unit  Comments: Diagnosis: Cardiac/Respiratory Arrest  Procedure: Intubation  Ordering Physician: Liliana Prado  Location: [id number] Report: Identifed the Patient, Identified the Reponsible Provider, Reviewed the pertinent medical history, Discussed the surgical course, Reviewed Intra-OP anesthesia mangement and issues during anesthesia, Set expectations for post-procedure period and Allowed opportunity for questions and acknowledgement of understanding      Vitals: (Last set prior to Anesthesia Care Transfer)              Electronically Signed By: PRATIMA Osorio CRNA  February 22, 2020  1:52 AM

## 2020-02-22 NOTE — PROGRESS NOTES
Pt found unresponsive and pulseless at 0039, CPR initiated. See code blue documentation for details

## 2020-02-22 NOTE — DEATH PRONOUNCEMENT
MD DEATH PRONOUNCEMENT    Called to pronounce Jose Reid dead.    Physical Exam: Unresponsive to noxious stimuli, Spontaneous respirations absent, Breath sounds absent, Carotid pulse absent, Heart sounds absent, Pupillary light reflex absent and Corneal blink reflex absent    Patient was pronounced dead at 01:45 AM, 2020.    Preliminary Cause of Death: Cardiac arrest after CABG due to unknown etiology    Principal Problem:    CAD (coronary artery disease)  Active Problems:    CAD in native artery       Infectious disease present?: NO    Communicable disease present? (examples: HIV, chicken pox, TB, Ebola, CJD) :  NO    Multi-drug resistant organism present? (example: MRSA): NO    Please consider an autopsy if any of the following exist:  YES Unexpected or unexplained death during or following any dental, medical, or surgical diagnostic treatment procedures.   NO Death of mother at or up to seven days after delivery.     NO All  and pediatric deaths.     NO Death where the cause is sufficiently obscure to delay completion of the death certificate.   NO Deaths in which autopsy would confirm a suspected illness/condition that would affect surviving family members or recipients of transplanted organs.     The following deaths must be reported to the 's Office:  YES A death that may be due entirely or in part to any factors other than natural disease (recent surgery, recent trauma, suspected abuse/neglect).   NO A death that may be an accident, suicide, or homicide.     NO Any sudden, unexpected death in which there is no prior history of significant heart disease or any other condition associated with sudden death.   NO A death under suspicious, unusual, or unexpected circumstances.    NO Any death which is apparently due to natural causes but in which the  does not have a personal physician familiar with the patient s medical history, social, or environmental situation or the  circumstances of the terminal event.   NO Any death apparently due to Sudden Infant Death Syndrome.     YES Deaths that occur during, in association with, or as consequences of a diagnostic, therapeutic, or anesthetic procedure.   NO Any death in which a fracture of a major bone has occurred within the past (6) six months.   NO A death of persons note seen by their physician within 120 days of demise.     NO Any death in which the  was an inmate of a public institution or was in the custody of Law Enforcement personnel.   NO  All unexpected deaths of children   NO Solid organ donors   NO Unidentified bodies   NO Deaths of persons whose bodies are to be cremated or otherwise disposed of so that the bodies will later be unavailable for examination;   NO Deaths unattended by a physician outside of a licensed healthcare facility or licensed residential hospice program   NO Deaths occurring within 24 hours of arrival to a health care facility if death is unexpected.    NO Deaths associated with the decedent s employment.   NO Deaths attributed to acts of terrorism.   NO Any death in which there is uncertainty as to whether it is a medical examiner s care should be discussed with the medical investigator.        Body disposition: Autopsy was discussed with family member:  Spouse in person.  Permission for autopsy was deferred (pending family decision).

## 2020-02-22 NOTE — PROGRESS NOTES
Cardiothoracic Surgery Event Note    Paged by nurse at 12:44am that patient was asystolic and code blue was started. Advised nurse to start pacing using his temporary V wires and I came into the hospital immediately.  Dr Hoang, CV surgeon on call, alerted about patient condition and he was also coming in to assess the patient.    When I walked into the room, patient had regained his pulse after 12 minutes of ACLS, he was on an epi drip. He was intubated and being bagged, but was unresponsive, with fixed pupils. His pacing wires were not capturing but he had his own rhythm.  Accordingly to his nurse, he was last evaluated around 1130 when he was on 6L NC, which is similar to what he has been on all day and he did not have any new complaints, except for some ongoing SOB which was likely due to fluid overload, but we were holding lasix due to YORDY. Around 1240, his monitor alarmed and when patient was evaluated, he was found to be unresponsive, in asystole. CPR was started and I was alerted at this time.  Epi drip was weaned down from 0.1 to 0.03 due to high BP in 190s. Labs sent, EKG and CXR done. CXR showed no obvious pathology, EKG showed non-specific ST changes. He had a transient episode of bradycardia with loss of pulse and CPR was restarted but he regained ROSC in a few minutes with a dose of Epi.   Bedside TTE performed, no pericardial effusion seen. Discussed value of emergent opening of sternotomy, but decided against it in light of functional chest tubes and no pericardial fluid seen of CESAR.   Patient then proceeded to become bradycardic, increased Epi and attempted to pace using transcutaneous pacing with no successful capture, despite multiple attempts.   Patient then lost his rhythm again and CPR was restarted. At this point his rhythm appeared to be V Fib so he was shocked multiple times. We were unsuccessful to achieve ROSC this time despite multiple shocks, Epi and ongoing CPR. We continued CPR for  about 25 more minutes this time, and since by now it had been over an hour that the code was going on, we decided to stop the code. Patient was pronounced dead at 1:45am.    Dr Hoang was at bedside at this point, Dr Lu, patients primary surgeon was kept aware of his deteriorating condition.    The patients family was updated of the code, and the arrived in the hospital soon after patient was pronounced.    Please refer to code BLUE documentation for exact timings and interventions.

## 2020-02-22 NOTE — PLAN OF CARE
Arrived floor @ 1900. VSS. A/O. Up- strong 2/belt/walker, Pt use AFO brace, very unstable when walk. Wound vac on, CT water seal. Denies pain. Lin good UOP. Tolerating diet. Pacer capped. Tele SR. Slight labor breathing, Lungs dim,  only. 5L O2, changed to oxymask, mouth breather when sleeps. Chest X ray done in ICU.

## 2020-02-22 NOTE — PROGRESS NOTES
SPIRITUAL HEALTH SERVICES Progress Note  [facility]     Responded to on-call pager of request by family grieving the loss of their loved one, Jose. Spoke with charge nurse Mariah and cardio surgeon regarding the situation- pt  quickly and unexpectedly.     Upon arrival to the pt's room, family was surrounding the bed and reminiscing about Jose. Wife Kassie told me I wasn't needed as their family friend/, Twan, had arrived. He and Jose are lifetime friends- Jose was a  himself. Spoke with Twan to let him know if he would rather be a friend than a  during this time, my support was available.     [facility] remains available per family request.     Maggie Song   Intern

## 2020-02-22 NOTE — PROGRESS NOTES
Came urgently in to see patient as he had coded on floor.  On going CPR for approximately 1 hour and finally no rhythm. Chest ultrasound showed no pericardial effusion and thus chest not opened. Patient .  Discussed this with family when they arrived.

## 2020-02-24 LAB — INTERPRETATION ECG - MUSE: NORMAL

## 2020-02-24 NOTE — PROGRESS NOTES
Spoke with wife, Alejandrina, today regarding Autopsy. She was unaware that she would need to coordinate an independent autopsy. I explained that the ME had declined the case. I had two phone numbers that she could call for places to look into. We reviewed that a hospital autopsy is not the same as a ME autopsy as they are not legal criminal forensic pathologists. The hospital will do a routine autopsy looking for the cause of death. Alejandrina decided to go with [place] for the autopsy as she wants to know what happened and has the viewing planned for Thursday with  on Friday. I spoke to the pathologist on call at [facility] and he said to the best of his knowledge it could be done on Monday or Tuesday. Alejandrina and daughter came back into hospital to sign a consent for changing locations. [facility] called for transport and consent faxed.

## 2020-02-25 LAB — INTERPRETATION ECG - MUSE: NORMAL

## 2020-04-07 LAB — COPATH REPORT: NORMAL

## 2021-01-11 NOTE — PLAN OF CARE
A&O. Febrile during beginning of shift but resolved without intervention. BPs on the softer side. Up with SBA in room. Abscess on back, dressing and packing to be changed BID, see WOC orders. 5mg oxycodone given x1 for pain, effective. Diabetic, needs encouragement to call to have glucose checked prior to meals.   
A&Ox4. VSS. C/o mild back pain - PRN Tylenol given x2. Dressing outer cover changed d/t large serosanguinous drainage, packing in place. Mod carb diet, BG checks. Up independently. PIV SL + abx. Plan for discharge today home with PO abx.   
A/o x 4.  Dressing changed to back in room with surgery.  Prn IV dilaudid given x 1.  C/o slight pain to back prn Tylenol given effective.  Ind in room.  Tolerating mod carb diet.  Sliding scale insulin given once.  Plan to discharge tomorrow on oral ATB.     
Alert and oriented x4. VSS. Denies pain. Up independently. Dressing changed per MD. Discharge paperwork gone over with pt and wife, questions answered. Sent with belongings and medications.   
Low grade temp of 99.4, other vitals stable. CMS intact. Up independently. Pain well controlled with oxycodone. Dressing c/d/I. A&OX4.   
Patient is A&Ox4. VSS ex Tmax 99.5, now 98.8. C/o back pain, gave oxy x1. On Mod CHO diet. Continent. R PIV SL, redressing dressing. Wound cares done on abel of 7/18. Calls appropriately.   
Patient is A&Ox4. VSS, afebrile. C/o back pain, gave oxy x2. Up independently to bathroom. POD1 of I&D. Wound on back CDI with dressing. DELORES JALLOH. JEB collected. Calls appropriately.   
Patient is A&Ox4. VSS. C/o back pain, gave oxycodone x1. Up independently to bathroom. Wound to back, changed by surgeon, small amount of dried drainage on back. BM on eves. Tolerating Mod CHO diet. Plan for NPO at midnight for possible extensive debriedment in AM. Calls appropriately.   
Patient is A&Ox4. VSS. C/o back wound pain, gave IV dilaudid x1. On Mod CHO diet. Continent of bowel and bladder. R PIV SL. Wound on mid back, covered with gauze, mild drainage (serosanguinous), changed dressing on back, moderately saturated. Up SBA to bathroom.   
VSS. RA. A&Ox4. Back pain managed with oxycodone and tylenol. Dressing changed and additional fluid removed from back via surgeon, per pt: much pressure relief. Dressing now CDI, some gauze fell out, replaced and re-taped dressing. C/o constipation, BS active, passing flatus, senna given at 0640, encouraging walks in the bernard. Up Ind. Tolerating mod cho diet, BG checks 171 and 166. Antibiotic switched to ancef. Plan for NPO at midnight for dressing change tomorrow with possible more extensive debriedment needed.     5940-1362: VSS. Medium BM. Voiding adequately. , tolerating diet. No further changes.   
Mayuri

## 2024-06-24 NOTE — PLAN OF CARE
Heart Failure Care Pathway  GOALS TO BE MET BEFORE DISCHARGE:    1. Decrease congestion and/or edema with diuretic therapy to achieve near      optimal volume status.            Dyspnea improved:  Yes            Edema improved:     Yes        Net I/O and Weights since admission:          07/23 2300 - 08/22 2259  In: 1667 [P.O.:1660; I.V.:7]  Out: 6400 [Urine:6400]  Net: -4733            Vitals:    08/19/19 1844 08/20/19 0206 08/21/19 0600 08/22/19 0648   Weight: 92.1 kg (203 lb) 91.2 kg (201 lb 1.6 oz) 89.3 kg (196 lb 12.8 oz) 89 kg (196 lb 3.2 oz)         2.  O2 sats > 92% on RA or at prior home O2 therapy level.          Current oxygenation status:       SpO2: 94 %         O2 Device: None (Room air),            Able to wean O2 this shift to keep sats > 92%:  Yes       Does patient use Home O2? No    3.  Tolerates ambulation and mobility near baseline: Yes        How many times did the patient ambulate with nursing staff this shift? 2    Please review the Heart Failure Care Pathway for additional HF goal outcomes.    Jennifer Fry RN RN  8/22/2019     Pt is A&O. Denies pain. Tele is SR. Reports mild SOB with activity. LS clear. Wound care to lower back completed per orders. 2+ edema present in R ankle. Pt is up independently in room. Ambulated in bernard x2. Will transition to PO Lasix and discharge home tomorrow.      Spoke to patient and confirmed she read [employer] results message. Patient stated she was waiting on new dose of Levothyroxine 175mcg to be sent to pharmacy. Confirmed script was not sent. New script sent. Patient had no further questions at this time.
